# Patient Record
Sex: FEMALE | Race: WHITE | NOT HISPANIC OR LATINO | Employment: FULL TIME | URBAN - METROPOLITAN AREA
[De-identification: names, ages, dates, MRNs, and addresses within clinical notes are randomized per-mention and may not be internally consistent; named-entity substitution may affect disease eponyms.]

---

## 2017-06-19 ENCOUNTER — OFFICE VISIT (OUTPATIENT)
Dept: PSYCHOLOGY | Facility: CLINIC | Age: 57
End: 2017-06-19
Attending: PSYCHOLOGIST
Payer: COMMERCIAL

## 2017-06-19 DIAGNOSIS — F43.20 ADJUSTMENT DISORDER, UNSPECIFIED TYPE: Primary | ICD-10-CM

## 2017-06-19 PROCEDURE — 40000114 ZZH STATISTIC NO CHARGE CLINIC VISIT

## 2017-06-19 PROCEDURE — 90791 PSYCH DIAGNOSTIC EVALUATION: CPT | Mod: ZP | Performed by: PSYCHOLOGIST

## 2017-06-19 NOTE — MR AVS SNAPSHOT
After Visit Summary   6/19/2017    Barb Vazquez    MRN: [id number]           Patient Information     Date Of Birth          1960        Visit Information        Provider Department      6/19/2017 3:00 PM Shahla Stewart PhD LP [facility]         Today's Diagnoses     Adjustment disorder, unspecified type    -  1       Follow-ups after your visit        Your next 10 appointments already scheduled     Jul 10, 2017  8:00 AM CDT   Return Visit with Shahla Stewart PhD LP   [facility]  ([facility])    [facility]  3rd Flr, [address]  [address]  [facility] [address]   [phone]            Jul 17, 2017  8:00 AM CDT   Return Visit with Shahla Stewart PhD LP   [facility]  ([facility])    [facility]  [address], [address]  [address]  [facility] [address]   [phone]            Jul 24, 2017  1:00 PM CDT   Return Visit with Shahla Stewart PhD LP   [facility]  ([facility])    [facility]  3rd Flr, [address]  [address]  [facility] [address]   [phone]            Aug 01, 2017  9:05 AM CDT   (Arrive by 8:50 AM)   PHYSICAL with DARIO Carroll [facility] ([facility])    [address]  4th Floor  [facility] [address] [phone]            Aug 16, 2017 11:15 AM CDT   MA SCREENING DIGITAL BILATERAL with URBCMA1   [facility] ([facility])    [address] [place] [address], [address]  [facility] [address] [phone]           Do not use any powder, lotion or deodorant under your arms or on your breast. If you do, we will ask you to remove it before your exam.  Wear comfortable, two-piece clothing.  If you have any allergies, tell your care team.  Bring any previous mammograms from other facilities or have them mailed to the breast  center.              Who to contact     Please call your clinic at [phone] to:    Ask questions about your health    Make or cancel appointments    Discuss your medicines    Learn about your test results    Speak to your doctor   If you have compliments or concerns about an experience at your clinic, or if you wish to file a complaint, please contact [facility] Patient Relations at [phone] or email us at [email or web address]         Additional Information About Your Visit        MyChart Information     Dejamort gives you secure access to your electronic health record. If you see a primary care provider, you can also send messages to your care team and make appointments. If you have questions, please call your primary care clinic.  If you do not have a primary care provider, please call [phone] and they will assist you.      MedAlliance is an electronic gateway that provides easy, online access to your medical records. With MedAlliance, you can request a clinic appointment, read your test results, renew a prescription or communicate with your care team.     To access your existing account, please contact your [facility] or call [phone] for assistance.        Care EveryWhere ID     This is your Care EveryWhere ID. This could be used by other organizations to access your [place] medical records  [id number]         Blood Pressure from Last 3 Encounters:   08/04/16 110/71   07/28/15 102/65   08/07/14 93/61    Weight from Last 3 Encounters:   08/04/16 68.7 kg (151 lb 6.4 oz)   07/28/15 71.5 kg (157 lb 9.6 oz)   08/07/14 70.6 kg (155 lb 9.6 oz)              Today, you had the following     No orders found for display       Primary Care Provider Office Phone # Fax #    DARIO Carroll [phone]       PRIMARY CARE CENTER [address] [place] [address] [phone]  [facility] [address]        Thank you!     Thank you for choosing  [facility]   for your care. Our goal is always to provide you with excellent care. Hearing back from our patients is one way we can continue to improve our services. Please take a few minutes to complete the written survey that you may receive in the mail after your visit with us. Thank you!             Your Updated Medication List - Protect others around you: Learn how to safely use, store and throw away your medicines at www.disposemymeds.org.          This list is accurate as of: 6/19/17 11:59 PM.  Always use your most recent med list.                   Brand Name Dispense Instructions for use    SUMAtriptan 100 MG tablet    IMITREX    18 tablet    Take  1/2 - 1 tab by mouth at the onset of headache. May repeat in 2 hours if needed.  Max 2/day       vitamin D 87175 UNIT capsule    ERGOCALCIFEROL    16 capsule    Take one tablet twice a week for 8 weeks.  Then have lab re-checked.

## 2017-06-19 NOTE — PROGRESS NOTES
"INITIAL PSYCHOLOGY INTERVIEW AND TREATMENT PLAN (seen for 50 min)  Referred by: Self  Identifying information. Ms Snider is a 57yo woman and mother of three adult children.  She was recently  after 35 years of marriage (March 2017).  She is employed full time as a school nurse (\"love my job\").  Her ex- is employed in IT in a large health organization.  The only prior counseling she has had was part of family counseling at the time her youngest daughter was hospitalized.  Information is estimated to be valid and reliable but incomplete.  Presenting problem.  \"\"Our marriage of 35 yrs. . (divorce) just finalized. . the end of March\"  History of presenting problem.   KS#1.  Major life change/transition.  Ms Vazquez described her  had threatened divorce two times before the third in 2016.  She described he told her the marriage \"couldn't be sustained\" about a year ago.  She describes herself as \"very angry. . never want to see him again. . the 3rd time\".  He evidently had made a similar request about 5 years ago ([id number]).  At that time her response was that their daughter was in [place] . . not a good  time. . and we tried on our own. . And the kids were going off to college\".  Subsequently in 2015, he told her \"this is not working. . let's get . . (then) oh well I think we can work it out\".  She wanted to maintain a family (an intact marriage) for the kids and she \"was afraid financially\".  Finally,  In August 2016, he again brought up divorce and she agreed.  She describes the issues between them as \"he's very self absorbed. . A poor communicator. . He was isolating himself. . (said) my issues are because of you, you don't make me happy, Debby's always bad\".  She describes herself as being \"generally happy\" and that she \"tried to make him happy\".  She thought they both had a satisfactory/satisfying life:  \"good jobs, three healthy kids, enough money, we were healthy\".  The couple " "participated in mediation, eventually hired  (\"he hired a \"), she did not initially tell anyone (\"told the kids\"/Sept 2016, and finally she moved out.  He has remained in their house, which is what she would have preferred for herself.  She considered seeking additional spousal support but signed the divorce papers to avoid another expensive process.  She says her family also is angry at him and her youngest daughter thinks \"eventually you will be better off\".   Evidently initially a reluctant divorce from her perspective and divorce process/results unpleasant.  She reports the couple did not participate in couples' counseling because they had had a prior experience with family counseling in which she felt non-supported by the therapist.  IMPRESSION. Residual anger directed at ex-.   ID#2.  Additional stresses.  Hx incomplete.  Additional family and personal history.  Two of her children live out-of-state.  One daughter lives in [place].  Health.  \"excellent\".  Does not smoke tobacco, minimal alcohol use and exercises \"a lot\".  Mental status.  Ms Vazquez is a neatly groomed pleasant woman who is aware she is very angry at her ex-.  She was occasionally tearful.  Her focus was on her ex-, his behavior, and her residual anger towards him.  Orientation, thought processes, memory, attention, concentration, and eye contact were satisfactory.  The overall impression is of a recently  woman who had been \"generally happy\" while  and continues to be angry at her  for his quietly persistent and eventually successful choice for divorce.  Diagnosis    Axis I.  Adjustment rx  Axis II.  Deferred  Axis III.   Axis IV. Psychosocial stress:  HIGH  Axis V.  GAF past yr:  85   GAF current:  85  PLAN.  RT 2 wks - established 3 initial appts.  "

## 2017-07-10 ENCOUNTER — OFFICE VISIT (OUTPATIENT)
Dept: PSYCHOLOGY | Facility: CLINIC | Age: 57
End: 2017-07-10
Attending: OBSTETRICS & GYNECOLOGY
Payer: COMMERCIAL

## 2017-07-10 DIAGNOSIS — F43.20 ADJUSTMENT DISORDER, UNSPECIFIED TYPE: Primary | ICD-10-CM

## 2017-07-10 PROCEDURE — 90834 PSYTX W PT 45 MINUTES: CPT | Mod: ZP | Performed by: PSYCHOLOGIST

## 2017-07-10 NOTE — MR AVS SNAPSHOT
After Visit Summary   7/10/2017    Barb Vazquez    MRN: [id number]           Patient Information     Date Of Birth          1960        Visit Information        Provider Department      7/10/2017 8:00 AM Shahla Stewart, PhD LOIS [facility]         Today's Diagnoses     Adjustment disorder, unspecified type    -  1       Follow-ups after your visit        Your next 10 appointments already scheduled     Jul 17, 2017  8:00 AM CDT   Return Visit with Shahla Stewart PhD LP   [facility]  ([facility])    [facility]  [address]rd [address], [address]  [address] [facility] [address] [phone]            Jul 24, 2017  1:00 PM CDT   Return Visit with Shahla Stewart PhD LP   [facility]  ([facility])    [facility]  [address]rd [address], [address]  [address] [place] [id number] [phone]            Aug 01, 2017  9:05 AM CDT   (Arrive by 8:50 AM)   PHYSICAL with DARIO Carroll [facility] ([facility])    [address]  4th Floor  [facility] [address] [phone]            Aug 16, 2017 11:15 AM CDT   MA SCREENING DIGITAL BILATERAL with URBCMA1   [facility] ([facility])    [address] [place] [address], [address]  [facility] [address] [phone]           Do not use any powder, lotion or deodorant under your arms or on your breast. If you do, we will ask you to remove it before your exam.  Wear comfortable, two-piece clothing.  If you have any allergies, tell your care team.  Bring any previous mammograms from other facilities or have them mailed to the breast center.              Who to contact     Please call your clinic at [phone] to:    Ask questions about your health    Make or cancel appointments    Discuss your medicines    Learn about your test results    Speak to your doctor   If you have  compliments or concerns about an experience at your clinic, or if you wish to file a complaint, please contact [facility] Patient Relations at [phone] or email us at [email or web address]         Additional Information About Your Visit        MyChart Information     Acrolinxhart gives you secure access to your electronic health record. If you see a primary care provider, you can also send messages to your care team and make appointments. If you have questions, please call your primary care clinic.  If you do not have a primary care provider, please call [phone] and they will assist you.      [email or web address] is an electronic gateway that provides easy, online access to your medical records. With [email or web address], you can request a clinic appointment, read your test results, renew a prescription or communicate with your care team.     To access your existing account, please contact your [facility] or call [phone] for assistance.        Care EveryWhere ID     This is your Care EveryWhere ID. This could be used by other organizations to access your [place] medical records  [id number]         Blood Pressure from Last 3 Encounters:   08/04/16 110/71   07/28/15 102/65   08/07/14 93/61    Weight from Last 3 Encounters:   08/04/16 68.7 kg (151 lb 6.4 oz)   07/28/15 71.5 kg (157 lb 9.6 oz)   08/07/14 70.6 kg (155 lb 9.6 oz)              Today, you had the following     No orders found for display       Primary Care Provider Office Phone # Fax #    Daly JOSE Euceda, DARIO [phone]       PRIMARY CARE CENTER [address] [place] [address] 741  [facility] [address]        Equal Access to Services     FABI SINGLETON : Hadii danelle Chavez, wacaitieda luqadaha, qajannetteta kaalmada adesjyanatali, heidi sadler. So [facility] [phone].    ATENCIÓN: Si habla español, tiene a davis disposición servicios gratuitos de asistencia lingüística. Llame  al [phone].    We comply with applicable federal civil rights laws and [place] laws. We do not discriminate on the basis of race, color, national origin, age, disability sex, sexual orientation or gender identity.            Thank you!     Thank you for choosing [facility]   for your care. Our goal is always to provide you with excellent care. Hearing back from our patients is one way we can continue to improve our services. Please take a few minutes to complete the written survey that you may receive in the mail after your visit with us. Thank you!             Your Updated Medication List - Protect others around you: Learn how to safely use, store and throw away your medicines at www.disposemymeds.org.          This list is accurate as of: 7/10/17 12:51 PM.  Always use your most recent med list.                   Brand Name Dispense Instructions for use Diagnosis    SUMAtriptan 100 MG tablet    IMITREX    18 tablet    Take  1/2 - 1 tab by mouth at the onset of headache. May repeat in 2 hours if needed.  Max 2/day    Frequent headaches       vitamin D 27894 UNIT capsule    ERGOCALCIFEROL    16 capsule    Take one tablet twice a week for 8 weeks.  Then have lab re-checked.    Unspecified vitamin D deficiency
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
## 2017-07-13 ENCOUNTER — TRANSFERRED RECORDS (OUTPATIENT)
Dept: HEALTH INFORMATION MANAGEMENT | Facility: CLINIC | Age: 57
End: 2017-07-13

## 2017-07-17 ENCOUNTER — OFFICE VISIT (OUTPATIENT)
Dept: PSYCHOLOGY | Facility: CLINIC | Age: 57
End: 2017-07-17
Attending: OBSTETRICS & GYNECOLOGY
Payer: COMMERCIAL

## 2017-07-17 DIAGNOSIS — F43.20 ADJUSTMENT DISORDER, UNSPECIFIED TYPE: Primary | ICD-10-CM

## 2017-07-17 PROCEDURE — 90834 PSYTX W PT 45 MINUTES: CPT | Mod: ZP | Performed by: PSYCHOLOGIST

## 2017-07-17 NOTE — PROGRESS NOTES
"Psychology Progress Note  Counseling (#3).  INDIV TH 50 min.  INTERV pr solving, support.  DX: F43.23  UT Mood variability:  anger  S \"became a grandma on Saturday. . (notified) by email. . Wondered it he would be there\" \"going to [place] for Thanksgiving, he never responded to the email\" \"son coming in August with his gf, will stay with is Al (father)\"  \"he gives them things. . A car, condo. . I can't do that\"  (?issues) \"relationships with the children, living situation (he in beautiful house, I'm in 2-bedroom duplex), things at the house I would like, criticism\" (?) \"having kids changed everything- - he changed, complaining, criticism, blaming. . Worse and worse until 12 yrs ago I decided it wasn't going to change, friends told me to lower my expectations. . (I decided) to do what makes me happy. . Didn't know if I'd get (financial) support\" \"walking on egg shells (around children)\" \"He expected things would be like before. . \"  O Perceiving relationships with children are changing.  A Aware divorce has changed family relationships, including between her and her 3 children and probably between the siblings.  Discussed that relationships between she and her adult children might change anyway but add-in the divorce and it is relatively easy to assume it is the 'divorce' that is changing the dynamics.  The children are behaving in ways that she did: ie, not confronting father's behavior, not wanting to rock the boat, accepting his gifts. Post-divorce she is at a relative financial and housing disadvantage, ie has given up specific advantages that ex- continues to enjoy.  She also is aware that while  she 'gave up' qualities of who she was - e.g., cooking.  Speculation:  Ex- was self-absorbed?  Discussed how direct can she be with her children.  GOAL   Possibly design who she wants to be, ie, are there qualities she has given up that she would like to re-develop? Or Develop other " qualities?  Plan.  RT 1-2 wks.  TP  6.19.2017

## 2017-07-17 NOTE — MR AVS SNAPSHOT
After Visit Summary   7/17/2017    Barb Vazquez    MRN: [id number]           Patient Information     Date Of Birth          1960        Visit Information        Provider Department      7/17/2017 8:00 AM Shahla Stewart, PhD LOIS [facility]         Today's Diagnoses     Adjustment disorder, unspecified type    -  1       Follow-ups after your visit        Your next 10 appointments already scheduled     Jul 24, 2017  1:00 PM CDT   Return Visit with Shahla Stewart, PhD LOIS   [facility]  ([facility])    [facility]  3rd Flr, [facility] [address]  [address] [facility] [address] [phone]            Aug 01, 2017  9:05 AM CDT   (Arrive by 8:50 AM)   PHYSICAL with DARIO Carroll [facility] ([facility])    [address]  4th Floor  [facility] [address] [phone]            Aug 16, 2017 11:15 AM CDT   MA SCREENING DIGITAL BILATERAL with URBCMA1   [facility] ([facility])    [address] [place] [address], [address]  [facility] [address] [phone]           Do not use any powder, lotion or deodorant under your arms or on your breast. If you do, we will ask you to remove it before your exam.  Wear comfortable, two-piece clothing.  If you have any allergies, tell your care team.  Bring any previous mammograms from other facilities or have them mailed to the breast center.              Who to contact     Please call your clinic at [phone] to:    Ask questions about your health    Make or cancel appointments    Discuss your medicines    Learn about your test results    Speak to your doctor   If you have compliments or concerns about an experience at your clinic, or if you wish to file a complaint, please contact [facility] Patient Relations at [phone] or email us at [email or web address]          Additional Information About Your Visit        Mycell Technologieshart Information     Aphios gives you secure access to your electronic health record. If you see a primary care provider, you can also send messages to your care team and make appointments. If you have questions, please call your primary care clinic.  If you do not have a primary care provider, please call [phone] and they will assist you.      Aphios is an electronic gateway that provides easy, online access to your medical records. With Aphios, you can request a clinic appointment, read your test results, renew a prescription or communicate with your care team.     To access your existing account, please contact your [facility] or call [phone] for assistance.        Care EveryWhere ID     This is your Care EveryWhere ID. This could be used by other organizations to access your [place] medical records  [id number]         Blood Pressure from Last 3 Encounters:   08/04/16 110/71   07/28/15 102/65   08/07/14 93/61    Weight from Last 3 Encounters:   08/04/16 68.7 kg (151 lb 6.4 oz)   07/28/15 71.5 kg (157 lb 9.6 oz)   08/07/14 70.6 kg (155 lb 9.6 oz)              Today, you had the following     No orders found for display       Primary Care Provider Office Phone # Fax #    Daly GARCIA DARIO Euceda [phone]       PRIMARY CARE CENTER [address] [place] [address] 741  [facility] [address]        Equal Access to Services     FABI SINGLETON : Hadii aad ku hadasho Soomaali, waaxda luqadaha, qaybta kaalmada adeegyada, heidi campoverde . So [facility] [phone].    ATENCIÓN: Si habla español, tiene a davis disposición servicios gratuitos de asistencia lingüística. Llame al [phone].    We comply with applicable federal civil rights laws and [place] laws. We do not discriminate on the basis of race, color, national origin, age, disability sex, sexual orientation or gender identity.            Thank  you!     Thank you for choosing [facility]   for your care. Our goal is always to provide you with excellent care. Hearing back from our patients is one way we can continue to improve our services. Please take a few minutes to complete the written survey that you may receive in the mail after your visit with us. Thank you!             Your Updated Medication List - Protect others around you: Learn how to safely use, store and throw away your medicines at www.disposemymeds.org.          This list is accurate as of: 7/17/17  1:34 PM.  Always use your most recent med list.                   Brand Name Dispense Instructions for use Diagnosis    SUMAtriptan 100 MG tablet    IMITREX    18 tablet    Take  1/2 - 1 tab by mouth at the onset of headache. May repeat in 2 hours if needed.  Max 2/day    Frequent headaches       vitamin D 11642 UNIT capsule    ERGOCALCIFEROL    16 capsule    Take one tablet twice a week for 8 weeks.  Then have lab re-checked.    Unspecified vitamin D deficiency

## 2017-07-18 ASSESSMENT — ACTIVITIES OF DAILY LIVING (ADL)
DO_MEMBERS_OF_YOUR_HOUSEHOLD_USE_SAFETY_HELMETS?: Y
ARE_THERE_CARBON_MONOXIDE_DETECTORS_IN_YOUR_HOME?: Y
ARE_THERE_SMOKE_DETECTORS_IN_YOUR_HOME?: Y
DO_MEMBERS_OF_YOUR_HOUSEHOLD_USE_SUNSCREEN?: Y
DO_MEMBERS_OF_YOUR_HOUSEHOLD_WEAR_SEAT_BELTS?: Y
ARE_THERE_FIREARMS_IN_YOUR_HOME?: N

## 2017-07-24 ENCOUNTER — OFFICE VISIT (OUTPATIENT)
Dept: PSYCHOLOGY | Facility: CLINIC | Age: 57
End: 2017-07-24
Attending: OBSTETRICS & GYNECOLOGY
Payer: COMMERCIAL

## 2017-07-24 DIAGNOSIS — F43.20 ADJUSTMENT DISORDER, UNSPECIFIED TYPE: Primary | ICD-10-CM

## 2017-07-24 PROCEDURE — 90834 PSYTX W PT 45 MINUTES: CPT | Mod: ZP | Performed by: PSYCHOLOGIST

## 2017-07-24 NOTE — PROGRESS NOTES
"Psychology Progress Note  Counseling (#4).  INDIV TH 50 min.  INTERV pr solving, support.  DX: F43.23  MS Stress  S \"I'm a fatalist, then go into a tailspin. . Found out thru e-mail about the baby. . Didn't hear from her (daughter about seeing baby). . Ready to call and ask what I had done (wrong). . Then she called. . Everything was fine\" \"will see parents in [place]\" \"will continue to look (for house). . Paying $1450/mon. . My sister came up from [place] . . Loved where I live\" \"did my will, transferred checking\"  O Identifies making progress on practical issues.  Housing remains uncertain for now.  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  Many uncertainties remain.  Discussed 'awfulizing\" and role in downward spiraling.  GOAL Challenge awfulizing tendency.  Plan.  Encouraged to make additional follow up appts at her chosen level of frequency.  May introduce some CBT concepts to challenge awfulizing.  Schedule 2 appts in August.  TP  6.19.2017    "

## 2017-07-24 NOTE — MR AVS SNAPSHOT
After Visit Summary   7/24/2017    Barb Vazquez    MRN: [id number]           Patient Information     Date Of Birth          1960        Visit Information        Provider Department      7/24/2017 1:00 PM Shahla Stewart, PhD LP [facility]         Today's Diagnoses     Adjustment disorder, unspecified type    -  1       Follow-ups after your visit        Your next 10 appointments already scheduled     Aug 01, 2017  9:05 AM CDT   (Arrive by 8:50 AM)   PHYSICAL with DARIO Carroll [facility] ([facility])    [address]  4th Floor  [facility] [address] [phone]            Aug 16, 2017 11:15 AM CDT   MA SCREENING DIGITAL BILATERAL with URBCMA1   [facility] ([facility])    [address] [place] [address], [address]  [facility] [address] [phone]           Do not use any powder, lotion or deodorant under your arms or on your breast. If you do, we will ask you to remove it before your exam.  Wear comfortable, two-piece clothing.  If you have any allergies, tell your care team.  Bring any previous mammograms from other facilities or have them mailed to the breast center.              Who to contact     Please call your clinic at [phone] to:    Ask questions about your health    Make or cancel appointments    Discuss your medicines    Learn about your test results    Speak to your doctor   If you have compliments or concerns about an experience at your clinic, or if you wish to file a complaint, please contact [facility] Patient Relations at [phone] or email us at [email or web address]         Additional Information About Your Visit        MyChart Information     [employer] gives you secure access to your electronic health record. If you see a primary care provider, you can also send messages to your care team and make appointments. If you  have questions, please call your primary care clinic.  If you do not have a primary care provider, please call [phone] and they will assist you.      [employer] is an electronic gateway that provides easy, online access to your medical records. With [employer], you can request a clinic appointment, read your test results, renew a prescription or communicate with your care team.     To access your existing account, please contact your [facility] or call [phone] for assistance.        Care EveryWhere ID     This is your Care EveryWhere ID. This could be used by other organizations to access your [place] medical records  [id number]         Blood Pressure from Last 3 Encounters:   08/04/16 110/71   07/28/15 102/65   08/07/14 93/61    Weight from Last 3 Encounters:   08/04/16 68.7 kg (151 lb 6.4 oz)   07/28/15 71.5 kg (157 lb 9.6 oz)   08/07/14 70.6 kg (155 lb 9.6 oz)              Today, you had the following     No orders found for display       Primary Care Provider Office Phone # Fax #    DARIO Carroll [phone]       PRIMARY CARE CENTER [address] [facility] [address]  [facility] [address]        Equal Access to Services     FABI SINGLETON : Hadii aad ku hadasho Soomaali, waaxda luqadaha, qaybta kaalmada adeegyada, waxay banin karl sadler. So [facility] [phone].    ATENCIÓN: Si habla español, tiene a davis disposición servicios gratuitos de asistencia lingüística. Llame al [phone].    We comply with applicable federal civil rights laws and [place] laws. We do not discriminate on the basis of race, color, national origin, age, disability sex, sexual orientation or gender identity.            Thank you!     Thank you for choosing [facility]   for your care. Our goal is always to provide you with excellent care. Hearing back from our patients is one way we can continue to improve our services. Please take a few minutes  to complete the written survey that you may receive in the mail after your visit with us. Thank you!             Your Updated Medication List - Protect others around you: Learn how to safely use, store and throw away your medicines at www.disposemymeds.org.          This list is accurate as of: 7/24/17 11:59 PM.  Always use your most recent med list.                   Brand Name Dispense Instructions for use Diagnosis    SUMAtriptan 100 MG tablet    IMITREX    18 tablet    Take  1/2 - 1 tab by mouth at the onset of headache. May repeat in 2 hours if needed.  Max 2/day    Frequent headaches       vitamin D 18694 UNIT capsule    ERGOCALCIFEROL    16 capsule    Take one tablet twice a week for 8 weeks.  Then have lab re-checked.    Unspecified vitamin D deficiency

## 2017-08-01 ENCOUNTER — OFFICE VISIT (OUTPATIENT)
Dept: INTERNAL MEDICINE | Facility: CLINIC | Age: 57
End: 2017-08-01

## 2017-08-01 VITALS
HEIGHT: 64 IN | RESPIRATION RATE: 16 BRPM | DIASTOLIC BLOOD PRESSURE: 63 MMHG | BODY MASS INDEX: 26.46 KG/M2 | WEIGHT: 155 LBS | HEART RATE: 54 BPM | SYSTOLIC BLOOD PRESSURE: 102 MMHG

## 2017-08-01 DIAGNOSIS — Z12.4 SCREENING FOR CERVICAL CANCER: ICD-10-CM

## 2017-08-01 DIAGNOSIS — Z00.00 ROUTINE GENERAL MEDICAL EXAMINATION AT A HEALTH CARE FACILITY: ICD-10-CM

## 2017-08-01 DIAGNOSIS — J98.01 ACUTE BRONCHOSPASM: Primary | ICD-10-CM

## 2017-08-01 DIAGNOSIS — J98.01 ACUTE BRONCHOSPASM: ICD-10-CM

## 2017-08-01 PROBLEM — D12.6 COLON ADENOMA: Status: ACTIVE | Noted: 2017-07-13

## 2017-08-01 LAB
ANION GAP SERPL CALCULATED.3IONS-SCNC: 9 MMOL/L (ref 3–14)
BUN SERPL-MCNC: 18 MG/DL (ref 7–30)
CALCIUM SERPL-MCNC: 8.8 MG/DL (ref 8.5–10.1)
CHLORIDE SERPL-SCNC: 105 MMOL/L (ref 94–109)
CO2 SERPL-SCNC: 26 MMOL/L (ref 20–32)
CREAT SERPL-MCNC: 1.08 MG/DL (ref 0.52–1.04)
GFR SERPL CREATININE-BSD FRML MDRD: 52 ML/MIN/1.7M2
GLUCOSE SERPL-MCNC: 87 MG/DL (ref 70–99)
POTASSIUM SERPL-SCNC: 3.6 MMOL/L (ref 3.4–5.3)
SODIUM SERPL-SCNC: 140 MMOL/L (ref 133–144)
TSH SERPL DL<=0.05 MIU/L-ACNC: 1.43 MU/L (ref 0.4–4)

## 2017-08-01 RX ORDER — ALBUTEROL SULFATE 90 UG/1
2 AEROSOL, METERED RESPIRATORY (INHALATION) EVERY 6 HOURS
Qty: 1 INHALER | Refills: 1 | Status: SHIPPED | OUTPATIENT
Start: 2017-08-01 | End: 2021-04-08

## 2017-08-01 ASSESSMENT — PAIN SCALES - GENERAL: PAINLEVEL: NO PAIN (0)

## 2017-08-01 NOTE — NURSING NOTE
Chief Complaint   Patient presents with     Physical     Pt is here for a physical.      Lizeth Mccann LPN August 1, 2017 8:50 AM

## 2017-08-01 NOTE — PROGRESS NOTES
Barb Vazquez is a 56 year old female who presents to clinic today for her annual physical.  She feels well .  No new problems. She is planning to participate in a tri-athelon in a few weeks.  She does drink milk and eats yogurt daily. She remembers to take vitamin D supplement at least 3 times a week.   She has a mammo scheduled later this August.    She is newly .  Her  has been mentioning he wanted a divorce for a few years so  Last summer she agreed.  She moved out of her house in January and is living in a duplex and her ex- was suppose to sell the house but has stayed in it.        She has a new and first grandchild, a boy born 2 weeks ago. They live in town.   Her oldest daughter finished her first year of medical school at [place].  Her son lives in [place]. Kids are doing well.     She has the following medical issues :   Patient Active Problem List   Diagnosis     CARDIOVASCULAR SCREENING; LDL GOAL LESS THAN 160     Blepharospasm     Keloid of skin     Osteopenia     Vitamin D deficiency       MEDICATIONS:  Current Outpatient Prescriptions   Medication     SUMAtriptan (IMITREX) 100 MG tablet     vitamin D (ERGOCALCIFEROL) 07578 UNIT capsule       ALLERGIES: Asa [aspirin]    PAST MEDICAL HX: No past medical history on file.    PAST SURGICAL HX: No past surgical history on file.    IMMUNIZATION HX:   Immunization History   Administered Date(s) Administered     Hepatitis A Vac Ped/Adol-2 Dose 04/03/2009, 08/16/2011     TD (ADULT, 7+) 08/03/1994     TDAP Vaccine (Boostrix) 08/16/2011     Yellow Fever 04/03/2009     Family History   Problem Relation Age of Onset     Glaucoma Father      Lipids Other      Neurologic Disorder Mother      pituitary tumor     CANCER Mother      SOCIAL HX:   History     Social History     Marital Status:      Spouse Name: N/A     Number of Children: 3     Years of Education: N/A     Occupational History     Rn      Social History Main Topics      "Smoking status: Never Smoker      Smokeless tobacco: Never Used     Alcohol Use: None     Drug Use: None     Sexually Active: yes     Other Topics Concern     None     Social History Narrative     None     ROS:   CONSTITUTIONAL:no unexpected change in weight  SKIN:no new findings     EYES: no acute vision problems or changes.  Wears contact lenses. Exams UTD.    ENT: No mouth problems, no throat problems.  Dental UTD.    RESP: no significant cough, no shortness of breath.  She does have bronchospasms with URIs.  Requesting an RX for Albuterol.  CV: no chest pain, no palpitations, no new or worsening peripheral edema  GI: no nausea, no vomiting, no constipation, no diarrhea. She had a colonoscopy 2017 and had an advanced adenoma 9x11 mm removed. Was advised to repeat colonoscopy in 3 years.   : Menopausal since .  .  Last pap 2013.   BREASTS: last Mammo was 8/15/2016.  END: nl.  She was diagnosed with osteopenia by Dexa in  and was treated for low vitamin D level and continues to take daily vitamin D supplement.Dexa 2016:  The most negative and valid Z-score of -1.1 at the level of the right femoral neck, is WITHIN  expected range for age.  MS: no new complaints.  NEURO:The  Sumatriptin works well for her migraine headaches and she has not had these often.   PSYCH: Nl. She feels she is doing well after the divorce and mostly is angry with her ex- and feels he is totally self-absorbed. She believes she will be fine. She is seeing a counselor.       O:  /63  Pulse 54  Resp 16  Ht 1.632 m (5' 4.25\")  Wt 70.3 kg (155 lb)  BMI 26.4 kg/m2Body mass index is 26.4 kg/(m^2).  GENERAL APPEARANCE: healthy, alert and no distress  EYES: contacts. PERRLA, conjunctiva clear, sclera white.   HENT: ear canals and TM's normal and nose and mouth without ulcers or lesions.  Teeth in good repair  SKIN: She has a keloid R para-sternal  area.   RESP: lungs clear to auscultation - no rales, rhonchi " or wheezes.  Neck: neg for thyroid enlargement.  CV: regular rates and rhythm, normal S1 S2, no S3 or S4 and no murmur, click or rub -  BREASTS: no masses, no dimpling.  ABDOMEN:  soft, non tender, no HSM or masses and bowel sounds normal  Pelvic Exam:  Vulva: No external lesions, normal hair distribution, no adenopathy  Vagina: Moist, pink, no abnormal discharge, well rugated, no lesions  Cervix: parous, mild erosion, pink, no visible lesions  Uterus: Normal size, anteverted, non-tender, mobile  Ovaries: No mass, non-tender, mobile  LYMPH: no neck, supra or infra-clavicular or axillary adenopathy.  Neuro: nl   Ext: warm.   Edema : None  MS: grossly normal      Answers for HPI/ROS submitted by the patient on 7/18/2017   Annual Exam:  General Symptoms: No  Skin Symptoms: No  HENT Symptoms: No  EYE SYMPTOMS: No  HEART SYMPTOMS: No  LUNG SYMPTOMS: No  INTESTINAL SYMPTOMS: No  URINARY SYMPTOMS: No  GYNECOLOGIC SYMPTOMS: No  BREAST SYMPTOMS: No  SKELETAL SYMPTOMS: No  BLOOD SYMPTOMS: No  NERVOUS SYSTEM SYMPTOMS: No  MENTAL HEALTH SYMPTOMS: No  Frequency of exercise:: 4-5 days/week  Duration of exercise:: 45-60 minutes    Barb was seen today for physical.    Diagnoses and all orders for this visit:    Acute bronchospasm  -     albuterol (PROAIR HFA/PROVENTIL HFA/VENTOLIN HFA) 108 (90 BASE) MCG/ACT Inhaler; Inhale 2 puffs into the lungs every 6 hours to use when experiencing bronchospasms.     Routine general medical examination at a health care facility  -     Pelvic and Breast Exam Procedure (exam)        -     TSH        -     Basic metabolic panel    Screening for cervical cancer  -     Pap imaged thin layer screen with HPV - recommended age 30 - 65 years (select HPV order below).    Adjustment Rx          -     She will continue to see Yo Stewart as needed.     Colon Adenoma           -     Colonoscopy 3 years. Will obtain outside records and have her sign CALLY.    RTC 1 year.     Total time spent 40 minutes.  More  than 50% of the time spent with Ms. Vazquez on counseling / coordinating her care

## 2017-08-01 NOTE — MR AVS SNAPSHOT
After Visit Summary   8/1/2017    Barb Vazquez    MRN: [id number]           Patient Information     Date Of Birth          1960        Visit Information        Provider Department      8/1/2017 9:05 AM Daly Euceda, APRN CNP [facility]        Today's Diagnoses     Acute bronchospasm    -  1    Routine general medical examination at a health care facility        Screening for cervical cancer          Care Instructions    Primary Care Center Medication Refill Request Information:  * Please contact your pharmacy regarding ANY request for medication refills.  ** [facility] Prescription Fax = [phone]  * Please allow 3 business days for routine medication refills.  * Please allow 5 business days for controlled substance medication refills.     Primary Care Center Test Result notification information:  *You will be notified within 7-10 days of your appointment day regarding the results of your test.  If you are on MyChart you will be notified as soon as the provider has reviewed the results and signed off on them.            Follow-ups after your visit        Your next 10 appointments already scheduled     Aug 07, 2017  8:00 AM CDT   Return Visit with Shahla Stewart, PhD    [facility]  ([facility])    [address], [facility] [address]  [address] [place] [id number] [phone]            Aug 16, 2017 11:15 AM CDT   MA SCREENING DIGITAL BILATERAL with URBCMA1   [facility] ([facility])    [address] [place] [address], [address]  [facility] [id number] [phone]           Do not use any powder, lotion or deodorant under your arms or on your breast. If you do, we will ask you to remove it before your exam.  Wear comfortable, two-piece clothing.  If you have any allergies, tell your care team.  Bring any previous mammograms from other facilities or have them mailed to the breast center.            Sep  "18, 2017  8:00 AM CDT   Return Visit with Shahla Stewart, PhD LP   [facility]  ([facility])    [facility]  3rd [facility], [facility] [address]  [address] [address] [phone]              Who to contact     Please call your clinic at [phone] to:    Ask questions about your health    Make or cancel appointments    Discuss your medicines    Learn about your test results    Speak to your doctor   If you have compliments or concerns about an experience at your clinic, or if you wish to file a complaint, please contact [facility] Patient Relations at [phone] or email us at [email or web address]         Additional Information About Your Visit        Lockbox[employer]Fantrotter Information     [employer] gives you secure access to your electronic health record. If you see a primary care provider, you can also send messages to your care team and make appointments. If you have questions, please call your primary care clinic.  If you do not have a primary care provider, please call [phone] and they will assist you.      [employer] is an electronic gateway that provides easy, online access to your medical records. With [employer], you can request a clinic appointment, read your test results, renew a prescription or communicate with your care team.     To access your existing account, please contact your [facility] or call [phone] for assistance.        Care EveryWhere ID     This is your Care EveryWhere ID. This could be used by other organizations to access your [place] medical records  [id number]        Your Vitals Were     Pulse Respirations Height BMI (Body Mass Index)          54 16 1.632 m (5' 4.25\") 26.4 kg/m2         Blood Pressure from Last 3 Encounters:   08/01/17 102/63   08/04/16 110/71   07/28/15 102/65    Weight from Last 3 Encounters:   08/01/17 70.3 kg (155 lb)   08/04/16 68.7 kg (151 lb 6.4 oz) "   07/28/15 71.5 kg (157 lb 9.6 oz)              We Performed the Following     Basic metabolic panel     Pap imaged thin layer screen with HPV - recommended age 30 - 65 years (select HPV order below)     Pelvic and Breast Exam Procedure (exam)     TSH          Today's Medication Changes          These changes are accurate as of: 8/1/17 10:11 AM.  If you have any questions, ask your nurse or doctor.               Start taking these medicines.        Dose/Directions    albuterol 108 (90 BASE) MCG/ACT Inhaler   Commonly known as:  PROAIR HFA/PROVENTIL HFA/VENTOLIN HFA   Used for:  Acute bronchospasm   Started by:  Daly Euceda, DARIO CNP        Dose:  2 puff   Inhale 2 puffs into the lungs every 6 hours   Quantity:  1 Inhaler   Refills:  1            Where to get your medicines      These medications were sent to [facility] [address]  [address], [facility] [address]     Phone:  [phone]     albuterol 108 (90 BASE) MCG/ACT Inhaler                Primary Care Provider Office Phone # Fax #    DARIO Carroll [phone]       PRIMARY CARE CENTER [address] [place] [address] [address]  [facility] [address]        Equal Access to Services     FABI SINGLETON AH: Hadii danelle mattson hadasho Soomaali, waaxda luqadaha, qaybta kaalmada adeegyada, heidi sadler. So [facility] [phone].    ATENCIÓN: Si habla español, tiene a davis disposición servicios gratuitos de asistencia lingüística. Llame al [phone].    We comply with applicable federal civil rights laws and [place] laws. We do not discriminate on the basis of race, color, national origin, age, disability sex, sexual orientation or gender identity.            Thank you!     Thank you for choosing [facility]  for your care. Our goal is always to provide you with excellent care. Hearing back from our patients is one way we can continue to improve our services. Please take a  few minutes to complete the written survey that you may receive in the mail after your visit with us. Thank you!             Your Updated Medication List - Protect others around you: Learn how to safely use, store and throw away your medicines at www.disposemymeds.org.          This list is accurate as of: 8/1/17 10:11 AM.  Always use your most recent med list.                   Brand Name Dispense Instructions for use Diagnosis    albuterol 108 (90 BASE) MCG/ACT Inhaler    PROAIR HFA/PROVENTIL HFA/VENTOLIN HFA    1 Inhaler    Inhale 2 puffs into the lungs every 6 hours    Acute bronchospasm       SUMAtriptan 100 MG tablet    IMITREX    18 tablet    Take  1/2 - 1 tab by mouth at the onset of headache. May repeat in 2 hours if needed.  Max 2/day    Frequent headaches       vitamin D 07949 UNIT capsule    ERGOCALCIFEROL    16 capsule    Take one tablet twice a week for 8 weeks.  Then have lab re-checked.    Unspecified vitamin D deficiency

## 2017-08-01 NOTE — PATIENT INSTRUCTIONS
Primary Care Center Medication Refill Request Information:  * Please contact your pharmacy regarding ANY request for medication refills.  ** [facility] Prescription Fax = [phone]  * Please allow 3 business days for routine medication refills.  * Please allow 5 business days for controlled substance medication refills.     Primary Care Center Test Result notification information:  *You will be notified within 7-10 days of your appointment day regarding the results of your test.  If you are on MyChart you will be notified as soon as the provider has reviewed the results and signed off on them.

## 2017-08-02 LAB
COPATH REPORT: NORMAL
PAP: NORMAL

## 2017-08-03 LAB
FINAL DIAGNOSIS: NORMAL
HPV HR 12 DNA CVX QL NAA+PROBE: NEGATIVE
HPV16 DNA SPEC QL NAA+PROBE: NEGATIVE
HPV18 DNA SPEC QL NAA+PROBE: NEGATIVE
SPECIMEN DESCRIPTION: NORMAL

## 2017-08-07 ENCOUNTER — OFFICE VISIT (OUTPATIENT)
Dept: PSYCHOLOGY | Facility: CLINIC | Age: 57
End: 2017-08-07
Attending: PSYCHOLOGIST
Payer: COMMERCIAL

## 2017-08-07 DIAGNOSIS — F43.20 ADJUSTMENT DISORDER, UNSPECIFIED TYPE: Primary | ICD-10-CM

## 2017-08-07 PROCEDURE — 90834 PSYTX W PT 45 MINUTES: CPT | Mod: ZP | Performed by: PSYCHOLOGIST

## 2017-08-07 PROCEDURE — 40000114 ZZH STATISTIC NO CHARGE CLINIC VISIT

## 2017-08-07 NOTE — MR AVS SNAPSHOT
After Visit Summary   8/7/2017    Barb Vazquez    MRN: [id number]           Patient Information     Date Of Birth          1960        Visit Information        Provider Department      8/7/2017 8:00 AM Shahla Stewart, PhD LOIS [facility]         Today's Diagnoses     Adjustment disorder, unspecified type    -  1       Follow-ups after your visit        Your next 10 appointments already scheduled     Aug 14, 2017 12:00 PM CDT   Return Visit with Shahla Stewart, PhD LOIS   [facility]  ([facility])    [facility]  3rd [address], [place] [address]  [address]  [facility] [address] [phone]            Aug 16, 2017 11:15 AM CDT   MA SCREENING DIGITAL BILATERAL with URBCMA1   [facility] ([facility])    [address] [facility], [address]  [facility] [address] [phone]           Do not use any powder, lotion or deodorant under your arms or on your breast. If you do, we will ask you to remove it before your exam.  Wear comfortable, two-piece clothing.  If you have any allergies, tell your care team.  Bring any previous mammograms from other facilities or have them mailed to the breast center.              Who to contact     Please call your clinic at [phone] to:    Ask questions about your health    Make or cancel appointments    Discuss your medicines    Learn about your test results    Speak to your doctor   If you have compliments or concerns about an experience at your clinic, or if you wish to file a complaint, please contact [facility] Patient Relations at [phone] or email us at [email or web address]         Additional Information About Your Visit        MyChart Information     [employer] gives you secure access to your electronic health record. If you see a primary care provider, you can also send messages to your care team and make appointments. If you have  questions, please call your primary care clinic.  If you do not have a primary care provider, please call [phone] and they will assist you.      [employer] is an electronic gateway that provides easy, online access to your medical records. With [employer], you can request a clinic appointment, read your test results, renew a prescription or communicate with your care team.     To access your existing account, please contact your [facility] or call [phone] for assistance.        Care EveryWhere ID     This is your Care EveryWhere ID. This could be used by other organizations to access your [place] medical records  [id number]         Blood Pressure from Last 3 Encounters:   08/01/17 102/63   08/04/16 110/71   07/28/15 102/65    Weight from Last 3 Encounters:   08/01/17 70.3 kg (155 lb)   08/04/16 68.7 kg (151 lb 6.4 oz)   07/28/15 71.5 kg (157 lb 9.6 oz)              Today, you had the following     No orders found for display       Primary Care Provider Office Phone # Fax #    Daly GARCIA Shivmaraliliana, APRN [phone]       PRIMARY CARE CENTER [address] [facility] [address]  [facility] [address]        Equal Access to Services     FABI SINGLETON : Hadii aad ku hadasho Soomaali, waaxda luqadaha, qaybta kaalmada adeegyada, heidi sadler. So [facility] [phone].    ATENCIÓN: Si habla español, tiene a davis disposición servicios gratuitos de asistencia lingüística. Llame al [phone].    We comply with applicable federal civil rights laws and [place] laws. We do not discriminate on the basis of race, color, national origin, age, disability sex, sexual orientation or gender identity.            Thank you!     Thank you for choosing [facility]   for your care. Our goal is always to provide you with excellent care. Hearing back from our patients is one way we can continue to improve our services. Please take a few minutes to complete  the written survey that you may receive in the mail after your visit with us. Thank you!             Your Updated Medication List - Protect others around you: Learn how to safely use, store and throw away your medicines at www.disposemymeds.org.          This list is accurate as of: 8/7/17 11:27 AM.  Always use your most recent med list.                   Brand Name Dispense Instructions for use Diagnosis    albuterol 108 (90 BASE) MCG/ACT Inhaler    PROAIR HFA/PROVENTIL HFA/VENTOLIN HFA    1 Inhaler    Inhale 2 puffs into the lungs every 6 hours    Acute bronchospasm       SUMAtriptan 100 MG tablet    IMITREX    18 tablet    Take  1/2 - 1 tab by mouth at the onset of headache. May repeat in 2 hours if needed.  Max 2/day    Frequent headaches       vitamin D 96688 UNIT capsule    ERGOCALCIFEROL    16 capsule    Take one tablet twice a week for 8 weeks.  Then have lab re-checked.    Unspecified vitamin D deficiency

## 2017-08-07 NOTE — PROGRESS NOTES
"Psychology Progress Note  Counseling (#5).  INDIV TH 50 min.  INTERV pr solving, support.  DX: F43.23  NC STRESS  S \"see once/yr. . 81 and 83 (visited parents in [place]), my dad takes care of my mom\" \"my daughter(Aydee, lives in [place]/MD-PhD program in [place]; 8 yr program)  is coming with her bf. . Told me she would 'split time between the two' (ie, mother, father/now ). . Think she might go on a retreat for [employer], last yr came, my son (works in [place]) didn't\"  \"bf for 2 yrs, lives in [place]\" (+ about divorce?) \"can go out to dinner more, down to two places with him ()\" \"spending time with the baby, she's fine\"  O More relaxed about daughter/new baby.  Taking more time before reacting to family members' remarks.  A Encouraged to think creatively re holidays with family.  Finding that if she slows up her rx (ie does not AWFULIZE), her worst fears do not happen.  GOAL Look ahead to design life in ways that are creative, pleasing.  Take time, be patient, do not awfulize.  Plan.  Rt 1 wk.  Look ahead to appt when working.  TP  6.19.2017    "

## 2017-08-14 ENCOUNTER — OFFICE VISIT (OUTPATIENT)
Dept: PSYCHOLOGY | Facility: CLINIC | Age: 57
End: 2017-08-14
Attending: PSYCHOLOGIST
Payer: COMMERCIAL

## 2017-08-14 DIAGNOSIS — F43.20 ADJUSTMENT DISORDER, UNSPECIFIED TYPE: Primary | ICD-10-CM

## 2017-08-14 PROCEDURE — 90834 PSYTX W PT 45 MINUTES: CPT | Mod: ZP | Performed by: PSYCHOLOGIST

## 2017-08-14 NOTE — PROGRESS NOTES
"Psychology Progress Note  Counseling (# 6 ).  INDIV TH 50 min.  INTERV pr solving, support.  DX: F43.23   OH Mood variability  S \"good week. . 'meet and greet' baby in my old neighborhood. . start school on THUR. . Like my job. . tri [place] on Sun. . Improve my swim time, nutrition. . Knitting (teacher + 4 others). .   O Reflective.  Less angry except when in direct contact with ex-.  Open to new experiences.  Intuititively pro active. Broad-based social network.  A Feeling less angry.  Transitions going relatively well, e.g., new grandson.  Aware to be careful not to over-react and/or assume something that happens is necessarily about her.  Divorce still relatively recent and changes will continue.  GOAL Continue strengthening ways of managing new life style.  Plan.  Last appt at this time.  RT PRN - she is in agreement with this plan.  TP.  6.19.2016    "

## 2017-08-14 NOTE — MR AVS SNAPSHOT
After Visit Summary   8/14/2017    Barb Vazquez    MRN: [id number]           Patient Information     Date Of Birth          1960        Visit Information        Provider Department      8/14/2017 12:00 PM Shahla Stewart, PhD  [facility]         Today's Diagnoses     Adjustment disorder, unspecified type    -  1       Follow-ups after your visit        Your next 10 appointments already scheduled     Aug 16, 2017 11:15 AM CDT   MA SCREENING DIGITAL BILATERAL with [id number]   [facility] ([facility])    [address] [place] [address], [address]  [facility] [address] [phone]           Do not use any powder, lotion or deodorant under your arms or on your breast. If you do, we will ask you to remove it before your exam.  Wear comfortable, two-piece clothing.  If you have any allergies, tell your care team.  Bring any previous mammograms from other facilities or have them mailed to the breast center.              Who to contact     Please call your clinic at [phone] to:    Ask questions about your health    Make or cancel appointments    Discuss your medicines    Learn about your test results    Speak to your doctor   If you have compliments or concerns about an experience at your clinic, or if you wish to file a complaint, please contact [facility] Patient Relations at [phone] or email us at [email or web address]         Additional Information About Your Visit        MyChart Information     Mountvacationhart gives you secure access to your electronic health record. If you see a primary care provider, you can also send messages to your care team and make appointments. If you have questions, please call your primary care clinic.  If you do not have a primary care provider, please call [phone] and they will assist you.      CTQuan is an electronic gateway that provides easy, online access to your medical records.  With Air Buttonolindat, you can request a clinic appointment, read your test results, renew a prescription or communicate with your care team.     To access your existing account, please contact your [facility] or call [phone] for assistance.        Care EveryWhere ID     This is your Care EveryWhere ID. This could be used by other organizations to access your [place] medical records  [id number]         Blood Pressure from Last 3 Encounters:   08/01/17 102/63   08/04/16 110/71   07/28/15 102/65    Weight from Last 3 Encounters:   08/01/17 70.3 kg (155 lb)   08/04/16 68.7 kg (151 lb 6.4 oz)   07/28/15 71.5 kg (157 lb 9.6 oz)              Today, you had the following     No orders found for display       Primary Care Provider Office Phone # Fax #    Daly Euceda, APRN [phone]       [address] [facility] [address]  [facility] [address]        Equal Access to Services     FABI SINGLETON : Hadii aad ku hadasho Soomaali, waaxda luqadaha, qaybta kaalmada adeegyada, waxay idiin hayaan nils campoverde . So [facility] [phone].    ATENCIÓN: Si habla español, tiene a davis disposición servicios gratuitos de asistencia lingüística. [facility] [phone].    We comply with applicable federal civil rights laws and [place] laws. We do not discriminate on the basis of race, color, national origin, age, disability sex, sexual orientation or gender identity.            Thank you!     Thank you for choosing [facility]   for your care. Our goal is always to provide you with excellent care. Hearing back from our patients is one way we can continue to improve our services. Please take a few minutes to complete the written survey that you may receive in the mail after your visit with us. Thank you!             Your Updated Medication List - Protect others around you: Learn how to safely use, store and throw away your medicines at www.disposemymeds.org.          This  list is accurate as of: 8/14/17  5:04 PM.  Always use your most recent med list.                   Brand Name Dispense Instructions for use Diagnosis    albuterol 108 (90 BASE) MCG/ACT Inhaler    PROAIR HFA/PROVENTIL HFA/VENTOLIN HFA    1 Inhaler    Inhale 2 puffs into the lungs every 6 hours    Acute bronchospasm       SUMAtriptan 100 MG tablet    IMITREX    18 tablet    Take  1/2 - 1 tab by mouth at the onset of headache. May repeat in 2 hours if needed.  Max 2/day    Frequent headaches       vitamin D 27138 UNIT capsule    ERGOCALCIFEROL    16 capsule    Take one tablet twice a week for 8 weeks.  Then have lab re-checked.    Unspecified vitamin D deficiency

## 2017-08-16 ENCOUNTER — RADIANT APPOINTMENT (OUTPATIENT)
Dept: MAMMOGRAPHY | Facility: CLINIC | Age: 57
End: 2017-08-16
Attending: FAMILY MEDICINE
Payer: COMMERCIAL

## 2017-08-16 DIAGNOSIS — Z12.31 VISIT FOR SCREENING MAMMOGRAM: ICD-10-CM

## 2017-08-16 PROCEDURE — G0202 SCR MAMMO BI INCL CAD: HCPCS

## 2018-09-04 ENCOUNTER — RADIANT APPOINTMENT (OUTPATIENT)
Dept: MAMMOGRAPHY | Facility: CLINIC | Age: 58
End: 2018-09-04
Payer: COMMERCIAL

## 2018-09-04 DIAGNOSIS — Z12.31 VISIT FOR SCREENING MAMMOGRAM: ICD-10-CM

## 2018-09-04 PROCEDURE — 77067 SCR MAMMO BI INCL CAD: CPT

## 2018-09-10 ASSESSMENT — ENCOUNTER SYMPTOMS
SKIN CHANGES: 1
NAIL CHANGES: 0
POOR WOUND HEALING: 0

## 2018-09-13 ENCOUNTER — OFFICE VISIT (OUTPATIENT)
Dept: INTERNAL MEDICINE | Facility: CLINIC | Age: 58
End: 2018-09-13
Payer: COMMERCIAL

## 2018-09-13 DIAGNOSIS — Z53.9 ERRONEOUS ENCOUNTER--DISREGARD: Primary | ICD-10-CM

## 2018-09-13 NOTE — PROGRESS NOTES
"[facility]   Daly Euceda, APRN CNP  09/13/2018      Chief Complaint:   No chief complaint on file.       History of Present Illness:   Barb Vazquez is a 57 year old female with a history of *** who presents for evaluation of ***.    Other concerns discussed:  ***     Review of Systems:   Pertinent items are noted in HPI, remainder of complete ROS is negative.      Active Medications:     Current Outpatient Prescriptions:      albuterol (PROAIR HFA/PROVENTIL HFA/VENTOLIN HFA) 108 (90 BASE) MCG/ACT Inhaler, Inhale 2 puffs into the lungs every 6 hours, Disp: 1 Inhaler, Rfl: 1     SUMAtriptan (IMITREX) 100 MG tablet, Take  1/2 - 1 tab by mouth at the onset of headache. May repeat in 2 hours if needed.  Max 2/day, Disp: 18 tablet, Rfl: 1     vitamin D (ERGOCALCIFEROL) 16860 UNIT capsule, Take one tablet twice a week for 8 weeks.  Then have lab re-checked., Disp: 16 capsule, Rfl: 0      Allergies:   Asa [aspirin]      Past Medical History:  Colon adenoma, tubular adenoma  Osteopenia  Keloid of skin  Vitamin D deficiency  Blepharoplasm     Past Surgical History:  The patient does not have any past pertinent surgical history.      Family History:   Mother: Pituitary tumor  Father: Glaucoma  Other: Lipids      Social History:   Marital Status:   Presents to the clinic ***  Tobacco Use: Never smoker, never used  Alcohol Use: Yes (\"very occasional\")  PCP: Daly Euceda     Physical Exam:   There were no vitals taken for this visit.   Wt Readings from Last 1 Encounters:   08/01/17 70.3 kg (155 lb)     Constitutional: no distress, comfortable, pleasant   Eyes: anicteric, conjunctiva pink, normal extra-ocular movements   Ears, Nose and Throat: tympanic membranes clear, nose clear and free of lesions, throat clear.   Neck: supple with full range of motion, no thyromegaly.   Cardiovascular: regular rate and rhythm, normal S1 and S2, no murmurs, rubs or gallops, peripheral pulses full and " symmetric   Respiratory: clear to auscultation, no wheezes or crackles, normal breath sounds   Gastrointestinal: positive bowel sounds, nontender, no hepatosplenomegaly, no masses   Musculoskeletal: full range of motion, no edema   Skin: no concerning lesions, no jaundice, temp normal   Neurological: cranial nerves intact, normal strength and sensation, reflexes at patella and biceps normal, normal gait, normal speech, no tremor. A and O x 3, good historian.  Psychological: appropriate mood, good eye contact, normal affect   Lymph: no axillary, cervical,  supraclavicular, infraclavicular or inguinal nodes.  ***      Assessment and Plan:  There are no diagnoses linked to this encounter.     Follow-up: Data Unavailable         Scribe Disclosure:   I, Amara Ramos, am serving as a scribe to document services personally performed by DARIO Godoy CNP at this visit, based upon the provider's statements to me. All documentation has been reviewed by the aforementioned provider prior to being entered into the official medical record.     Portions of this medical record were completed by a scribe. UPON MY REVIEW AND AUTHENTICATION BY ELECTRONIC SIGNATURE, this confirms (a) I performed the applicable clinical services, and (b) the record is accurate.   Answers for HPI/ROS submitted by the patient on 9/10/2018   General Symptoms: No  Skin Symptoms: Yes  HENT Symptoms: No  EYE SYMPTOMS: No  HEART SYMPTOMS: No  LUNG SYMPTOMS: No  INTESTINAL SYMPTOMS: No  URINARY SYMPTOMS: No  GYNECOLOGIC SYMPTOMS: No  BREAST SYMPTOMS: No  SKELETAL SYMPTOMS: No  BLOOD SYMPTOMS: No  NERVOUS SYSTEM SYMPTOMS: No  MENTAL HEALTH SYMPTOMS: No  Changes in hair: No  Changes in moles/birth marks: Yes  Itching: No  Rashes: No  Changes in nails: No  Acne: No  Hair in places you don't want it: No  Change in facial hair: No  Warts: No  Non-healing sores: No  Scarring: No  Flaking of skin: No  Color changes of hands/feet in cold : No  Sun  sensitivity: No  Skin thickening: No

## 2018-09-13 NOTE — MR AVS SNAPSHOT
After Visit Summary   9/13/2018    Barb Vazquez    MRN: [id number]           Patient Information     Date Of Birth          1960        Visit Information        Provider Department      9/13/2018 5:00 PM Daly Euceda APRN CNP [facility]        Today's Diagnoses     ERRONEOUS ENCOUNTER--DISREGARD    -  1       Follow-ups after your visit        Who to contact     Please call your clinic at [phone] to:    Ask questions about your health    Make or cancel appointments    Discuss your medicines    Learn about your test results    Speak to your doctor            Additional Information About Your Visit        MyChart Information     [email or web address] Application gives you secure access to your electronic health record. If you see a primary care provider, you can also send messages to your care team and make appointments. If you have questions, please call your primary care clinic.  If you do not have a primary care provider, please call [phone] and they will assist you.      [email or web address] Application is an electronic gateway that provides easy, online access to your medical records. With [email or web address] Application, you can request a clinic appointment, read your test results, renew a prescription or communicate with your care team.     To access your existing account, please contact your [facility] or call [phone] for assistance.        Care EveryWhere ID     This is your Care EveryWhere ID. This could be used by other organizations to access your [place] medical records  [id number]         Blood Pressure from Last 3 Encounters:   08/01/17 102/63   08/04/16 110/71   07/28/15 102/65    Weight from Last 3 Encounters:   08/01/17 70.3 kg (155 lb)   08/04/16 68.7 kg (151 lb 6.4 oz)   07/28/15 71.5 kg (157 lb 9.6 oz)              Today, you had the following     No orders found for display       Primary Care Provider Office Phone # Fax #    DARIO Carroll [phone]  [phone]       [address] [place] [address] [address]  [facility] [address]        Equal Access to Services     FABI SINGLETON : Hadii aad ku hadalejandroelvira Jayceechai, wacaitieda lupepitoradhaha, qaybta kaadida tusharrobson, heidi corwin dezadelso finleysj frias nirali sadler. So [facility] [phone].    ATENCIÓN: Si habla español, tiene a davis disposición servicios gratuitos de asistencia lingüística. Sanjayame al [phone].    We comply with applicable federal civil rights laws and [place] laws. We do not discriminate on the basis of race, color, national origin, age, disability, sex, sexual orientation, or gender identity.            Thank you!     Thank you for choosing [facility]  for your care. Our goal is always to provide you with excellent care. Hearing back from our patients is one way we can continue to improve our services. Please take a few minutes to complete the written survey that you may receive in the mail after your visit with us. Thank you!             Your Updated Medication List - Protect others around you: Learn how to safely use, store and throw away your medicines at www.disposemymeds.org.          This list is accurate as of 9/13/18 11:59 PM.  Always use your most recent med list.                   Brand Name Dispense Instructions for use Diagnosis    albuterol 108 (90 Base) MCG/ACT inhaler    PROAIR HFA/PROVENTIL HFA/VENTOLIN HFA    1 Inhaler    Inhale 2 puffs into the lungs every 6 hours    Acute bronchospasm       SUMAtriptan 100 MG tablet    IMITREX    18 tablet    Take  1/2 - 1 tab by mouth at the onset of headache. May repeat in 2 hours if needed.  Max 2/day    Frequent headaches       vitamin D 95419 UNIT capsule    ERGOCALCIFEROL    16 capsule    Take one tablet twice a week for 8 weeks.  Then have lab re-checked.    Unspecified vitamin D deficiency

## 2018-09-15 NOTE — PROGRESS NOTES
This encounter was opened in error. Please disregard.  Answers for HPI/ROS submitted by the patient on 9/10/2018   General Symptoms: No  Skin Symptoms: Yes  HENT Symptoms: No  EYE SYMPTOMS: No  HEART SYMPTOMS: No  LUNG SYMPTOMS: No  INTESTINAL SYMPTOMS: No  URINARY SYMPTOMS: No  GYNECOLOGIC SYMPTOMS: No  BREAST SYMPTOMS: No  SKELETAL SYMPTOMS: No  BLOOD SYMPTOMS: No  NERVOUS SYSTEM SYMPTOMS: No  MENTAL HEALTH SYMPTOMS: No  Changes in hair: No  Changes in moles/birth marks: Yes  Itching: No  Rashes: No  Changes in nails: No  Acne: No  Hair in places you don't want it: No  Change in facial hair: No  Warts: No  Non-healing sores: No  Scarring: No  Flaking of skin: No  Color changes of hands/feet in cold : No  Sun sensitivity: No  Skin thickening: No

## 2018-10-11 ENCOUNTER — OFFICE VISIT (OUTPATIENT)
Dept: INTERNAL MEDICINE | Facility: CLINIC | Age: 58
End: 2018-10-11
Payer: COMMERCIAL

## 2018-10-11 VITALS
HEART RATE: 57 BPM | HEIGHT: 64 IN | WEIGHT: 156.1 LBS | DIASTOLIC BLOOD PRESSURE: 70 MMHG | SYSTOLIC BLOOD PRESSURE: 107 MMHG | BODY MASS INDEX: 26.65 KG/M2

## 2018-10-11 DIAGNOSIS — Z13.21 ENCOUNTER FOR VITAMIN DEFICIENCY SCREENING: ICD-10-CM

## 2018-10-11 DIAGNOSIS — Z12.11 SCREEN FOR COLON CANCER: Primary | ICD-10-CM

## 2018-10-11 DIAGNOSIS — R79.89 ELEVATED SERUM CREATININE: ICD-10-CM

## 2018-10-11 DIAGNOSIS — Z11.4 SCREENING FOR HUMAN IMMUNODEFICIENCY VIRUS: ICD-10-CM

## 2018-10-11 ASSESSMENT — PAIN SCALES - GENERAL: PAINLEVEL: NO PAIN (0)

## 2018-10-11 NOTE — MR AVS SNAPSHOT
After Visit Summary   10/11/2018    Barb Vazquez    MRN: [id number]           Patient Information     Date Of Birth          1960        Visit Information        Provider Department      10/11/2018 5:00 PM Daly Euceda, APRN CNP [facility]        Today's Diagnoses     Screen for colon cancer    -  1    Screening for human immunodeficiency virus        Encounter for vitamin deficiency screening        Elevated serum creatinine          Care Instructions    Please call [phone] to schedule lab appointment.     Primary Care Center [phone] ([facility])             Follow-ups after your visit        Future tests that were ordered for you today     Open Future Orders        Priority Expected Expires Ordered    Vitamin D Deficiency Routine 10/11/2018 10/11/2019 10/11/2018    HIV Antigen Antibody Combo Routine 10/11/2018 10/11/2019 10/11/2018    Lipid panel reflex to direct LDL Fasting Routine 10/11/2018 10/11/2019 10/11/2018            Who to contact     Please call your clinic at [phone] to:    Ask questions about your health    Make or cancel appointments    Discuss your medicines    Learn about your test results    Speak to your doctor            Additional Information About Your Visit        MyChart Information     BURLESQUICEOUS gives you secure access to your electronic health record. If you see a primary care provider, you can also send messages to your care team and make appointments. If you have questions, please call your primary care clinic.  If you do not have a primary care provider, please call [phone] and they will assist you.      BURLESQUICEOUS is an electronic gateway that provides easy, online access to your medical records. With BURLESQUICEOUS, you can request a clinic appointment, read your test results, renew a prescription or communicate with your care team.     To access your existing account, please contact your [facility]  "Clinic or call [phone] for assistance.        Care EveryWhere ID     This is your Care EveryWhere ID. This could be used by other organizations to access your [place] medical records  [id number]        Your Vitals Were     Pulse Height BMI (Body Mass Index)             57 1.626 m (5' 4\") 26.79 kg/m2          Blood Pressure from Last 3 Encounters:   10/11/18 107/70   08/01/17 102/63   08/04/16 110/71    Weight from Last 3 Encounters:   10/11/18 70.8 kg (156 lb 1.6 oz)   08/01/17 70.3 kg (155 lb)   08/04/16 68.7 kg (151 lb 6.4 oz)              We Performed the Following     Basic metabolic panel        Primary Care Provider Office Phone # Fax #    DARIO Carroll [phone]       [address] [place] [address] [address]  [facility] [address]        Equal Access to Services     FABI SINGLETON : Hadii aad ku hadasho Soomaali, waaxda luqadaha, qaybta kaalmada adeegyada, waxay banin hayaditin nils campoverde . So [facility] [phone].    ATENCIÓN: Si habla español, tiene a davis disposición servicios gratuitos de asistencia lingüística. Llame al [phone].    We comply with applicable federal civil rights laws and [place] laws. We do not discriminate on the basis of race, color, national origin, age, disability, sex, sexual orientation, or gender identity.            Thank you!     Thank you for choosing [facility]  for your care. Our goal is always to provide you with excellent care. Hearing back from our patients is one way we can continue to improve our services. Please take a few minutes to complete the written survey that you may receive in the mail after your visit with us. Thank you!             Your Updated Medication List - Protect others around you: Learn how to safely use, store and throw away your medicines at www.disposemymeds.org.          This list is accurate as of 10/11/18  5:57 PM.  Always use your most recent med list.                   Brand Name Dispense " Instructions for use Diagnosis    albuterol 108 (90 Base) MCG/ACT inhaler    PROAIR HFA/PROVENTIL HFA/VENTOLIN HFA    1 Inhaler    Inhale 2 puffs into the lungs every 6 hours    Acute bronchospasm       SUMAtriptan 100 MG tablet    IMITREX    18 tablet    Take  1/2 - 1 tab by mouth at the onset of headache. May repeat in 2 hours if needed.  Max 2/day    Frequent headaches       vitamin D 70022 UNIT capsule    ERGOCALCIFEROL    16 capsule    Take one tablet twice a week for 8 weeks.  Then have lab re-checked.    Unspecified vitamin D deficiency

## 2018-10-11 NOTE — PROGRESS NOTES
[facility]  Primary Care Center   Daly Euceda, DARIO CNP  10/11/2018      Chief Complaint:   Preventive Physical       History of Present Illness:   Barb Vazquez is a 57 year old female with a history of vitamin D deficiency who presents for a routine physical exam. Barb would like to re-evaluate her kidney function, as her creatinine was elevated last year (see results below). She would also like to reassess her vitamin D levels given her history of vitamin D deficiency.     Other concerns discussed:  Barb has a dental appointment next week. Otherwise, her health maintenance exams are up to date.   Eye exam UTD.   Barb has a skin abnormality near her left collarbone which she reports briefly resembled a skin tag but has since become flat again which she would like me to examine.     Barb reports that she finds it harder to run on an incline than she did when she was younger. She reports that she currently runs 3 miles twice a week, and does yoga and swimming as well.      Review of Systems:   Pertinent items are noted in HPI, remainder of complete ROS is negative.      Active Medications:   Current Outpatient Prescriptions:      albuterol (PROAIR HFA/PROVENTIL HFA/VENTOLIN HFA) 108 (90 BASE) MCG/ACT Inhaler, Inhale 2 puffs into the lungs every 6 hours, Disp: 1 Inhaler, Rfl: 1     SUMAtriptan (IMITREX) 100 MG tablet, Take  1/2 - 1 tab by mouth at the onset of headache. May repeat in 2 hours if needed.  Max 2/day, Disp: 18 tablet, Rfl: 1     vitamin D (ERGOCALCIFEROL) 58123 UNIT capsule, Take one tablet twice a week for 8 weeks.  Then have lab re-checked., Disp: 16 capsule, Rfl: 0      Allergies:   Asa [aspirin]      Past Medical History:  Colon adenoma, tubular adenoma  Osteopenia  Vitamin D deficiency  Keloid of skin  Blepharospasm     Past Surgical History:  The patient does not have any past pertinent surgical history.    Family History:   Mother: Pituitary tumor, cancer  Father: Glaucoma  Other:  "Lipids      Social History:   .  Works as a school nurse.  Enjoys running.  3 grown children.      Physical Exam:   /70  Pulse 57  Ht 1.626 m (5' 4\")  Wt 70.8 kg (156 lb 1.6 oz)  BMI 26.79 kg/m2   Wt Readings from Last 1 Encounters:   10/11/18 70.8 kg (156 lb 1.6 oz)     Constitutional: no distress, comfortable, pleasant   Eyes: anicteric, conjunctiva pink. She has contact lenses.   Ears, Nose and Throat: tympanic membranes clear,  throat clear.   Neck: supple with full range of motion, no thyromegaly.   Cardiovascular: regular rate and rhythm, normal S1 and S2, no murmurs, rubs or gallops, peripheral pulses full and symmetric   Respiratory: clear to auscultation, no wheezes or crackles, normal breath sounds   Gastrointestinal: positive bowel sounds, nontender, no hepatosplenomegaly, no masses   Musculoskeletal: full range of motion, no edema   Breasts: without masses.   Skin: no jaundice, temp normal.  She has a greyish brown nevi on her left collarbone which appears WNL.    Neurological:  normal speech, no tremor. A and O x 3, good historian.  Psychological: appropriate mood, good eye contact, normal affect   Lymph: no axillary, cervical,  supraclavicular, infraclavicular or inguinal nodes.      Results Discussed:  Basic Metabolic Panel, 8/1/2017:  Component      Latest Ref Rng & Units 8/1/2017   Sodium      133 - 144 mmol/L 140   Potassium      3.4 - 5.3 mmol/L 3.6   Chloride      94 - 109 mmol/L 105   Carbon Dioxide      20 - 32 mmol/L 26   Anion Gap      3 - 14 mmol/L 9   Glucose      70 - 99 mg/dL 87   Urea Nitrogen      7 - 30 mg/dL 18   Creatinine      0.52 - 1.04 mg/dL 1.08 (H)   GFR Estimate      >60 mL/min/1.7m2 52 (L)   GFR Estimate If Black      >60 mL/min/1.7m2 63   Calcium      8.5 - 10.1 mg/dL 8.8        Assessment and Plan:  Screen for colon cancer  - Lipid panel reflex to direct LDL Fasting    Screening for human immunodeficiency virus  Barb has not previously been screened for " HIV.   - HIV Antigen Antibody Combo    Encounter for vitamin deficiency screening  See HPI.  - Vitamin D Deficiency    Elevated serum creatinine  See HPI.   - Basic metabolic panel        Scribe Disclosure:   I, Amara Ramos, am serving as a scribe to document services personally performed by DARIO Godoy CNP at this visit, based upon the provider's statements to me. All documentation has been reviewed by the aforementioned provider prior to being entered into the official medical record.     Portions of this medical record were completed by a scribe. UPON MY REVIEW AND AUTHENTICATION BY ELECTRONIC SIGNATURE, this confirms (a) I performed the applicable clinical services, and (b) the record is accurate.   Total time spent 25 minutes.  More than 50% of the time spent with Ms. Vazquez on counseling / coordinating her care        Daly BISHOP CNP

## 2018-10-11 NOTE — PATIENT INSTRUCTIONS
Please call [phone] to schedule lab appointment.     [facility] [phone] ([facility], 4th Floor N)

## 2018-10-11 NOTE — NURSING NOTE
Chief Complaint   Patient presents with     Physical       Chioma Preciado LPN at 5:04 PM on October 11, 2018

## 2018-10-18 DIAGNOSIS — Z12.11 SCREEN FOR COLON CANCER: ICD-10-CM

## 2018-10-18 DIAGNOSIS — R79.89 ELEVATED SERUM CREATININE: ICD-10-CM

## 2018-10-18 DIAGNOSIS — Z11.4 SCREENING FOR HUMAN IMMUNODEFICIENCY VIRUS: ICD-10-CM

## 2018-10-18 DIAGNOSIS — Z13.21 ENCOUNTER FOR VITAMIN DEFICIENCY SCREENING: ICD-10-CM

## 2018-10-18 LAB
ANION GAP SERPL CALCULATED.3IONS-SCNC: 5 MMOL/L (ref 3–14)
BUN SERPL-MCNC: 14 MG/DL (ref 7–30)
CALCIUM SERPL-MCNC: 8.6 MG/DL (ref 8.5–10.1)
CHLORIDE SERPL-SCNC: 107 MMOL/L (ref 94–109)
CHOLEST SERPL-MCNC: 204 MG/DL
CO2 SERPL-SCNC: 26 MMOL/L (ref 20–32)
CREAT SERPL-MCNC: 0.97 MG/DL (ref 0.52–1.04)
DEPRECATED CALCIDIOL+CALCIFEROL SERPL-MC: 20 UG/L (ref 20–75)
GFR SERPL CREATININE-BSD FRML MDRD: 59 ML/MIN/1.7M2
GLUCOSE SERPL-MCNC: 90 MG/DL (ref 70–99)
HDLC SERPL-MCNC: 69 MG/DL
HIV 1+2 AB+HIV1 P24 AG SERPL QL IA: NONREACTIVE
LDLC SERPL CALC-MCNC: 120 MG/DL
NONHDLC SERPL-MCNC: 134 MG/DL
POTASSIUM SERPL-SCNC: 4 MMOL/L (ref 3.4–5.3)
SODIUM SERPL-SCNC: 138 MMOL/L (ref 133–144)
TRIGL SERPL-MCNC: 72 MG/DL

## 2019-09-09 ENCOUNTER — ANCILLARY PROCEDURE (OUTPATIENT)
Dept: MAMMOGRAPHY | Facility: CLINIC | Age: 59
End: 2019-09-09
Attending: NURSE PRACTITIONER
Payer: COMMERCIAL

## 2019-09-09 DIAGNOSIS — Z12.31 VISIT FOR SCREENING MAMMOGRAM: ICD-10-CM

## 2019-09-09 PROCEDURE — 77067 SCR MAMMO BI INCL CAD: CPT

## 2019-09-28 ENCOUNTER — HEALTH MAINTENANCE LETTER (OUTPATIENT)
Age: 59
End: 2019-09-28

## 2019-10-27 ENCOUNTER — HEALTH MAINTENANCE LETTER (OUTPATIENT)
Age: 59
End: 2019-10-27

## 2019-10-31 ENCOUNTER — PRE VISIT (OUTPATIENT)
Dept: INTERNAL MEDICINE | Facility: CLINIC | Age: 59
End: 2019-10-31

## 2019-10-31 NOTE — TELEPHONE ENCOUNTER
[facility]  Dept: [phone]     Patient: Barb Vazquez   : 1960  MRN: [id number]  Encounter: 10/31/19       Pre Visit Assessment    Health Maintenance Due   Topic Date Due     ADVANCE CARE PLANNING  1960     ZOSTER IMMUNIZATION (1 of 2) 2008     PHQ-2  2019     INFLUENZA VACCINE (1) 2019     FIT  10/11/2019     PREVENTIVE CARE VISIT  10/11/2019     Chart Reviewed for Labs needed/Health Maintenance: Yes   If labs needed, orders placed:  No labs needed ,   Lab appointment scheduled:  N/A    Notes:  Patient confirmed they will attend appointment:  N/A  Appointment rescheduled?  N/A      Awa Murray at 1:53 PM on 10/31/2019

## 2019-11-14 ENCOUNTER — OFFICE VISIT (OUTPATIENT)
Dept: INTERNAL MEDICINE | Facility: CLINIC | Age: 59
End: 2019-11-14
Payer: COMMERCIAL

## 2019-11-14 VITALS
RESPIRATION RATE: 16 BRPM | OXYGEN SATURATION: 100 % | DIASTOLIC BLOOD PRESSURE: 71 MMHG | BODY MASS INDEX: 26.79 KG/M2 | HEIGHT: 64 IN | SYSTOLIC BLOOD PRESSURE: 111 MMHG | WEIGHT: 156.9 LBS | HEART RATE: 56 BPM

## 2019-11-14 DIAGNOSIS — B02.9 HERPES ZOSTER WITHOUT COMPLICATION: Primary | ICD-10-CM

## 2019-11-14 DIAGNOSIS — E78.00 ELEVATED LDL CHOLESTEROL LEVEL: ICD-10-CM

## 2019-11-14 DIAGNOSIS — Z13.1 SCREENING FOR DIABETES MELLITUS: ICD-10-CM

## 2019-11-14 RX ORDER — VALACYCLOVIR HYDROCHLORIDE 1 G/1
1000 TABLET, FILM COATED ORAL 3 TIMES DAILY
Qty: 21 TABLET | Refills: 0 | Status: SHIPPED | OUTPATIENT
Start: 2019-11-14 | End: 2021-04-08

## 2019-11-14 ASSESSMENT — PAIN SCALES - GENERAL: PAINLEVEL: MODERATE PAIN (4)

## 2019-11-14 ASSESSMENT — MIFFLIN-ST. JEOR: SCORE: 1269.44

## 2019-11-14 NOTE — LETTER
Barb Hastingslennox  [address]  [facility] [address]  : 1960  MRN:  [id number]      2019          Ms. Vazquez was in for her annual preventive physical.  She is compliant with all testing.                Daly BISHOP, CNP

## 2019-11-14 NOTE — PROGRESS NOTES
[facility]  Primary Care Center   Daly GARCIASilvana Randyliliana, DARIO CNP  11/14/2019      Chief Complaint:   Preventive Physical    History of Present Illness:   Barb Vazquez is a 58 year old female with a history of vitamin D deficiency and osteopeia who presents for an annual physical exam.     Shingles  A week ago, she developed a stinging, burning rash on her back that she thinks is shingles. She notes it started as itching and burning sensation, then a rash developed on 11/11/19 and began blistering last night. She originally thought it was from a new bra she was wearing. She notes the pain is forgettable during work, but it does keep her up at night. She has managed with ibuprofen.     Other concerns discussed:  1. Flu Shot - Already received.   2. Vitamin D - Borderline low on 10/18/18. She has not been taking it regularly, but plans to.   3. Exercise - She runs two days a week, swims, yoga, and a HIIT class. She continues to do running races.   4. Lipids - LDL and total cholesterol elevated in 10/2018.   5. Eye Exam - UTD.   6. Dental Exam - UTD.     Health Maintenance   Mammogram (females age 40 - 75): yearly or every 2 years - Up to date, done 9/2019  Pap (females age 21 - 65): every 3 years, every 5 years after age 35 if cotesting done - Up to date, done in 2017  Colon Cancer screening (age 50 - 75): yearly FIT or colonoscopy every 5 - 10 years - Up to date, done in 2017, due in 07/2020   Glucose: every 5 years, yearly if risk factors - Up to date  Lipid panel: every 5 years, yearly if risk factors - Up to date  Immunizations (Tetanus every 10 years, Influenza yearly, Pneumonia PPV-23 and PCV-13 age ? 65 or sooner if risk factors) - Up to date      The following health maintenance issues were also reviewed and addressed as needed:  Blood pressure (>18 years annually)  Depression - PHQ-2 Score: 0/6  Lifestyle habits (including exercise, diet, weight)  Routine eye, dental, hearing, and skin exams     Review of  "Systems:   Pertinent items are noted in HPI or as in patient entered ROS below, remainder of complete ROS is negative.     Active Medications:      valACYclovir (VALTREX) 1000 mg tablet, Take 1 tablet (1,000 mg) by mouth 3 times daily for 7 days, Disp: 21 tablet, Rfl: 0     vitamin D (ERGOCALCIFEROL) 37222 UNIT capsule, Take one tablet twice a week for 8 weeks.  Then have lab re-checked.     Allergies:   Asa [aspirin]      Past Medical History:  Colon adenoma, tubular adenoma  Osteopenia  Vitamin D deficiency  Keloid of skin  Blepharospasm     Past Surgical History:  History reviewed. No pertinent past surgical history.     Family History:   Mother: Pituitary tumor, cancer  Father: Glaucoma  Other: Lipids      Immunizations:  Immunization History   Administered Date(s) Administered     HEPA 04/03/2009, 08/16/2011     Influenza Vaccine IM > 6 months Valent IIV4 10/06/2018     TD (ADULT, 7+) 08/03/1994     TDAP Vaccine (Boostrix) 08/16/2011     Yellow Fever 04/03/2009      Social History:   The patient was alone.   Smoking Status: Never smoker    Smokeless Tobacco: Never used   Alcohol Use: Yes   Marital Status: Single  Employment status: School nurse, plans to retire at age 63  Home: Lives in [place], recently bought a house      Physical Exam:   /71   Pulse 56   Resp 16   Ht 1.63 m (5' 4.17\")   Wt 71.2 kg (156 lb 14.4 oz)   SpO2 100%   Breastfeeding No   BMI 26.79 kg/m       Wt Readings from Last 1 Encounters:   11/14/19 71.2 kg (156 lb 14.4 oz)     Constitutional: no distress, comfortable, pleasant   Eyes: anicteric, conjunctiva pink, she has contact lenses.  Ears, Nose and Throat: tympanic membranes clear, , throat clear.   Neck: supple with full range of motion, no thyromegaly.   BREAST: normal without masses, tenderness or nipple discharge and no palpable axillary masses or adenopathy  Cardiovascular: regular rate and rhythm, normal S1 and S2, no murmurs, rubs or gallops, peripheral pulses full " and symmetric   Respiratory: clear to auscultation, no wheezes or crackles, normal breath sounds   Gastrointestinal: positive bowel sounds, nontender, no hepatosplenomegaly, no masses    (female): normal female external genitalia, Skeene and Bartholin's glands normal, vaginal mucosa pink, moist, well rugated and normal cervix, adnexae, and uterus without masses. Normal vaginal discharge  Musculoskeletal: full range of motion, no edema   Skin: left back 3 cm x 1 3/4 cm vesicular lesions on back, with 2 lesions paraspinal and one lesion just lateral of sternum.    Neurological: normal gait, normal speech, no tremor. A and O x 3, good historian.  Psychological: appropriate mood, good eye contact, normal affect   Lymph: no axillary, cervical,  supraclavicular, infraclavicular or inguinal nodes.      Assessment and Plan:  Herpes zoster without complication  Blistering rash consistent with shingles on her back today. Manages pain well with ibuprofen. Provided Valtrex course. Encouraged her to check with her insurance and receive the Shingrix vaccine when the rash resolves.   - valACYclovir (VALTREX) 1000 mg tablet  Dispense: 21 tablet; Refill: 0    Elevated LDL cholesterol level  Will complete future fasting labs at  clinic near her.  - Lipid panel reflex to direct LDL Fasting      Shingrix Vaccine  -   HC ZOSTER VACCINE RECOMBINANT ADJUVANTED IM NJX; Future.    Screening for diabetes mellitus  -   Basic metabolic panel; Future     Encouraged to take vitamin D supplement regularly. Provided with documentation of her visit today for her insurance. She will let me know if she needs an order for her colonoscopy which is due in 2020.     Total time spent 40 minutes.  More than 50% of the time spent with Ms. Vazquez on counseling / coordinating her care.    Daly BISHOP, CNP         Scribe Disclosure:  Kassidy NEIL, am serving as a scribe to document services personally performed by DARIO Godoy  CNP at this visit, based upon the provider's statements to me. All documentation has been reviewed by the aforementioned provider prior to being entered into the official medical record.    Portions of this medical record were completed by a scribe. UPON MY REVIEW AND AUTHENTICATION BY ELECTRONIC SIGNATURE, this confirms (a) I performed the applicable clinical services, and (b) the record is accurate.

## 2019-11-14 NOTE — PATIENT INSTRUCTIONS
Primary Care Center Medication Refill Request Information:  * Please contact your pharmacy regarding ANY request for medication refills.  ** [facility] Prescription Fax = [phone]  * Please allow 3 business days for routine medication refills.  * Please allow 5 business days for controlled substance medication refills.     Primary Care Center Test Result notification information:  *You will be notified with in 7-10 days of your appointment day regarding the results of your test.  If you are on MyChart you will be notified as soon as the provider has reviewed the results and signed off on them.

## 2019-11-15 ENCOUNTER — MYC MEDICAL ADVICE (OUTPATIENT)
Dept: INTERNAL MEDICINE | Facility: CLINIC | Age: 59
End: 2019-11-15

## 2019-11-15 DIAGNOSIS — M79.2 NEUROPATHIC PAIN: Primary | ICD-10-CM

## 2019-11-15 DIAGNOSIS — B02.9 HERPES ZOSTER WITHOUT COMPLICATION: Primary | ICD-10-CM

## 2019-11-15 RX ORDER — OXYCODONE HYDROCHLORIDE 5 MG/1
TABLET ORAL
Qty: 20 TABLET | Refills: 0 | Status: SHIPPED | OUTPATIENT
Start: 2019-11-15 | End: 2021-04-07

## 2019-11-20 RX ORDER — GABAPENTIN 300 MG/1
CAPSULE ORAL
Qty: 60 CAPSULE | Refills: 1 | Status: SHIPPED | OUTPATIENT
Start: 2019-11-20 | End: 2021-04-08

## 2019-12-30 DIAGNOSIS — E78.00 ELEVATED LDL CHOLESTEROL LEVEL: ICD-10-CM

## 2019-12-30 DIAGNOSIS — Z13.1 SCREENING FOR DIABETES MELLITUS: ICD-10-CM

## 2019-12-30 LAB
ANION GAP SERPL CALCULATED.3IONS-SCNC: 5 MMOL/L (ref 3–14)
BUN SERPL-MCNC: 17 MG/DL (ref 7–30)
CALCIUM SERPL-MCNC: 9.3 MG/DL (ref 8.5–10.1)
CHLORIDE SERPL-SCNC: 109 MMOL/L (ref 94–109)
CHOLEST SERPL-MCNC: 239 MG/DL
CO2 SERPL-SCNC: 26 MMOL/L (ref 20–32)
CREAT SERPL-MCNC: 1.02 MG/DL (ref 0.52–1.04)
GFR SERPL CREATININE-BSD FRML MDRD: 59 ML/MIN/{1.73_M2}
GLUCOSE SERPL-MCNC: 91 MG/DL (ref 70–99)
HDLC SERPL-MCNC: 80 MG/DL
LDLC SERPL CALC-MCNC: 139 MG/DL
NONHDLC SERPL-MCNC: 159 MG/DL
POTASSIUM SERPL-SCNC: 4.1 MMOL/L (ref 3.4–5.3)
SODIUM SERPL-SCNC: 140 MMOL/L (ref 133–144)
TRIGL SERPL-MCNC: 99 MG/DL

## 2019-12-30 PROCEDURE — 36415 COLL VENOUS BLD VENIPUNCTURE: CPT | Performed by: NURSE PRACTITIONER

## 2019-12-30 PROCEDURE — 80048 BASIC METABOLIC PNL TOTAL CA: CPT | Performed by: NURSE PRACTITIONER

## 2019-12-30 PROCEDURE — 80061 LIPID PANEL: CPT | Performed by: NURSE PRACTITIONER

## 2020-09-14 ENCOUNTER — ANCILLARY PROCEDURE (OUTPATIENT)
Dept: MAMMOGRAPHY | Facility: CLINIC | Age: 60
End: 2020-09-14
Attending: NURSE PRACTITIONER
Payer: COMMERCIAL

## 2020-09-14 DIAGNOSIS — Z12.31 VISIT FOR SCREENING MAMMOGRAM: ICD-10-CM

## 2020-09-14 PROCEDURE — 77067 SCR MAMMO BI INCL CAD: CPT

## 2020-10-07 ENCOUNTER — DOCUMENTATION ONLY (OUTPATIENT)
Dept: INTERNAL MEDICINE | Facility: CLINIC | Age: 60
End: 2020-10-07

## 2021-01-10 ENCOUNTER — HEALTH MAINTENANCE LETTER (OUTPATIENT)
Age: 61
End: 2021-01-10

## 2021-01-21 ENCOUNTER — IMMUNIZATION (OUTPATIENT)
Dept: NURSING | Facility: CLINIC | Age: 61
End: 2021-01-21
Payer: COMMERCIAL

## 2021-01-21 PROCEDURE — 0001A PR COVID VAC PFIZER DIL RECON 30 MCG/0.3 ML IM: CPT

## 2021-01-21 PROCEDURE — 91300 PR COVID VAC PFIZER DIL RECON 30 MCG/0.3 ML IM: CPT

## 2021-02-11 ENCOUNTER — IMMUNIZATION (OUTPATIENT)
Dept: NURSING | Facility: CLINIC | Age: 61
End: 2021-02-11
Attending: FAMILY MEDICINE
Payer: COMMERCIAL

## 2021-02-11 PROCEDURE — 91300 PR COVID VAC PFIZER DIL RECON 30 MCG/0.3 ML IM: CPT

## 2021-02-11 PROCEDURE — 0002A PR COVID VAC PFIZER DIL RECON 30 MCG/0.3 ML IM: CPT

## 2021-04-07 ASSESSMENT — ENCOUNTER SYMPTOMS
ORTHOPNEA: 0
SLEEP DISTURBANCES DUE TO BREATHING: 0
HYPERTENSION: 0
SKIN CHANGES: 0
LIGHT-HEADEDNESS: 1
LEG PAIN: 0
EXERCISE INTOLERANCE: 0
POOR WOUND HEALING: 0
SYNCOPE: 0
HYPOTENSION: 0
NAIL CHANGES: 0
PALPITATIONS: 0

## 2021-04-08 ENCOUNTER — ANCILLARY PROCEDURE (OUTPATIENT)
Dept: BONE DENSITY | Facility: CLINIC | Age: 61
End: 2021-04-08
Attending: NURSE PRACTITIONER
Payer: COMMERCIAL

## 2021-04-08 ENCOUNTER — OFFICE VISIT (OUTPATIENT)
Dept: INTERNAL MEDICINE | Facility: CLINIC | Age: 61
End: 2021-04-08
Payer: COMMERCIAL

## 2021-04-08 VITALS
HEART RATE: 72 BPM | WEIGHT: 152 LBS | DIASTOLIC BLOOD PRESSURE: 62 MMHG | SYSTOLIC BLOOD PRESSURE: 98 MMHG | OXYGEN SATURATION: 99 % | BODY MASS INDEX: 25.95 KG/M2

## 2021-04-08 DIAGNOSIS — Z13.0 SCREENING FOR DEFICIENCY ANEMIA: ICD-10-CM

## 2021-04-08 DIAGNOSIS — Z13.1 SCREENING FOR DIABETES MELLITUS: ICD-10-CM

## 2021-04-08 DIAGNOSIS — Z23 NEED FOR TDAP VACCINATION: Primary | ICD-10-CM

## 2021-04-08 DIAGNOSIS — Z13.29 SCREENING FOR THYROID DISORDER: ICD-10-CM

## 2021-04-08 DIAGNOSIS — M85.80 OSTEOPENIA, UNSPECIFIED LOCATION: ICD-10-CM

## 2021-04-08 DIAGNOSIS — E78.00 ELEVATED LDL CHOLESTEROL LEVEL: ICD-10-CM

## 2021-04-08 LAB
ANION GAP SERPL CALCULATED.3IONS-SCNC: 5 MMOL/L (ref 3–14)
BUN SERPL-MCNC: 15 MG/DL (ref 7–30)
CALCIUM SERPL-MCNC: 9.3 MG/DL (ref 8.5–10.1)
CHLORIDE SERPL-SCNC: 107 MMOL/L (ref 94–109)
CHOLEST SERPL-MCNC: 224 MG/DL
CO2 SERPL-SCNC: 27 MMOL/L (ref 20–32)
CREAT SERPL-MCNC: 1.02 MG/DL (ref 0.52–1.04)
ERYTHROCYTE [DISTWIDTH] IN BLOOD BY AUTOMATED COUNT: 12.6 % (ref 10–15)
GFR SERPL CREATININE-BSD FRML MDRD: 59 ML/MIN/{1.73_M2}
GLUCOSE SERPL-MCNC: 92 MG/DL (ref 70–99)
HCT VFR BLD AUTO: 44 % (ref 35–47)
HDLC SERPL-MCNC: 75 MG/DL
HGB BLD-MCNC: 14.1 G/DL (ref 11.7–15.7)
LDLC SERPL CALC-MCNC: 137 MG/DL
MCH RBC QN AUTO: 30.9 PG (ref 26.5–33)
MCHC RBC AUTO-ENTMCNC: 32 G/DL (ref 31.5–36.5)
MCV RBC AUTO: 97 FL (ref 78–100)
NONHDLC SERPL-MCNC: 149 MG/DL
PLATELET # BLD AUTO: 250 10E9/L (ref 150–450)
POTASSIUM SERPL-SCNC: 4.1 MMOL/L (ref 3.4–5.3)
RBC # BLD AUTO: 4.56 10E12/L (ref 3.8–5.2)
SODIUM SERPL-SCNC: 140 MMOL/L (ref 133–144)
TRIGL SERPL-MCNC: 62 MG/DL
TSH SERPL DL<=0.005 MIU/L-ACNC: 1.19 MU/L (ref 0.4–4)
WBC # BLD AUTO: 7.2 10E9/L (ref 4–11)

## 2021-04-08 PROCEDURE — 85027 COMPLETE CBC AUTOMATED: CPT | Performed by: PATHOLOGY

## 2021-04-08 PROCEDURE — 77080 DXA BONE DENSITY AXIAL: CPT | Performed by: INTERNAL MEDICINE

## 2021-04-08 PROCEDURE — 84443 ASSAY THYROID STIM HORMONE: CPT | Performed by: PATHOLOGY

## 2021-04-08 PROCEDURE — 90471 IMMUNIZATION ADMIN: CPT | Performed by: NURSE PRACTITIONER

## 2021-04-08 PROCEDURE — 36415 COLL VENOUS BLD VENIPUNCTURE: CPT | Performed by: PATHOLOGY

## 2021-04-08 PROCEDURE — 80048 BASIC METABOLIC PNL TOTAL CA: CPT | Performed by: PATHOLOGY

## 2021-04-08 PROCEDURE — 90715 TDAP VACCINE 7 YRS/> IM: CPT | Performed by: NURSE PRACTITIONER

## 2021-04-08 PROCEDURE — 99396 PREV VISIT EST AGE 40-64: CPT | Mod: 25 | Performed by: NURSE PRACTITIONER

## 2021-04-08 PROCEDURE — 80061 LIPID PANEL: CPT | Performed by: PATHOLOGY

## 2021-04-08 ASSESSMENT — PAIN SCALES - GENERAL: PAINLEVEL: NO PAIN (0)

## 2021-04-08 NOTE — PROGRESS NOTES
Chief Complaint:   Preventive Physical       Patient Active Problem List   Diagnosis     CARDIOVASCULAR SCREENING; LDL GOAL LESS THAN 160     Blepharospasm     Keloid of skin     Osteopenia     Vitamin D deficiency     Colon adenoma, tubular adenoma     No current outpatient medications on file.     No current facility-administered medications for this visit.        History of Present Illness:   Barb Vazquez is a 60 year old female with a history of vitamin D deficiency and osteopeia who presents for an annual physical exam.              Other concerns discussed:  1. Flu Shot - Already received.   2. Exercise - she walks, does intensity work-outs and yoga and weights.  3. Lipids - LDL and total cholesterol elevated in 10/2018. WIll re-check  4. Eye Exam - UTD.   5. Dental Exam - scheduled today.     Health Maintenance   Mammogram (females age 40 - 75): yearly or every 2 years - Up to date, done10/14/21  Pap (females age 21 - 65): every 3 years, every 5 years after age 35 if cotesting done - Up to date, done in 2017  Colon Cancer screening (age 50 - 75): yearly FIT or colonoscopy every 5 - 10 years - Up to date, done in 2017, due in 07/2020   Glucose: every 5 years, yearly if risk factors -  Lipid panel: every 5 years, yearly if risk factors -  Immunizations (Tetanus every 10 years, Influenza yearly, Pneumonia PPV-23 and PCV-13 age ? 65 or sooner if risk factors) - Up to date      The following health maintenance issues were also reviewed and addressed as needed:  Blood pressure (>18 years annually)  Depression - PHQ-2 Score: 0/6  Lifestyle habits (including exercise, diet, weight)  Routine eye, dental, hearing, and skin exams     Review of Systems:   Pertinent items are noted in HPI or as in patient entered ROS below, remainder of complete ROS is negative.    She has had issues with urine incontinence and so has stopped running. Also has hip pain with running.    Active Medications:   none     Allergies:   Asa  [aspirin]      Past Medical History:  Colon adenoma, tubular adenoma  Osteopenia  Vitamin D deficiency  Keloid of skin  Blepharospasm     Past Surgical History:  History reviewed. No pertinent past surgical history.      Family History:   Mother: Pituitary tumor, cancer  Father: Glaucoma  Other: Lipids       Immunization History   Administered Date(s) Administered     COVID-19,PF,Pfizer 01/21/2021, 02/11/2021     HEPA 04/03/2009, 08/16/2011     HepA-Adult 04/03/2009, 08/16/2011     Influenza (IIV3) PF 10/10/2013     Influenza Vaccine IM > 6 months Valent IIV4 10/16/2014, 10/06/2018     TD (ADULT, 7+) 08/03/1994     TDAP Vaccine (Boostrix) 08/16/2011     Td (Adult), Adsorbed 08/03/1994     Yellow Fever 04/03/2009     Zoster vaccine recombinant adjuvanted (SHINGRIX) 08/01/2020, 10/05/2020       Social History:   The patient was alone.   Smoking Status: Never smoker    Smokeless Tobacco: Never used   Alcohol Use: Yes   Marital Status: Single  Employment status: School nurse, plans to retire at age 63  Home: Lives in [place], recently bought a house      Physical Exam:   BP 98/62 (BP Location: Right arm, Patient Position: Sitting, Cuff Size: Adult Regular)   Pulse 72   Wt 68.9 kg (152 lb)   SpO2 99%   BMI 25.95 kg/m      Constitutional: no distress, comfortable, pleasant   Eyes: anicteric, conjunctiva pink, she has contact lenses.  Neck: supple with full range of motion, no thyromegaly.   BREAST: normal without masses, tenderness or nipple discharge and no palpable axillary masses or adenopathy  Cardiovascular: regular rate and rhythm, normal S1 and S2, no murmurs, rubs or gallops, peripheral pulses full and symmetric   Respiratory: clear to auscultation, no wheezes or crackles, normal breath sounds   Gastrointestinal: positive bowel sounds, nontender, no hepatosplenomegaly, no masses   Musculoskeletal: full range of motion, no edema   Skin:No rashes..   Neurological: normal gait, normal speech, no tremor. A and  O x 3, good historian.  Psychological: appropriate mood, good eye contact, normal affect   Lymph: no axillary, cervical,  supraclavicular, infraclavicular or inguinal nodes.      Assessment and Plan:  Barb was seen today for physical.    Diagnoses and all orders for this visit:    Need for Tdap vaccination  -     TDAP (ADACEL,BOOSTRIX)    Osteopenia, unspecified location  -     Dexa hip/pelvis/spine*; Future    Screening for diabetes mellitus  -     Basic metabolic panel; Future    Screening for deficiency anemia  -     CBC with platelets; Future    Screening for thyroid disorder  -     TSH with free T4 reflex; Future       Elevated LDL cholesterol level  - Lipid panel reflex to direct LDL Fasting       Eyelid seborrhea   The management of eyelid dermatitis involves ongoing avoidance of exposure to irritants and allergens for patients with contact dermatitis and the use of topical anti-inflammatory  agents, including topical corticosteroids and topical calcineurin inhibitors. There are no randomized trials assessing the efficacy and safety of these agents for eyelid eczema, and their  use is based on limited evidence from small observational studies, indirect evidence based on their use in other types of eczema, and clinical experience.     Skin care -- Conservative initial management of eyelid dermatitis includes gentle skin care and avoidance of fragrance and other known irritants in personal care, hair, and facial skin  care products. Cascade, fragrance-free emollients, such as petrolatum, may be applied directly to the eyelids.   Avoidance of irritants and allergens -- For patients with a confirmed diagnosis of irritant or allergic contact eyelid dermatitis, ongoing avoidance of irritants and allergens is the mainstay  of treatment. Petrolatum or other ointment-based emollients that are free of fragrance and other common allergens may be used. The use of perfume and aerosol hair sprays should  be avoided.  "     Topical corticosteroids -- We suggest topical corticosteroids rather than topical calcineurin inhibitors as the first-line therapy for eyelid dermatitis. As the eyelids exhibit the highest  percutaneous absorption on the body (and in the setting of active dermatitis, where the skin barrier is broken, the absorption may be even higher), only low-potency topical  corticosteroids (groups 6 and 7 (table 1)) are safe for short-term use on the eyelids. We typically use low- potency topical corticosteroids twice daily for up to two to four weeks. If  needed, treatment with topical corticosteroids can be repeated after a \"steroid holiday\" of one to two weeks before application is resumed.   Low potency  (group 6) Alclometasone dipropionate Cream, ointment Aclovate 0.05    Betamethasone valerate Lotion Beta-Aline , Valisone  0.1    Desonide Cream DesOwen, Tridesilon  0.05     Lotion DesOwen, LoKara 0.05     Foam Verdeso 0.05    Fluocinolone acetonide Cream, solution Synalar  0.01     Shampoo Capex 0.01     Oil? Derma-Smoothe/FS Body, Derma-Smoothe/FS Scalp 0.01    Triamcinolone acetonide Cream, lotion Kenalog , Aristocort  0.025   Least potent  (group 7) Hydrocortisone (base, ?2%) Cream, ointment Hytone, Nutracort  2.5     Lotion Hytone, Ala Scalp, Scalacort 2     Solution Texacort 2.5    Hydrocortisone (base, <2%) Ointment Cortaid, Cortizone 10, Hytone, Nutracort 1     Cream Cortaid , Cortizone 10, Hytone, Synacort 1     Gel Cortizone 10 1     Lotion Aquanil HC, Sarnol-HC, Cortizone 10 1     Spray Cortaid 1     Solution Cortaid, Noble, Scalp Relief 1     Cream, ointment Cortaid 0.5    Hydrocortisone acetate Cream MiCort-HC 2.5     Lotion Nucort 2       Encouraged to take vitamin D supplement regularly. Provided with documentation of her visit today for her insurance.     Total time spent 40 minutes.  More than 50% of the time spent with Ms. Vazquez on counseling / coordinating her care.     Daly BISHOP, " CNP       Answers for HPI/ROS submitted by the patient on 4/7/2021   General Symptoms: No  Skin Symptoms: Yes  HENT Symptoms: No  EYE SYMPTOMS: No  HEART SYMPTOMS: Yes  LUNG SYMPTOMS: No  INTESTINAL SYMPTOMS: No  URINARY SYMPTOMS: No  GYNECOLOGIC SYMPTOMS: No  BREAST SYMPTOMS: No  SKELETAL SYMPTOMS: No  BLOOD SYMPTOMS: No  NERVOUS SYSTEM SYMPTOMS: No  MENTAL HEALTH SYMPTOMS: No  Changes in hair: No  Changes in moles/birth marks: No  Itching: No  Rashes: Yes  Changes in nails: No  Acne: No  Hair in places you don't want it: No  Change in facial hair: No  Warts: No  Non-healing sores: No  Scarring: No  Flaking of skin: Yes  Color changes of hands/feet in cold : No  Sun sensitivity: No  Skin thickening: No  Chest pain or pressure: No  Fast or irregular heartbeat: No  Pain in legs with walking: No  Trouble breathing while lying down: No  Fingers or toes appear blue: No  High blood pressure: No  Low blood pressure: No  Fainting: No  Murmurs: No  Pacemaker: No  Varicose veins: No  Edema or swelling: No  Wake up at night with shortness of breath: No  Light-headedness: Yes  Exercise intolerance: No    Total time spent today with this patient including chart review, exam time with patient and documentation :

## 2021-04-08 NOTE — NURSING NOTE
Chief Complaint   Patient presents with     Physical     pt here for physical       Mahamed Trujillo CMA, EMT at 7:53 AM on 4/8/2021.

## 2021-09-20 ENCOUNTER — ANCILLARY PROCEDURE (OUTPATIENT)
Dept: MAMMOGRAPHY | Facility: CLINIC | Age: 61
End: 2021-09-20
Attending: NURSE PRACTITIONER
Payer: COMMERCIAL

## 2021-09-20 DIAGNOSIS — Z12.31 VISIT FOR SCREENING MAMMOGRAM: ICD-10-CM

## 2021-09-20 PROCEDURE — 77067 SCR MAMMO BI INCL CAD: CPT

## 2021-09-20 PROCEDURE — 77067 SCR MAMMO BI INCL CAD: CPT | Mod: 26 | Performed by: RADIOLOGY

## 2021-10-23 ENCOUNTER — HEALTH MAINTENANCE LETTER (OUTPATIENT)
Age: 61
End: 2021-10-23

## 2022-04-18 ASSESSMENT — ENCOUNTER SYMPTOMS
DOUBLE VISION: 0
EYE WATERING: 0
EYE REDNESS: 1
EYE IRRITATION: 0
EYE PAIN: 0

## 2022-04-21 ENCOUNTER — OFFICE VISIT (OUTPATIENT)
Dept: INTERNAL MEDICINE | Facility: CLINIC | Age: 62
End: 2022-04-21
Payer: COMMERCIAL

## 2022-04-21 VITALS
OXYGEN SATURATION: 98 % | HEIGHT: 65 IN | DIASTOLIC BLOOD PRESSURE: 66 MMHG | RESPIRATION RATE: 16 BRPM | HEART RATE: 81 BPM | BODY MASS INDEX: 25.83 KG/M2 | SYSTOLIC BLOOD PRESSURE: 102 MMHG | WEIGHT: 155 LBS

## 2022-04-21 DIAGNOSIS — Z13.1 SCREENING FOR DIABETES MELLITUS: ICD-10-CM

## 2022-04-21 DIAGNOSIS — Z12.4 SCREENING FOR CERVICAL CANCER: Primary | ICD-10-CM

## 2022-04-21 DIAGNOSIS — E78.00 ELEVATED LDL CHOLESTEROL LEVEL: ICD-10-CM

## 2022-04-21 DIAGNOSIS — Z13.0 SCREENING FOR DEFICIENCY ANEMIA: ICD-10-CM

## 2022-04-21 DIAGNOSIS — E55.9 HYPOVITAMINOSIS D: ICD-10-CM

## 2022-04-21 PROCEDURE — 99396 PREV VISIT EST AGE 40-64: CPT | Performed by: NURSE PRACTITIONER

## 2022-04-21 PROCEDURE — G0145 SCR C/V CYTO,THINLAYER,RESCR: HCPCS | Performed by: NURSE PRACTITIONER

## 2022-04-21 PROCEDURE — 87624 HPV HI-RISK TYP POOLED RSLT: CPT | Performed by: NURSE PRACTITIONER

## 2022-04-21 NOTE — PATIENT INSTRUCTIONS
To schedule an lab appointment at the [facility], please call [phone].

## 2022-04-21 NOTE — PROGRESS NOTES
"S: Barb Vazquez is a 61 year old female           Patient Active Problem List   Diagnosis     CARDIOVASCULAR SCREENING; LDL GOAL LESS THAN 160     Blepharospasm     Keloid of skin     Osteopenia     Vitamin D deficiency     Colon adenoma, tubular adenoma          No past medical history on file.       No past surgical history on file.         Social History     Tobacco Use     Smoking status: Never Smoker     Smokeless tobacco: Never Used   Substance Use Topics     Alcohol use: Yes     Comment: very occassional            Family History   Problem Relation Age of Onset     Glaucoma Father      Lipids Other      Neurologic Disorder Mother         pituitary tumor cancer/seizures     Memory loss Mother                Allergies   Allergen Reactions     Asa [Aspirin] Rash     Sulfa Drugs Rash     Social History:   The patient was alone.   Smoking Status: Never smoker    Smokeless Tobacco: Never used   Alcohol Use: Yes   Marital Status:   Employment status: School nurse, plans to retire at age 63  Home: Lives in her own home in  [place].  Three grown children, one lives in [facility].  Enjoys exercise and occasional running       No current outpatient medications on file.       REVIEW OF SYSTEMS:  See above.    O:   /66 (BP Location: Right arm, Patient Position: Sitting, Cuff Size: Adult Regular)   Pulse 81   Resp 16   Ht 1.638 m (5' 4.5\")   Wt 70.3 kg (155 lb)   SpO2 98%   Breastfeeding No   BMI 26.19 kg/m      Constitutional: no distress, comfortable, pleasant   Eyes: anicteric, conjunctiva pink, she has contact lenses.  Neck: supple with full range of motion, no thyromegaly.   BREAST: normal without masses, tenderness or nipple discharge and no palpable axillary masses or adenopathy  Cardiovascular: regular rate and rhythm, normal S1 and S2, no murmurs, rubs or gallops, peripheral pulses full and symmetric   Respiratory: clear to auscultation, no wheezes or crackles, normal breath sounds "   Gastrointestinal: positive bowel sounds, nontender, no hepatosplenomegaly, no masses   Musculoskeletal: full range of motion, no edema   Skin: No rashes..   Neurological: normal gait, normal speech, no tremor. A and O x 3, good historian.  Psychological: appropriate mood, good eye contact, normal affect   Lymph: no axillary, cervical,  supraclavicular, infraclavicular or inguinal nodes.  Pelvic exam: normal vagina and vulva, normal cervix without lesions or tenderness, uterus normal size anteverted, adenxa normal in size without tenderness, pap smear done.  Rectal she has a large hemorrhoid, non-inflammed.   A/P:  Barb was seen today for physical and annual visit.    Diagnoses and all orders for this visit:    Screening for cervical cancer  -     Pap screen with HPV - recommended age 30 - 65 years    Elevated LDL cholesterol level  -     Lipid panel reflex to direct LDL Fasting; Future    Screening for diabetes mellitus  -     Basic metabolic panel; Future    Screening for deficiency anemia  -     Hemoglobin; Future    Hypovitaminosis D  -     Vitamin D Deficiency; Future    Eczema of right eyelid        -     Adult Dermatology Referral; Future    HCM       -     Due for mammo in 9/2022              Due for colonoscopy in 2025.    The patient voiced understanding of the information discussed and all questions were answered.     Total time spent today with this patient including chart review, exam time with patient and documentation :       Daly BISHOP CNP                  Answers for HPI/ROS submitted by the patient on 4/18/2022  General Symptoms: No  Skin Symptoms: No  HENT Symptoms: No  EYE SYMPTOMS: Yes  HEART SYMPTOMS: No  LUNG SYMPTOMS: No  INTESTINAL SYMPTOMS: No  URINARY SYMPTOMS: No  GYNECOLOGIC SYMPTOMS: No  BREAST SYMPTOMS: No  SKELETAL SYMPTOMS: No  BLOOD SYMPTOMS: No  NERVOUS SYSTEM SYMPTOMS: No  MENTAL HEALTH SYMPTOMS: No  Eye pain: No  Vision loss: No  Dry eyes: No  Watery eyes: No  Eye  bulging: No  Double vision: No  Flashing of lights: No  Spots: No  Floaters: Yes  Redness: Yes  Crossed eyes: No  Tunnel Vision: No  Yellowing of eyes: No  Eye irritation: No

## 2022-04-21 NOTE — NURSING NOTE
"Barb Vazquez is a 61 year old female patient that presents today in clinic for the following:    Chief Complaint   Patient presents with     Physical     Annual Visit     The patient's allergies and medications were reviewed as noted. A set of vitals were recorded as noted without incident: /66 (BP Location: Right arm, Patient Position: Sitting, Cuff Size: Adult Regular)   Pulse 81   Resp 16   Ht 1.638 m (5' 4.5\")   Wt 70.3 kg (155 lb)   SpO2 98%   Breastfeeding No   BMI 26.19 kg/m  . The patient does not have any other questions for the provider.    Ben Gallego, EMT at 4:17 PM on 4/21/2022  "

## 2022-04-23 ENCOUNTER — LAB (OUTPATIENT)
Dept: LAB | Facility: CLINIC | Age: 62
End: 2022-04-23
Payer: COMMERCIAL

## 2022-04-23 DIAGNOSIS — E55.9 HYPOVITAMINOSIS D: ICD-10-CM

## 2022-04-23 DIAGNOSIS — Z13.0 SCREENING FOR DEFICIENCY ANEMIA: ICD-10-CM

## 2022-04-23 DIAGNOSIS — Z13.1 SCREENING FOR DIABETES MELLITUS: ICD-10-CM

## 2022-04-23 DIAGNOSIS — E78.00 ELEVATED LDL CHOLESTEROL LEVEL: ICD-10-CM

## 2022-04-23 LAB
ANION GAP SERPL CALCULATED.3IONS-SCNC: 6 MMOL/L (ref 3–14)
BUN SERPL-MCNC: 23 MG/DL (ref 7–30)
CALCIUM SERPL-MCNC: 9.3 MG/DL (ref 8.5–10.1)
CHLORIDE BLD-SCNC: 107 MMOL/L (ref 94–109)
CHOLEST SERPL-MCNC: 208 MG/DL
CO2 SERPL-SCNC: 28 MMOL/L (ref 20–32)
CREAT SERPL-MCNC: 1 MG/DL (ref 0.52–1.04)
FASTING STATUS PATIENT QL REPORTED: YES
GFR SERPL CREATININE-BSD FRML MDRD: 63 ML/MIN/1.73M2
GLUCOSE BLD-MCNC: 95 MG/DL (ref 70–99)
HDLC SERPL-MCNC: 64 MG/DL
HGB BLD-MCNC: 13.7 G/DL (ref 11.7–15.7)
LDLC SERPL CALC-MCNC: 126 MG/DL
NONHDLC SERPL-MCNC: 144 MG/DL
POTASSIUM BLD-SCNC: 4.1 MMOL/L (ref 3.4–5.3)
SODIUM SERPL-SCNC: 141 MMOL/L (ref 133–144)
TRIGL SERPL-MCNC: 88 MG/DL

## 2022-04-23 PROCEDURE — 80061 LIPID PANEL: CPT | Performed by: PATHOLOGY

## 2022-04-23 PROCEDURE — 85018 HEMOGLOBIN: CPT | Performed by: PATHOLOGY

## 2022-04-23 PROCEDURE — 80048 BASIC METABOLIC PNL TOTAL CA: CPT | Performed by: PATHOLOGY

## 2022-04-23 PROCEDURE — 82306 VITAMIN D 25 HYDROXY: CPT | Performed by: NURSE PRACTITIONER

## 2022-04-23 PROCEDURE — 36415 COLL VENOUS BLD VENIPUNCTURE: CPT | Performed by: PATHOLOGY

## 2022-04-25 DIAGNOSIS — E55.9 VITAMIN D DEFICIENCY: Primary | ICD-10-CM

## 2022-04-25 LAB
BKR LAB AP GYN ADEQUACY: NORMAL
BKR LAB AP GYN INTERPRETATION: NORMAL
BKR LAB AP HPV REFLEX: NORMAL
BKR LAB AP PREVIOUS ABNORMAL: NORMAL
DEPRECATED CALCIDIOL+CALCIFEROL SERPL-MC: 14 UG/L (ref 20–75)
PATH REPORT.COMMENTS IMP SPEC: NORMAL
PATH REPORT.COMMENTS IMP SPEC: NORMAL
PATH REPORT.RELEVANT HX SPEC: NORMAL

## 2022-04-27 LAB
HUMAN PAPILLOMA VIRUS 16 DNA: NEGATIVE
HUMAN PAPILLOMA VIRUS 18 DNA: NEGATIVE
HUMAN PAPILLOMA VIRUS FINAL DIAGNOSIS: NORMAL
HUMAN PAPILLOMA VIRUS OTHER HR: NEGATIVE

## 2022-05-19 ENCOUNTER — MYC MEDICAL ADVICE (OUTPATIENT)
Dept: INTERNAL MEDICINE | Facility: CLINIC | Age: 62
End: 2022-05-19
Payer: COMMERCIAL

## 2022-06-25 ENCOUNTER — LAB (OUTPATIENT)
Dept: LAB | Facility: CLINIC | Age: 62
End: 2022-06-25
Payer: COMMERCIAL

## 2022-06-25 DIAGNOSIS — E55.9 VITAMIN D DEFICIENCY: ICD-10-CM

## 2022-06-25 PROCEDURE — 36415 COLL VENOUS BLD VENIPUNCTURE: CPT | Performed by: PATHOLOGY

## 2022-06-25 PROCEDURE — 82306 VITAMIN D 25 HYDROXY: CPT | Performed by: NURSE PRACTITIONER

## 2022-06-27 LAB — DEPRECATED CALCIDIOL+CALCIFEROL SERPL-MC: 48 UG/L (ref 20–75)

## 2022-08-18 ENCOUNTER — OFFICE VISIT (OUTPATIENT)
Dept: DERMATOLOGY | Facility: CLINIC | Age: 62
End: 2022-08-18
Attending: NURSE PRACTITIONER
Payer: COMMERCIAL

## 2022-08-18 DIAGNOSIS — H01.119 CONTACT DERMATITIS OF EYELID, UNSPECIFIED LATERALITY: Primary | ICD-10-CM

## 2022-08-18 PROCEDURE — 99214 OFFICE O/P EST MOD 30 MIN: CPT | Mod: GC

## 2022-08-18 RX ORDER — BENZOCAINE/MENTHOL 6 MG-10 MG
LOZENGE MUCOUS MEMBRANE 2 TIMES DAILY
COMMUNITY
End: 2023-04-27

## 2022-08-18 RX ORDER — HYDROCORTISONE 25 MG/G
OINTMENT TOPICAL 2 TIMES DAILY
Qty: 30 G | Refills: 0 | Status: SHIPPED | OUTPATIENT
Start: 2022-08-18 | End: 2023-04-27

## 2022-08-18 RX ORDER — TACROLIMUS 1 MG/G
OINTMENT TOPICAL 2 TIMES DAILY
Qty: 30 G | Refills: 11 | Status: SHIPPED | OUTPATIENT
Start: 2022-08-18 | End: 2023-07-07

## 2022-08-18 ASSESSMENT — PAIN SCALES - GENERAL: PAINLEVEL: NO PAIN (0)

## 2022-08-18 NOTE — NURSING NOTE
"Chief Complaint   Patient presents with     Derm Problem     Here regarding \"itching eyelids\", has noticed this for 1.5 years     Gretel MONGE, EMT  Dermatology/Dermatology Surgery  [phone]      "

## 2022-08-18 NOTE — LETTER
"8/18/2022       RE: Barb Vazquez  [address]  [facility] [address]     Dear Colleague,    Thank you for referring your patient, Barb Vazquez, to the [facility] DERMATOLOGY CLINIC [place] at [facility]. Please see a copy of my visit note below.    [facility] Dermatology Note  Encounter Date: Aug 18, 2022  Office Visit     Dermatology Problem List:  # Eyelid dermatitis: ICD > ACD   - protopic, HC 2.5% ointment   - future: patch testing     ____________________________________________    Assessment & Plan:     Contact dermatitis of eyelid, bilateral. 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 Treat as ICD, avoid irritants if not improved consider patch testing referral.   - Hydrocortisone 2.5 % ointment; Apply topically 2 times daily For one week then stop.  Dispense: 30 g; Refill: 0   - Tacrolimus (PROTOPIC) 0.1 % external ointment; Apply topically 2 times daily As needed for rash on the eyelids. Can mix in vaseline if it feels too irritating.  Dispense: 30 g; Refill: 11  - Vaseline for moisturizing of eyelids   - Future: patch testing       Procedures Performed:   None    Follow-up: 2 months    Staff and Resident:     Helen Larson MD     The patient was seen and staffed with Dr. Radhika MD   I have personally examined this patient and agree with the resident doctor's documentation and plan of care. I have reviewed and amended the resident's note above. The documentation accurately reflects my clinical observations, diagnoses, treatment and follow-up plans.     Chioma Garrido MD  Dermatology Staff    ____________________________________________    CC: Derm Problem (Here regarding \"itching eyelids\", has noticed this for 1.5 years)    HPI:  Ms. Barb Vazquez is a(n) 61 year old female who presents today as a new patient for eyelid rash. The " rash x 1.5 years with no specified trigger other than it being very cold outside that day. Waxes and wanes but has never resolved. Symptoms include itchy, stingy, flaky. Initially she stopped lotions, soap, etc. Didn't do much. Then talked to ophthalmologist who had her use 1% HC cream BID x2 weeks, every day x1 week, then just intermittently. It helped somewhat but now using 1-2 times per week and it continues. Today her rash is about a 3/10. Tries not to use soap near it but when she does uses Basis soap for face wash. For the rest of the face she uses Neutrogena moisturizer but avoids her eyelids. On eyelids only using vanicream. 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 She does not often get manicures and has no odd hobbies. For hands uses Lubriderm moisturizer at home.     She does not have history of eczema, had a rash on her abdomen around her wedding time many years ago thought related to stress and resolved without treatment. The rash on her eyelids is not photosensitive and actually improved last week when she was in [place]. Denies history of any other skin related problems.     Denies any other new, changing, bleeding, or nonhealing lesions.      Labs Reviewed:  N/A    Physical Exam:  Vitals: There were no vitals taken for this visit.  SKIN: Focused examination of eyelids was performed.  - slight faint pink patches on the bilateral eyelids without flaking or scale today.   - No other lesions of concern on areas examined.     Medications:  Current Outpatient Medications   Medication     hydrocortisone (CORTAID) 1 % external cream     vitamin D3 (CHOLECALCIFEROL) 1.25 MG (12941 UT) capsule     No current facility-administered medications for this visit.      Past Medical History:   Patient Active Problem List   Diagnosis     CARDIOVASCULAR SCREENING; LDL GOAL LESS THAN 160     Blepharospasm     Keloid of skin     Osteopenia     Vitamin D deficiency      Colon adenoma, tubular adenoma     History reviewed. No pertinent past medical history.    CC Daly Euceda, APRN CNP  [address] [facility]  [place] [address] on close of this encounter.

## 2022-08-18 NOTE — PROGRESS NOTES
"[facility] Dermatology Note  Encounter Date: Aug 18, 2022  Office Visit     Dermatology Problem List:  # Eyelid dermatitis: ICD > ACD   - protopic, HC 2.5% ointment   - future: patch testing     ____________________________________________    Assessment & Plan:     Contact dermatitis of eyelid, bilateral. 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 Treat as ICD, avoid irritants if not improved consider patch testing referral.   - Hydrocortisone 2.5 % ointment; Apply topically 2 times daily For one week then stop.  Dispense: 30 g; Refill: 0   - Tacrolimus (PROTOPIC) 0.1 % external ointment; Apply topically 2 times daily As needed for rash on the eyelids. Can mix in vaseline if it feels too irritating.  Dispense: 30 g; Refill: 11  - Vaseline for moisturizing of eyelids   - Future: patch testing       Procedures Performed:   None    Follow-up: 2 months    Staff and Resident:     Helen Larson MD     The patient was seen and staffed with Dr. Radhika MD   I have personally examined this patient and agree with the resident doctor's documentation and plan of care. I have reviewed and amended the resident's note above. The documentation accurately reflects my clinical observations, diagnoses, treatment and follow-up plans.     Chioma Garrido MD  Dermatology Staff    ____________________________________________    CC: Derm Problem (Here regarding \"itching eyelids\", has noticed this for 1.5 years)    HPI:  Ms. Barb Vazquez is a(n) 61 year old female who presents today as a new patient for eyelid rash. The rash x 1.5 years with no specified trigger other than it being very cold outside that day. Waxes and wanes but has never resolved. Symptoms include itchy, stingy, flaky. Initially she stopped lotions, soap, etc. Didn't do much. Then talked to ophthalmologist who had her use 1% HC cream BID x2 weeks, every day x1 week, then " just intermittently. It helped somewhat but now using 1-2 times per week and it continues. Today her rash is about a 3/10. Tries not to use soap near it but when she does uses Basis soap for face wash. For the rest of the face she uses Neutrogena moisturizer but avoids her eyelids. On eyelids only using vanicream. 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 She does not often get manicures and has no odd hobbies. For hands uses Lubriderm moisturizer at home.     She does not have history of eczema, had a rash on her abdomen around her wedding time many years ago thought related to stress and resolved without treatment. The rash on her eyelids is not photosensitive and actually improved last week when she was in [place]. Denies history of any other skin related problems.     Denies any other new, changing, bleeding, or nonhealing lesions.      Labs Reviewed:  N/A    Physical Exam:  Vitals: There were no vitals taken for this visit.  SKIN: Focused examination of eyelids was performed.  - slight faint pink patches on the bilateral eyelids without flaking or scale today.   - No other lesions of concern on areas examined.     Medications:  Current Outpatient Medications   Medication     hydrocortisone (CORTAID) 1 % external cream     vitamin D3 (CHOLECALCIFEROL) 1.25 MG (07578 UT) capsule     No current facility-administered medications for this visit.      Past Medical History:   Patient Active Problem List   Diagnosis     CARDIOVASCULAR SCREENING; LDL GOAL LESS THAN 160     Blepharospasm     Keloid of skin     Osteopenia     Vitamin D deficiency     Colon adenoma, tubular adenoma     History reviewed. No pertinent past medical history.    CC DARIO Carroll CNP  [address]  [place] [address] on close of this encounter.

## 2022-08-18 NOTE — PATIENT INSTRUCTIONS
Suspect this is irritant contact dermatitis vs allergic contact dermatitis.     Start hydrocortisone 2.5% ointment twice daily for one week.   Then start protopic ointment twice daily as needed (this is steroid sparing and safe to use as often as needed). You can mix protopic with vaseline if needed if it is stinging or irritating.   Use Vaseline as a moisturizer for your eyelids.     Try to avoid wet wipes or soaps at work if possible, use gloves and try to bring your own products.     If we do not see improvement in 2 months we will have you go to Dr. Beebe for patch testing for possible allergies.

## 2022-08-19 ENCOUNTER — TELEPHONE (OUTPATIENT)
Dept: DERMATOLOGY | Facility: CLINIC | Age: 62
End: 2022-08-19

## 2022-08-19 NOTE — TELEPHONE ENCOUNTER
Prior Authorization Retail Medication Request    Medication/Dose: tacrolimus (PROTOPIC) 0.1 % external ointment   ICD code (if different than what is on RX):  Contact dermatitis of eyelid, unspecified laterality [H01.119]  Previously Tried and Failed:    Rationale:      Insurance Name:  [facility]  Insurance ID:  [id number]

## 2022-08-22 NOTE — TELEPHONE ENCOUNTER
Central Prior Authorization Team   Phone: [phone]    PA Initiation    Medication: tacrolimus (PROTOPIC) 0.1 % external ointment   Insurance Company: [employer] ([facility]) - Phone [phone] Fax [phone]  Pharmacy Filling the Rx: [facility] - [place], [place] - [address]  Filling Pharmacy Phone: [phone]  Filling Pharmacy Fax:    Start Date: 8/22/2022

## 2022-08-24 NOTE — TELEPHONE ENCOUNTER
Prior Authorization Approval    Authorization Effective Date:    Authorization Expiration Date:    Medication: tacrolimus (PROTOPIC) 0.1 % external ointment   Approved Dose/Quantity:   Reference #:     Insurance Company: [employer] ([facility]) - Phone [phone] Fax [phone]  Expected CoPay:       CoPay Card Available:      Foundation Assistance Needed:    Which Pharmacy is filling the prescription (Not needed for infusion/clinic administered): [facility] - [place], [place] - [address]  Pharmacy Notified: Yes  Patient Notified: Yes

## 2022-09-26 ENCOUNTER — ANCILLARY PROCEDURE (OUTPATIENT)
Dept: MAMMOGRAPHY | Facility: CLINIC | Age: 62
End: 2022-09-26
Attending: NURSE PRACTITIONER
Payer: COMMERCIAL

## 2022-09-26 DIAGNOSIS — Z12.31 VISIT FOR SCREENING MAMMOGRAM: ICD-10-CM

## 2022-09-26 PROCEDURE — 77067 SCR MAMMO BI INCL CAD: CPT

## 2022-09-26 PROCEDURE — 77067 SCR MAMMO BI INCL CAD: CPT | Mod: 26

## 2022-10-09 ENCOUNTER — HEALTH MAINTENANCE LETTER (OUTPATIENT)
Age: 62
End: 2022-10-09

## 2022-11-03 ENCOUNTER — OFFICE VISIT (OUTPATIENT)
Dept: DERMATOLOGY | Facility: CLINIC | Age: 62
End: 2022-11-03
Payer: COMMERCIAL

## 2022-11-03 DIAGNOSIS — H01.119 CONTACT DERMATITIS OF EYELID, UNSPECIFIED LATERALITY: Primary | ICD-10-CM

## 2022-11-03 PROCEDURE — 99214 OFFICE O/P EST MOD 30 MIN: CPT | Mod: GC | Performed by: STUDENT IN AN ORGANIZED HEALTH CARE EDUCATION/TRAINING PROGRAM

## 2022-11-03 RX ORDER — PIMECROLIMUS 10 MG/G
CREAM TOPICAL 2 TIMES DAILY
Qty: 30 G | Refills: 1 | Status: SHIPPED | OUTPATIENT
Start: 2022-11-03 | End: 2023-04-30

## 2022-11-03 NOTE — LETTER
"11/3/2022       RE: Barb Vazquez  [address]  [facility] [address]     Dear Colleague,    Thank you for referring your patient, Barb Vazquez, to the [facility] DERMATOLOGY CLINIC [place] at [facility]. Please see a copy of my visit note below.    [facility] Dermatology Note  Encounter Date: Nov 3, 2022  Office Visit     Dermatology Problem List:  # Eyelid dermatitis: ICD > ACD   - protopic, elidel, HC 2.5% ointment   - future: patch testing     ____________________________________________    Assessment & Plan:     Contact dermatitis of eyelid, bilateral. 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 Difficulty with compliance with protopic b/c it is too greasy so will switch to elidel. Also instructed that she can use hydrocortisone ointment 2.5% intermittently PRN for up to 5-7 days in a row if she flares b/c she feels this was more helpful. Will send for patch testing as well.   - Hydrocortisone 2.5 % ointment; Apply topically 2 times daily For one week then stop.  Dispense: 30 g; Refill: 0   - pimecrolimus (ELIDEL) 1 % external cream; Apply topically 2 times daily  Dispense: 30 g; Refill: 1   - Vaseline for moisturizing of eyelids   - Referral to Dr. Beebe for patch testing     Procedures Performed:   None    Follow-up: 4 months (after patch testing)     Staff and Resident:     Helen Larson MD     The patient was seen and staffed with Dr. MAY Hamm MD       ____________________________________________    CC: Derm Problem (Barb is here today following up on eczema of upper eyelids. Reports symptoms are \"a little better\")    HPI:  Ms. Barb Vazquez is a(n) 61 year old female who presents today as a return patient for eyelid rash. We saw her 2 months ago at that time she was thought to have ICD vs ACD and we tried HC 2.5% ointment x1 " week and then switched to protopic. Patient states today eyelid rash is mild-moderately improved but still persists. She only uses protopic at night b/c she finds it too greasy. She feels the HC 2.5% ointment was very helpful but only used it during the one week she was told to.     Denies any other new, changing, bleeding, or nonhealing lesions.      Labs Reviewed:  N/A    Physical Exam:  Vitals: There were no vitals taken for this visit.  SKIN: Focused examination of eyelids was performed.  - slight faint pink patches on the bilateral eyelids without flaking or scale  - No other lesions of concern on areas examined.     Medications:  Current Outpatient Medications   Medication     hydrocortisone (CORTAID) 1 % external cream     hydrocortisone 2.5 % ointment     tacrolimus (PROTOPIC) 0.1 % external ointment     vitamin D3 (CHOLECALCIFEROL) 1.25 MG (23697 UT) capsule     No current facility-administered medications for this visit.      Past Medical History:   Patient Active Problem List   Diagnosis     CARDIOVASCULAR SCREENING; LDL GOAL LESS THAN 160     Blepharospasm     Keloid of skin     Osteopenia     Vitamin D deficiency     Colon adenoma, tubular adenoma     No past medical history on file.    CC DARIO Carroll CNP  [address] [facility] [address]  [place] [phone] on close of this encounter.    Attestation signed by Wei Hamm MD at 11/3/2022 11:58 AM:  I have personally examined this patient and agree with the resident doctor's documentation and plan of care. I have reviewed and amended the resident's note above. The documentation accurately reflects my clinical observations, diagnoses, treatment and follow-up plans.     Wei Hamm MD  Dermatology Staff

## 2022-11-03 NOTE — NURSING NOTE
"Dermatology Rooming Note    Barb Vazquez's goals for this visit include:   Chief Complaint   Patient presents with     Derm Problem     Barb is here today following up on eczema of upper eyelids. Reports symptoms are \"a little better\"           "

## 2022-11-03 NOTE — PROGRESS NOTES
"[facility] Dermatology Note  Encounter Date: Nov 3, 2022  Office Visit     Dermatology Problem List:  # Eyelid dermatitis: ICD > ACD   - protopic, elidel, HC 2.5% ointment   - future: patch testing     ____________________________________________    Assessment & Plan:     Contact dermatitis of eyelid, bilateral. 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 Difficulty with compliance with protopic b/c it is too greasy so will switch to elidel. Also instructed that she can use hydrocortisone ointment 2.5% intermittently PRN for up to 5-7 days in a row if she flares b/c she feels this was more helpful. Will send for patch testing as well.   - Hydrocortisone 2.5 % ointment; Apply topically 2 times daily For one week then stop.  Dispense: 30 g; Refill: 0   - pimecrolimus (ELIDEL) 1 % external cream; Apply topically 2 times daily  Dispense: 30 g; Refill: 1   - Vaseline for moisturizing of eyelids   - Referral to Dr. Beebe for patch testing     Procedures Performed:   None    Follow-up: 4 months (after patch testing)     Staff and Resident:     Helen Larson MD     The patient was seen and staffed with Dr. MAY Hamm MD       ____________________________________________    CC: Derm Problem (Barb is here today following up on eczema of upper eyelids. Reports symptoms are \"a little better\")    HPI:  Ms. Barb Vazquez is a(n) 61 year old female who presents today as a return patient for eyelid rash. We saw her 2 months ago at that time she was thought to have ICD vs ACD and we tried HC 2.5% ointment x1 week and then switched to protopic. Patient states today eyelid rash is mild-moderately improved but still persists. She only uses protopic at night b/c she finds it too greasy. She feels the HC 2.5% ointment was very helpful but only used it during the one week she was told to.     Denies any other new, changing, bleeding, " or nonhealing lesions.      Labs Reviewed:  N/A    Physical Exam:  Vitals: There were no vitals taken for this visit.  SKIN: Focused examination of eyelids was performed.  - slight faint pink patches on the bilateral eyelids without flaking or scale  - No other lesions of concern on areas examined.     Medications:  Current Outpatient Medications   Medication     hydrocortisone (CORTAID) 1 % external cream     hydrocortisone 2.5 % ointment     tacrolimus (PROTOPIC) 0.1 % external ointment     vitamin D3 (CHOLECALCIFEROL) 1.25 MG (16029 UT) capsule     No current facility-administered medications for this visit.      Past Medical History:   Patient Active Problem List   Diagnosis     CARDIOVASCULAR SCREENING; LDL GOAL LESS THAN 160     Blepharospasm     Keloid of skin     Osteopenia     Vitamin D deficiency     Colon adenoma, tubular adenoma     No past medical history on file.    CC DARIO Carroll CNP  [address] [facility] [address]  [place] [address] on close of this encounter.

## 2022-11-03 NOTE — PATIENT INSTRUCTIONS
1. Contact dermatitis of eyelid, unspecified laterality  - Adult Dermatology Referral (for allergy patch testing with Dr. Beebe); Future  - pimecrolimus (ELIDEL) 1 % external cream; Apply topically 2 times daily  Dispense: 30 g; Refill: 1     You can use hydrocortisone up to twice daily as needed for several days at a time but make sure that you take a break.

## 2022-11-16 ENCOUNTER — TELEPHONE (OUTPATIENT)
Dept: DERMATOLOGY | Facility: CLINIC | Age: 62
End: 2022-11-16

## 2022-11-16 NOTE — TELEPHONE ENCOUNTER
Prior Authorization Retail Medication Request    Medication/Dose: pimecrolimus (ELIDEL) 1 % external cream  ICD code (if different than what is on RX):  Contact dermatitis of eyelid, unspecified laterality [H01.119]    Previously Tried and Failed:  See Chart  Rationale:      Insurance Name:  [employer]  Insurance ID:  [id number]

## 2022-11-16 NOTE — TELEPHONE ENCOUNTER
PA Initiation    Medication: pimecrolimus (ELIDEL) 1 % external cream   Insurance Company: [employer] ([facility]) - Phone [phone] Fax [phone]  Pharmacy Filling the Rx: [facility] - [place], [place] - [address]  Filling Pharmacy Phone: [phone]  Filling Pharmacy Fax: [phone]  Start Date: 11/16/2022

## 2022-11-18 ENCOUNTER — TELEPHONE (OUTPATIENT)
Dept: DERMATOLOGY | Facility: CLINIC | Age: 62
End: 2022-11-18

## 2022-11-18 NOTE — TELEPHONE ENCOUNTER
[facility] Call Center    Phone Message    May a detailed message be left on voicemail: yes     Reason for Call: Medication Question or concern regarding medication   Prescription Clarification  Name of Medication: pimecrolimus (ELIDEL) 1 % external cream  Prescribing Provider: Dr. Garrido    Pharmacy: [facility] - [place], [place] - [address]   What on the order needs clarification? Pharmacy will need to know where to apply Rx (what part of the body) and day of supply. Please call Pharmacy to discuss. Thanks           Action Taken: Message routed to:  [facility] Derm    Travel Screening: Not Applicable

## 2022-11-21 NOTE — TELEPHONE ENCOUNTER
Prior Authorization Approval    Authorization Effective Date: 11/16/2022  Authorization Expiration Date: 11/16/2023  Medication: pimecrolimus (ELIDEL) 1 % external cream--APPROVED  Approved Dose/Quantity:   Reference #:     Insurance Company: [employer] ([facility]) - Phone [phone] Fax [phone]  Expected CoPay:       CoPay Card Available:      Foundation Assistance Needed:    Which Pharmacy is filling the prescription (Not needed for infusion/clinic administered): [facility] - [place], [place] - [address]  Pharmacy Notified: Yes  Patient Notified: Yes **Instructed pharmacy to notify patient when script is ready to /ship.**

## 2022-11-25 NOTE — TELEPHONE ENCOUNTER
Called pharmacy and informed them medication was to be used on eyelids. Pharmacy acknowledged and states that they also need PA. Informed pharmacy that PA for pimecrolimus cream was approved. Pharmacy acknowledged and had no questions or concerns. States they will reach out to patient to fill medication.    Dyllan Cole, EMT

## 2022-11-25 NOTE — TELEPHONE ENCOUNTER
Helen Larson MD Crawford, Aaliyah, CMA 6 days ago     SB  Eyelids plese       Nichole Mckinney CMA Blumenthal, Shoshana, MD 7 days ago     AC  Is she using this on her hands or eyelids?

## 2023-01-17 NOTE — TELEPHONE ENCOUNTER
FUTURE VISIT INFORMATION      FUTURE VISIT INFORMATION:    Date: 4.14.23    Time: 7:00    Location: [facility]  REFERRAL INFORMATION:    Referring provider:  Radhika    Referring providers clinic:  Derm    Reason for visit/diagnosis  Patch test consult - Contact dermatitis of eyelid, unspecified laterality, Recs in Epic, per pt Barb    RECORDS REQUESTED FROM:       Clinic name Comments Records Status Imaging Status   Derm 11.3.22, 8.18.22  [facility] Epic

## 2023-04-12 NOTE — PROGRESS NOTES
[facility] Dermato-allergology Note  Office visit  Encounter Date: Apr 14, 2023  ____________________________________________    CC: No chief complaint on file.      HPI:  (Apr 14, 2023)  Ms. Barb Vazquez is a(n) 62 year old female who presents today as new patient for allergy consultation  - since about 1.5 years patient had recurrent dermatitis of the eyelids --> Tacrolimus ointment helped, but not totally. As soon as she stops it it comes back  - alcohol seems to induce on these locations a flush like reaction  - dies hair every 5 weeks.  - wears contact lenses and uses solutions for contact lenses  - otherwise feeling well in usual state of health    Physical exam:  General: In no acute distress, well-developed, well-nourished  Eyes: no conjunctivitis  ENT: no signs of rhinitis   Pulmonary: no wheezing or coughing  Skin: Focused examination of the skin on test sites was performed = see test results below  Periorbital hyperpigmentations, now on Tacrolimus no desquamation    Past Medical History:   Patient Active Problem List   Diagnosis     CARDIOVASCULAR SCREENING; LDL GOAL LESS THAN 160     Blepharospasm     Keloid of skin     Osteopenia     Vitamin D deficiency     Colon adenoma, tubular adenoma     No past medical history on file.    Allergies:  Allergies   Allergen Reactions     Asa [Aspirin] Rash     Sulfa Drugs Rash       Medications:  Current Outpatient Medications   Medication     hydrocortisone (CORTAID) 1 % external cream     hydrocortisone 2.5 % ointment     pimecrolimus (ELIDEL) 1 % external cream     tacrolimus (PROTOPIC) 0.1 % external ointment     vitamin D3 (CHOLECALCIFEROL) 1.25 MG (13716 UT) capsule     No current facility-administered medications for this visit.       Social History:  The patient works as a school nurse. Patient has the following hobbies or non-occupational exposure: nitting, biking    Family History:  Family History   Problem Relation Age of Onset      Neurologic Disorder Mother         pituitary tumor cancer/seizures     Memory loss Mother      Skin Cancer Father      Glaucoma Father      Skin Cancer Brother      Lipids Other        Previous Labs, Allergy Tests, Dermatopathology, Imaging:  none    Referred By: Chioma Garrido MD  [id number] [place] [address]     Allergy Tests:    Past Allergy Test    Order for Future Allergy Testing:    [] Outpatient  [] Inpatient: [facility]..../ Bed ....       Skin Atopy (atopic dermatitis) [] Yes   [x] No .........  Contact allergies:   [] Yes   [] No .during marriage time beltline eczema, nothing else. With Eucerin facial dermatitis --> 15-20 years ago  Hand eczema:   [] Yes   [x] No           Leading hand:   [] R   [] L       [] Ambidextrous         Drug allergies:        [x] Yes   [] No  Which?..Aspirin and Bactrim as child --> delayed type rash. Ibuprofen no problems     Urticaria/Angioedema  [x] Yes   [] No .one time as a child on a farm gen hives.  Food Allergy:  [] Yes   [x] No  which?......  Pets :  [] Yes   [x] No  Which?..no reactions to cat or dog         [x]  Rhinitis   [] Conjunctivitis   [x] Sinusitis (2x)   [] Polyposis   [] Otitis   [] Pharyngitis         []  Postnasal drip    []  none  Operations:   [] Tonsils   [] Septum   [] Sinus   [] Polyposis        [] Asthma bronchiale   [] Coughing      [x]  none  Symptoms (mostly Rhinoconjunctivitis and Asthma) aggravated by:  Season   [] I   [] II   [] III   [] IV   []V   []VI   []VII   []VIII   []IX   []X   []XI   []XII     [x] perennial   Day time      [x] morning   [] noon      [] evening        [] night    [] whole day........  []  none  Location/changes    [] inside        [] outside   [] mountains    [] sea     [] others.............   []  none  Triggers, specific     [] animals     [] plants     [] dust              [] others ...........................    []  none  Triggers, others       [] work          [] psyche    [] sport            []  others .............................  []  none  Irritant                [] phys efforts [] smoke    [] heat/cold     [] odors  []others............... []  none    Order for PATCH TESTS  Reason for tests (suspected allergy): recurrent periorbital dermatitis  Known previous allergies: none  Standardized panels  [x] Standard panel (40 tests)  [x] Preservatives & Antimicrobials (31 tests)  [x] Emulsifiers & Additives (25 tests)   [x] Perfumes/Flavours & Plants (25 tests)  [x] Hairdresser panel (12 tests)  [] Rubber Chemicals (22 tests)  [x] Plastics (26 tests)  [] Colorants/Dyes/Food additives (20 tests)  [] Metals (implants/dental) (24 tests)  [] Local anaesthetics/NSAIDs (13 tests)  [] Antibiotics & Antimycotics (14 tests)   [] Corticosteroids (15 tests)   [] Photopatch test (62 tests)   [] others: ...      [x] Patient's own products: .contac lens solutions and old contact lens    DO NOT test if chemical or biological identity is unknown!     always ask from patient the product information and safety sheets (MSDS)       Order for PRICK TESTS    Reason for tests (suspected allergy): atopy?  Known previous allergies: none    Standardized prick panels  [x] Atopic panel (20 tests)  [] Pediatric Panel (12 tests)  [] Milk, Meat, Eggs, Grains (20 tests)   [] Dust, Epithelia, Feathers (10 tests)  [] Fish, Seafood, Shellfish (17 tests)  [] Nuts, Beans (14 tests)  [] Spice, Vegetable, Fruit (17 tests)  [] Pollen Panel = Tree, Grass, Weed (24 tests)  [] Others: ...      [] Patient's own products: ...    DO NOT test if chemical or biological identity is unknown!     always ask from patient the product information and safety sheets (MSDS)     Standardized intradermal tests  [x] Penicillium notatum [x] Aspergillus fumigatus [x] House dust mites D.far & D. pteron  [] Cat    [] dog  [] Others: ...  [] Bee venom   [] Wasp venom  !!Specific protocol with dilutions!!       Order for Drug allergy tests (prick & Intradermal & patch  tests)    [] Penicillin G  [] Ampicillin [] Cefazolin   [] Ceftriaxone   [] Ceftazidime  [x] Bactrim    [] Others: ...  Order for ... as test date    Atopy Screen (Placed Apr 14, 2023)  No Substance Readings (15 min) Evaluation   POS Histamine 1mg/ml ++    NEG NaCl 0.9% -      No Substance Readings (15 min) Evaluation   1 Alternaria alternata (tenuis)  -    2 Cladosporium herbarum -    3 Aspergillus fumigatus -    4 Penicillium notatum -    5 Dermatophagoides pteronyssinus -    6 Dermatophagoides farinae -    7 Dog epithelium (canis spp) -    8 Cat hair (julisa catus) -    9 Cockroach   (Blatella americana & germanica) -    10 Grass mix midwest   (Cecilia, Orchard, Redtop, Dominick) -    11 Josue grass (sorghum halepense) -    12 Weed mix   (common Cocklebur, Lamb s quarters, rough redroot Pigweed, Dock/Sorrel) -    13 Mug wort (artemisia vulgare) -    14 Ragweed giant/short (ambrosia spp) -    15 White birch (Betula papyrifera) -    16 Tree mix 1 (Pecan, Maple BHR, Oak RVW, american [place], black [place]) -    17 Red cedar (juniperus virginia) -    18 Tree mix 2   (white [place], river/red Birch, black [place], common [place], american Elm) -    19 Box elder/Maple mix (acer spp) -    20 Lamont shagbark (carya ovata) -           Conclusion: no signs for immediate type reactions      Intradermal Testing (Placed Apr 14, 2023)  No Substance Conc.  Reading (15min)  immediate Papule [mm] / Erythema [mm] Reading   (... days)  delayed Papule [mm] / Erythema [mm] Remarks   DF Standard Dust Mite - D. Farinae 1:10 -   -    DP Standard Dust Mite - D. Pteronyssinus 1:10 -   -    A Aspergillus fumigatus  1:10 -   -    P Penicillium notatum 1:10 -   -    Conclusions: no immediate type reactions in IDT. Patient will feed back if any delayed type reactions    ________________________________    Assessment & Plan:    ==> Final Diagnosis:     # chronic recurrent periorbital dermatitis   DDx atopic dermatitis and/or allergic contact  dermatitis  * chronic illness with exacerbation, progression, side effects from treatment    # atopic predisposition? with    Sometimes morning rhinitis  --> prick and IDT without immediate type reaction (delayed?)  * stable chronic illness    # delayed type reaction as child to Bactrim and Aspirin  * acute uncomplicated illness    These conclusions are made at the best of one's knowledge and belief based on the provided evidence such as patient's history and allergy test results and they can change over time or can be incomplete because of missing information's.    ==> Treatment Plan:  See above    Procedures Performed: Allergy tests, including prick, intradermal tests    Staff:  Provider    Follow-up in Derm-Allergy clinic for allergy tests as planned    I spent a total of 32 minutes with Barb Vazquez. This time was spent counseling the patient and/or coordinating care, explaining the allergy tests, performing allergy tests and assessing the clinical relevance.

## 2023-04-14 ENCOUNTER — PRE VISIT (OUTPATIENT)
Dept: ALLERGY | Facility: CLINIC | Age: 63
End: 2023-04-14

## 2023-04-14 ENCOUNTER — OFFICE VISIT (OUTPATIENT)
Dept: ALLERGY | Facility: CLINIC | Age: 63
End: 2023-04-14
Attending: DERMATOLOGY
Payer: COMMERCIAL

## 2023-04-14 DIAGNOSIS — Z88.9 DRUG ALLERGY: ICD-10-CM

## 2023-04-14 DIAGNOSIS — H01.119 CONTACT DERMATITIS OF EYELID, UNSPECIFIED LATERALITY: ICD-10-CM

## 2023-04-14 DIAGNOSIS — H10.10 SEASONAL AND PERENNIAL ALLERGIC RHINOCONJUNCTIVITIS: ICD-10-CM

## 2023-04-14 DIAGNOSIS — J30.89 SEASONAL AND PERENNIAL ALLERGIC RHINOCONJUNCTIVITIS: ICD-10-CM

## 2023-04-14 DIAGNOSIS — J30.2 SEASONAL AND PERENNIAL ALLERGIC RHINOCONJUNCTIVITIS: ICD-10-CM

## 2023-04-14 DIAGNOSIS — Z88.9 ATOPY: Primary | ICD-10-CM

## 2023-04-14 PROCEDURE — 99214 OFFICE O/P EST MOD 30 MIN: CPT | Mod: 25 | Performed by: DERMATOLOGY

## 2023-04-14 PROCEDURE — 95024 IQ TESTS W/ALLERGENIC XTRCS: CPT | Performed by: DERMATOLOGY

## 2023-04-14 PROCEDURE — 95004 PERQ TESTS W/ALRGNC XTRCS: CPT | Performed by: DERMATOLOGY

## 2023-04-14 NOTE — NURSING NOTE
Chief Complaint   Patient presents with     Allergy Consult     Pt is here for an allergy consult. Referred by Dr. Garrido for patch testing. Pt has been experiencing eyelid dermatitis for ~1 year. Treating with tacrolimus.      Emy LUKE RN

## 2023-04-14 NOTE — PATIENT INSTRUCTIONS
_____________________________    PATCH TESTING    WHAT IS A PATCH TEST ?    A patch test is a way of identifying whether a substance has caused a delayed reaction with skin inflammation, such as contact eczema or delayed (after days) reactions to drugs. We will use various different types of test compounds, which may include common allergens in occupation and daily life such as  preservatives, fragrances or even drugs. Most of the time we will use standardized, prefabricated test solutions and the choice of the substances and series tested will depend on the history of the allergic reactions. Sometimes we will test your own products you are exposed at home or at work. In order to avoid severe toxic reactions we need detailed information s or safety sheets about each of these test compounds.    HOW IS A PATCH TEST PERFORMED ?    You will be given three appointments to complete this test. On the first appointment the nurse will apply 100-180 small test chambers each one of them containing a different allergen on your back and/or on your arms. We will use hypoallergenic and somehow waterproof adhesive tape. Afterwards the different sites will be marked with a waterproof marker. These patches must stay in place for 2 days. On the second appointment the patches will be removed and the allergy doctor/nurse will evaluate the skin reactions and maybe re-apply the marks. On the third appointment the allergy doctor will re-evaluate possible allergic reactions and discuss with you the nature of the allergens you react to and how to avoid them.    AVOID THE FOLLOWING:    DO NOT wash your back and other test areas during the entire test period (3-5 days), NO bathing or swimming  AVOID strenuous exercise with sweating  DO NOT scratch the test area  AVOID exposure to UV irradiation (sun, therapy)    Patch tests should be performed when the allergy is resolved  Remove patch tests only if severe reaction (itch, pain, blistering)  and report to your doctor immediately. Ara then the locations of each test field  If patch tests peel off, then try to fix them and record time and ara test field  No oral steroids (e.g. Prednisone) 1 month prior to tests    WHAT ARE THE POSSIBLE RISKS OF PATCH TESTS ?    If you are allergic to a compound tested you will develop after a few days localized skin reactions similar to your previous allergic reaction. This includes formation of red, itchy skin lesions of few mm to cm with small vesicles and even blisters. The lesions will fade off over days and weeks and might sometimes leave for a few months some skin pigmentations. In rare cases a localized reaction to patch tests can become generalized. The tests with your own products might have some risks because they are not standardized and unanticipated reactions can occur. We need as much information as possible to evaluate if your own products are safe to test and under what conditions. This has to be evaluated for each individual product.        ? WHAT ARE THE PREPARATIONS NEEDED FOR THESE VARIOUS ALLERGY TESTS ?    Some medications can affect the reactions to allergens during the tests. Therefore reveal all the medications you take to the allergy team and the doctor will discuss with you the medications before/during the tests. Normally, you have to respect following rules (unless otherwise instructed by doctor):  For prick, intradermal and provocation tests stop antihistamines (e.g. loratadine (Claritin), cetirizine (Zyrtec), fexofenadine (Allegra) etc 1 week prior to the appointment   For patch tests and provocation tests, stop systemic corticosteroids 1 month and topical steroids 1 week prior to tests  Eat normally and take a shower before tests    _____________________________    SKIN PRICK TESTING    WHAT IS A SKIN PRICK TEST (SPT) ?    A skin prick test (SPT) is a simple and reliable diagnostic test used to identify substances ( allergens )  responsible for triggering the symptoms of allergy. The basic SPT panel includes common allergens, such as house dust mites, molds, dog and cat allergens and grass/tree pollen allergens. We have other more specialized series for various food allergens and sometimes we test your own food directly on your skin. Tests will be usually performed on the skin of the forearm (rarely on back). The skin may develop a red and itchy reaction which can be an indication of an allergy to to tested substance, but will be confirmed by discussion with the allergy specialist    HOW IS A SPT PERFORMED ?    The skin will be disinfected with 70% Isopropanol alcohol, then marked and numbered with a pen to identify the areas where the specific allergens will be tested. Afterwards a drop of the allergen solution will be placed on the skin and then the skin gently pricked using the tip of a specially designed prick needle. With this procedure the most superficial part of the skin will be pierced to allow the test solution to diffuse into the skin and make contact with the reactive immune cells. After 15-30min the area will be examined and evaluated for possible allergic reactions.        WHAT ARE THE POSSIBLE RISKS OF SPT ?    If the skin reacts it will develop red, itchy wheals of 5-30mm diameter. The wheal and itch will usually disappear spontaneously after 1-2 hours. Sometimes there might be a delayed reactions after days and this has to be reported to the treating allergy specialist (could be another kind of reaction pattern and important piece of information). Rarely there are more serious generalized allergic reactions observed and therefore you will be under observation of the allergy team during the entire procedure. There is a small risk for some bleeding and skin infection by the SPT.    _____________________________    INTRADERMAL TESTS      WHAT IS AN INTRADERMAL TEST (IDT) ?    An intradermal test (IDT) is basically a  continuation of the SPT and is sometimes proposed if the skin prick test is negative and the person is strongly suspected to have an allergy to a particular substance. IDT is particularly used for diagnosis of drug allergies and only sterile solutions will be tested by IDT. Because more allergen is delivered to the skin than in SPT the IDT can add more sensitivity to the allergy tests, but with a higher potential risk.     HOW IS AN INTRADERMAL TEST (IDT) PERFORMED ?    A small amount (~ 0.05ml) of the suspected allergen is injected with a very fine insulin needle just beneath the skin. The injections are made at spaced intervals after disinfection and marking of the skin areas. The tests are usually performed on the forearm (rarely back). The initial test will be started with a very diluted solution and if the results are negatives the procedure might be repeated with serial dilutions of higher concentrations. Therefore, the estimated duration of this test is about 45-60 min. Sometimes we observe delayed type reactions to the intradermal tests after 1-4 days and if this is the case take a photo and inform our staff and load the photo into HERM[facility] DELOR. Best would be to take the photos with a ruler that we know at which position the positive test was.          WHAT ARE THE POSSIBLE RISKS OF IDT ?    If the skin reacts it will develop red, itchy wheals of 5-30mm diameter. The wheal and itch will usually disappear spontaneously after 1-2 hours. Sometimes there might be a delayed reactions after days and this has to be reported to the treating allergy specialist (could be another kind of reaction pattern and important piece of information). Rarely there are more serious generalized allergic reactions observed and therefore you will be under observation of the allergy team during the entire procedure. Only sterile solutions will be used for injections, but there is still a small risk for infections or unpredictable local  toxic reactions by the allergens. Some transient bleeding might occur.     _____________________________      ORAL PROVOCATION TEST      WHAT IS AN  ORAL PROVOCATION TEST (OPT) ?    An oral provocation test (OPT) is a procedure primarily used to exclude a specific allergy to a particular drug or food. These substances are then administered orally, rarely in case of drugs by subcutaneous or intravenous injections. This test is only conducted if the skin prick and intradermal and/or patch tests were negatives. A formal written consent will be taken prior to the provocation test.         HOW IS AN ORAL PROVOCATION TEST PERFORMED?    For oral (food/drugs) provocation tests you will have to ingest the food or drug hidden in a capsule or in its natural form. The test will usually be placebo-controlled and will include a capsule or other application form not containing the suspected allergen, but non-reactive Mannitol sugar. The various drugs/food will be given at specific time intervals under strict medical supervision.     Two to six serial doses containing the test food or drug will given at normally 30min time intervals, which might vary for each individual. You will be required to stay at the clinic at all times so that the allergy doctors and nurses can early recognize and treat immediately possible allergic reactions. After the last dose you have to stay during at least 1h for observation. The entire procedure will take about 2-6hours, depending on the number of incremental doses and the observation time.     WHAT ARE THE POSSIBLE RISKS OF ORAL PROVOCATION TEST ?    The provocation tests have the highest risk potential of all the tests and therefore close observation is mandatory. The entire procedure will be discussed prior to the test (except when the placebo will be given). The reactions can go from local allergic reactions to more severe generalized reactions. Therefore, we will not perform provocation tests  without prior evaluation by prick or intradermal tests and we plan to exclude an allergy by this test. If there is a higher risk, the test will be performed in a bed and with a secure intravenous access with infusion. The medication of a severe allergic reactions include high dose corticosteroids, antihistamines and if necessary epinephrine (Epi-Pen).    Useful Contact Information    Change of appointments or allergy-related [phone]  Email: [email or web address]  Location/address: [address] [place] [id number]    If you develop any serious or adverse allergic reaction after office hours please seek immediate medical assistance at the nearest clinic or emergency room.

## 2023-04-14 NOTE — Clinical Note
4/14/2023         RE: Barb Vazquez  [address]  [facility] [address]        Dear Colleague,    Thank you for referring your patient, Barb Vazquez, to the [facility] [place]. Please see a copy of my visit note below.    [facility] Dermato-allergology Note  Office visit  Encounter Date: Apr 14, 2023  ____________________________________________    CC: No chief complaint on file.      HPI:  (Apr 14, 2023)  Ms. Barb Vazquez is a(n) 62 year old female who presents today as new patient for allergy consultation  - since about 1.5 years patient had recurrent dermatitis of the eyelids --> Tacrolimus ointment helped, but not totally. As soon as she stops it it comes back  - alcohol seems to induce on these locations a flush like reaction  - dies hair every 5 weeks.  - wears contact lenses and uses solutions for contact lenses  - otherwise feeling well in usual state of health    Physical exam:  General: In no acute distress, well-developed, well-nourished  Eyes: no conjunctivitis  ENT: no signs of rhinitis   Pulmonary: no wheezing or coughing  Skin: Focused examination of the skin on test sites was performed = see test results below  Periorbital hyperpigmentations, now on Tacrolimus no desquamation    Past Medical History:   Patient Active Problem List   Diagnosis     CARDIOVASCULAR SCREENING; LDL GOAL LESS THAN 160     Blepharospasm     Keloid of skin     Osteopenia     Vitamin D deficiency     Colon adenoma, tubular adenoma     No past medical history on file.    Allergies:  Allergies   Allergen Reactions     Asa [Aspirin] Rash     Sulfa Drugs Rash       Medications:  Current Outpatient Medications   Medication     hydrocortisone (CORTAID) 1 % external cream     hydrocortisone 2.5 % ointment     pimecrolimus (ELIDEL) 1 % external cream     tacrolimus (PROTOPIC) 0.1 % external ointment     vitamin D3 (CHOLECALCIFEROL) 1.25 MG (51836 UT) capsule     No  current facility-administered medications for this visit.       Social History:  The patient works as a school nurse. Patient has the following hobbies or non-occupational exposure: nitting, biking    Family History:  Family History   Problem Relation Age of Onset     Neurologic Disorder Mother         pituitary tumor cancer/seizures     Memory loss Mother      Skin Cancer Father      Glaucoma Father      Skin Cancer Brother      Lipids Other        Previous Labs, Allergy Tests, Dermatopathology, Imaging:  none    Referred By: Chioma Garrido MD  [address] [place] [address]     Allergy Tests:    Past Allergy Test    Order for Future Allergy Testing:    [] Outpatient  [] Inpatient: [facility]..../ Bed ....       Skin Atopy (atopic dermatitis) [] Yes   [x] No .........  Contact allergies:   [] Yes   [] No .during marriage time beltline eczema, nothing else. With Eucerin facial dermatitis --> 15-20 years ago  Hand eczema:   [] Yes   [x] No           Leading hand:   [] R   [] L       [] Ambidextrous         Drug allergies:        [x] Yes   [] No  Which?..Aspirin and Bactrim as child --> delayed type rash. Ibuprofen no problems     Urticaria/Angioedema  [x] Yes   [] No .one time [place] a child [place] a farm gen hives.  Food Allergy:  [] Yes   [x] No  which?......  Pets :  [] Yes   [x] No  Which?..no reactions to cat or dog         [x]  Rhinitis   [] Conjunctivitis   [x] Sinusitis (2x)   [] Polyposis   [] Otitis   [] Pharyngitis         []  Postnasal drip    []  none  Operations:   [] Tonsils   [] Septum   [] Sinus   [] Polyposis        [] Asthma bronchiale   [] Coughing      [x]  none  Symptoms (mostly Rhinoconjunctivitis and Asthma) aggravated by:  Season   [] I   [] II   [] III   [] IV   []V   []VI   []VII   []VIII   []IX   []X   []XI   []XII     [x] perennial   Day time      [x] morning   [] noon      [] evening        [] night    [] whole day........  []  none  Location/changes    [] inside        []  outside   [] mountains    [] sea     [] others.............   []  none  Triggers, specific     [] animals     [] plants     [] dust              [] others ...........................    []  none  Triggers, others       [] work          [] psyche    [] sport            [] others .............................  []  none  Irritant                [] phys efforts [] smoke    [] heat/cold     [] odors  []others............... []  none    Order for PATCH TESTS  Reason for tests (suspected allergy): recurrent periorbital dermatitis  Known previous allergies: none  Standardized panels  [x] Standard panel (40 tests)  [x] Preservatives & Antimicrobials (31 tests)  [x] Emulsifiers & Additives (25 tests)   [x] Perfumes/Flavours & Plants (25 tests)  [x] Hairdresser panel (12 tests)  [] Rubber Chemicals (22 tests)  [x] Plastics (26 tests)  [] Colorants/Dyes/Food additives (20 tests)  [] Metals (implants/dental) (24 tests)  [] Local anaesthetics/NSAIDs (13 tests)  [] Antibiotics & Antimycotics (14 tests)   [] Corticosteroids (15 tests)   [] Photopatch test (62 tests)   [] others: ...      [x] Patient's own products: .contac lens solutions and old contact lens    DO NOT test if chemical or biological identity is unknown!     always ask from patient the product information and safety sheets (MSDS)       Order for PRICK TESTS    Reason for tests (suspected allergy): atopy?  Known previous allergies: none    Standardized prick panels  [x] Atopic panel (20 tests)  [] Pediatric Panel (12 tests)  [] Milk, Meat, Eggs, Grains (20 tests)   [] Dust, Epithelia, Feathers (10 tests)  [] Fish, Seafood, Shellfish (17 tests)  [] Nuts, Beans (14 tests)  [] Spice, Vegetable, Fruit (17 tests)  [] Pollen Panel = Tree, Grass, Weed (24 tests)  [] Others: ...      [] Patient's own products: ...    DO NOT test if chemical or biological identity is unknown!     always ask from patient the product information and safety sheets (MSDS)     Standardized  intradermal tests  [x] Penicillium notatum [x] Aspergillus fumigatus [x] House dust mites D.far & D. pteron  [] Cat    [] dog  [] Others: ...  [] Bee venom   [] Wasp venom  !!Specific protocol with dilutions!!       Order for Drug allergy tests (prick & Intradermal & patch tests)    [] Penicillin G  [] Ampicillin [] Cefazolin   [] Ceftriaxone   [] Ceftazidime  [x] Bactrim    [] Others: ...  Order for ... as test date    Atopy Screen (Placed Apr 14, 2023)  No Substance Readings (15 min) Evaluation   POS Histamine 1mg/ml ++    NEG NaCl 0.9% -      No Substance Readings (15 min) Evaluation   1 Alternaria alternata (tenuis)  -    2 Cladosporium herbarum -    3 Aspergillus fumigatus -    4 Penicillium notatum -    5 Dermatophagoides pteronyssinus -    6 Dermatophagoides farinae -    7 Dog epithelium (canis spp) -    8 Cat hair (julisa catus) -    9 Cockroach   (Blatella americana & germanica) -    10 Grass mix midwest   (Cecilia, Orchard, Redtop, Dominick) -    11 Josue grass (sorghum halepense) -    12 Weed mix   (common Cocklebur, Lamb s quarters, rough redroot Pigweed, Dock/Sorrel) -    13 Mug wort (artemisia vulgare) -    14 Ragweed giant/short (ambrosia spp) -    15 White birch (Betula papyrifera) -    16 Tree mix 1 (Pecan, Maple BHR, Oak RVW, american [place], black [place]) -    17 Red cedar (juniperus virginia) -    18 Tree mix 2   (white Conr[place], river/red Birch, black [place], common [place], american Elm) -    19 Box elder/Maple mix (acer spp) -    20 [place] shagbark (carya ovata) -           Conclusion: no signs for immediate type reactions      Intradermal Testing (Placed Apr 14, 2023)  No Substance Conc.  Reading (15min)  immediate Papule [mm] / Erythema [mm] Reading   (... days)  delayed Papule [mm] / Erythema [mm] Remarks   DF Standard Dust Mite - D. Farinae 1:10 -   -    DP Standard Dust Mite - D. Pteronyssinus 1:10 -   -    A Aspergillus fumigatus  1:10 -   -    P Penicillium notatum 1:10 -   -     Conclusions: no immediate type reactions in IDT. Patient will feed back if any delayed type reactions    ________________________________    Assessment & Plan:    ==> Final Diagnosis:     # chronic recurrent periorbital dermatitis   DDx atopic dermatitis and/or allergic contact dermatitis  {jgstatus:229345}    # atopic predisposition? with    Sometimes morning rhinitis  --> prick and IDT without immediate type reaction (delayed?)  {jgstatus:276942}    # delayed type reaction as child to Bactrim and Aspirin  {jgstatus:373114}  {jgallergytestfinal:777796}  - ***    These conclusions are made at the best of one's knowledge and belief based on the provided evidence such as patient's history and allergy test results and they can change over time or can be incomplete because of missing information's.    ==> Treatment Plan:  ***    Procedures Performed: Allergy tests, including prick, intradermal and patch tests, drug allergy or provocation tests***    {kkstaffinvolved:741962} Provider    Follow-up in Derm-Allergy clinic ***    I spent a total of 32 minutes with Barb Vazquez. This time was spent counseling the patient and/or coordinating care, explaining the allergy tests, performing allergy tests and assessing the clinical relevance.        Again, thank you for allowing me to participate in the care of your patient.        Sincerely,        Dannie Beebe MD

## 2023-04-16 ENCOUNTER — MYC MEDICAL ADVICE (OUTPATIENT)
Dept: DERMATOLOGY | Facility: CLINIC | Age: 63
End: 2023-04-16
Payer: COMMERCIAL

## 2023-04-27 ENCOUNTER — OFFICE VISIT (OUTPATIENT)
Dept: INTERNAL MEDICINE | Facility: CLINIC | Age: 63
End: 2023-04-27
Payer: COMMERCIAL

## 2023-04-27 VITALS
WEIGHT: 158.1 LBS | BODY MASS INDEX: 26.99 KG/M2 | HEIGHT: 64 IN | SYSTOLIC BLOOD PRESSURE: 104 MMHG | OXYGEN SATURATION: 96 % | DIASTOLIC BLOOD PRESSURE: 65 MMHG | HEART RATE: 68 BPM

## 2023-04-27 DIAGNOSIS — Z13.220 SCREENING FOR HYPERLIPIDEMIA: Primary | ICD-10-CM

## 2023-04-27 DIAGNOSIS — Z13.1 SCREENING FOR DIABETES MELLITUS: ICD-10-CM

## 2023-04-27 PROCEDURE — 99396 PREV VISIT EST AGE 40-64: CPT | Performed by: NURSE PRACTITIONER

## 2023-04-27 NOTE — PROGRESS NOTES
LC in SCN to observe feeding.Baby latched well to right breast in a cradle hold. Education given to parents.   Encouraged breast compression during feedings.     Lactation warmline given for f/u as needed.   S: Barb Vazquez is a 62 year old female here for annual preventive physical.    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    She exercises to on-line yoga and will be biking again soon  but no longer runs.    She has been healthy and has no prescription drugs or specific concerns today.   Mammos are UTD. Last pap 4/22.  She is still taking daily vitamin D.   She owns her own home and lives alone.  Smoking Status: Never smoker  Smokeless Tobacco: Never used   Alcohol Use: Yes   Marital Status:   Employment status: School nurse, plans to retire at age 63  Three grown children, one lives in [place].         Patient Active Problem List   Diagnosis     CARDIOVASCULAR SCREENING; LDL GOAL LESS THAN 160     Blepharospasm     Keloid of skin     Osteopenia     Vitamin D deficiency     Colon adenoma, tubular adenoma          No past medical history on file.       No past surgical history on file.         Social History     Tobacco Use     Smoking status: Never     Smokeless tobacco: Never   Vaping Use     Vaping status: Not on file   Substance Use Topics     Alcohol use: Yes     Comment: very occassional            Family History   Problem Relation Age of Onset     Neurologic Disorder Mother         pituitary tumor cancer/seizures     Memory loss Mother      Skin Cancer Father      Glaucoma Father      Skin Cancer Brother      Lipids Other                Allergies   Allergen Reactions     Asa [Aspirin] Rash     Sulfa Antibiotics Rash            Current Outpatient Medications   Medication Sig Dispense Refill     tacrolimus (PROTOPIC) 0.1 % external ointment Apply topically 2 times daily As needed for rash on the eyelids. Can mix in vaseline if it feels too irritating. 30 g 11     vitamin D3 (CHOLECALCIFEROL) 1.25 MG (70820 UT) capsule Take 1 capsule (50,000 Units) by mouth every 7 days 8 capsule 1       REVIEW OF SYSTEMS:  See above.    O:   /65 (BP Location: Right arm, Patient Position: Sitting, Cuff  "Size: Adult Regular)   Pulse 68   Ht 1.638 m (5' 4.49\")   Wt 71.7 kg (158 lb 1.6 oz)   SpO2 96%   BMI 26.73 kg/m    .Constitutional: no distress, comfortable, pleasant   Eyes: anicteric, conjunctiva pink, she has contact lenses.  Neck: supple with full range of motion, no thyromegaly.   BREAST: normal without masses, tenderness or nipple discharge and no palpable axillary masses or adenopathy  Cardiovascular: regular rate and rhythm, normal S1 and S2, no murmurs, rubs or gallops, peripheral pulses full and symmetric   Respiratory: clear to auscultation, no wheezes or crackles, normal breath sounds   Gastrointestinal: positive bowel sounds, nontender, no hepatosplenomegaly, no masses   Musculoskeletal: full range of motion, no edema   Skin: No rashes..   Neurological: normal gait, normal speech, no tremor. A and O x 3, good historian.  Psychological: appropriate mood, good eye contact, normal affect   Lymph: no axillary, cervical,  supraclavicular, infraclavicular or inguinal nodes.      The 10-year ASCVD risk score (Janie GANN, et al., 2019) is: 3.2%    Values used to calculate the score:      Age: 62 years      Sex: Female      Is Non- : No      Diabetic: No      Tobacco smoker: No      Systolic Blood Pressure: 104 mmHg      Is BP treated: No      HDL Cholesterol: 64 mg/dL      Total Cholesterol: 208 mg/dL    A/P:  Barb was seen today for physical.    Diagnoses and all orders for this visit:    Screening for hyperlipidemia  -     Lipid panel reflex to direct LDL Fasting; Future    Screening for diabetes mellitus  -     Basic metabolic panel  (Ca, Cl, CO2, Creat, Gluc, K, Na, BUN); Future  -     CBC with platelets; Future      The patient voiced understanding of the information discussed and all questions were answered.     I spent a total of  35  minutes in the care of this pt during today's office visit. This time includes reviewing the patient's chart and prior history, obtaining a " history, performing an examination and evaluation and counseling the patient. This time also includes ordering medications or tests necessary in addition to communication to other member's of the patient's health care team. Time spent in documentation and care coordination is included.     Daly BISHOP CNP                  Answers for HPI/ROS submitted by the patient on 4/23/2023  General Symptoms: No  Skin Symptoms: No  HENT Symptoms: No  EYE SYMPTOMS: No  HEART SYMPTOMS: No  LUNG SYMPTOMS: No  INTESTINAL SYMPTOMS: No  URINARY SYMPTOMS: No  GYNECOLOGIC SYMPTOMS: No  BREAST SYMPTOMS: No  SKELETAL SYMPTOMS: No  BLOOD SYMPTOMS: No  NERVOUS SYSTEM SYMPTOMS: No  MENTAL HEALTH SYMPTOMS: No

## 2023-04-27 NOTE — NURSING NOTE
"Barb Vazquez is a 62 year old female patient that presents today in clinic for the following:    Chief Complaint   Patient presents with     Physical     The patient's allergies and medications were reviewed as noted. A set of vitals were recorded as noted without incident: /65 (BP Location: Right arm, Patient Position: Sitting, Cuff Size: Adult Regular)   Pulse 68   Ht 1.638 m (5' 4.49\")   Wt 71.7 kg (158 lb 1.6 oz)   SpO2 96%   BMI 26.73 kg/m  . The patient does not have any other questions for the provider.    Devendra Still, EMT 5:55 PM on 4/27/2023   "

## 2023-05-17 ENCOUNTER — LAB (OUTPATIENT)
Dept: LAB | Facility: CLINIC | Age: 63
End: 2023-05-17
Payer: COMMERCIAL

## 2023-05-17 DIAGNOSIS — Z13.1 SCREENING FOR DIABETES MELLITUS: ICD-10-CM

## 2023-05-17 DIAGNOSIS — E78.00 ELEVATED LDL CHOLESTEROL LEVEL: Primary | ICD-10-CM

## 2023-05-17 DIAGNOSIS — Z13.220 SCREENING FOR HYPERLIPIDEMIA: ICD-10-CM

## 2023-05-17 LAB
ANION GAP SERPL CALCULATED.3IONS-SCNC: 9 MMOL/L (ref 7–15)
BUN SERPL-MCNC: 17 MG/DL (ref 8–23)
CALCIUM SERPL-MCNC: 9.2 MG/DL (ref 8.8–10.2)
CHLORIDE SERPL-SCNC: 105 MMOL/L (ref 98–107)
CHOLEST SERPL-MCNC: 217 MG/DL
CREAT SERPL-MCNC: 0.96 MG/DL (ref 0.51–0.95)
DEPRECATED HCO3 PLAS-SCNC: 27 MMOL/L (ref 22–29)
ERYTHROCYTE [DISTWIDTH] IN BLOOD BY AUTOMATED COUNT: 13.1 % (ref 10–15)
GFR SERPL CREATININE-BSD FRML MDRD: 66 ML/MIN/1.73M2
GLUCOSE SERPL-MCNC: 91 MG/DL (ref 70–99)
HCT VFR BLD AUTO: 42.3 % (ref 35–47)
HDLC SERPL-MCNC: 68 MG/DL
HGB BLD-MCNC: 13.7 G/DL (ref 11.7–15.7)
LDLC SERPL CALC-MCNC: 139 MG/DL
MCH RBC QN AUTO: 30.4 PG (ref 26.5–33)
MCHC RBC AUTO-ENTMCNC: 32.4 G/DL (ref 31.5–36.5)
MCV RBC AUTO: 94 FL (ref 78–100)
NONHDLC SERPL-MCNC: 149 MG/DL
PLATELET # BLD AUTO: 251 10E3/UL (ref 150–450)
POTASSIUM SERPL-SCNC: 4.3 MMOL/L (ref 3.4–5.3)
RBC # BLD AUTO: 4.51 10E6/UL (ref 3.8–5.2)
SODIUM SERPL-SCNC: 141 MMOL/L (ref 136–145)
TRIGL SERPL-MCNC: 48 MG/DL
WBC # BLD AUTO: 5.5 10E3/UL (ref 4–11)

## 2023-05-17 PROCEDURE — 80061 LIPID PANEL: CPT | Performed by: PATHOLOGY

## 2023-05-17 PROCEDURE — 80048 BASIC METABOLIC PNL TOTAL CA: CPT | Performed by: PATHOLOGY

## 2023-05-17 PROCEDURE — 85027 COMPLETE CBC AUTOMATED: CPT | Performed by: PATHOLOGY

## 2023-05-17 PROCEDURE — 36415 COLL VENOUS BLD VENIPUNCTURE: CPT | Performed by: PATHOLOGY

## 2023-05-17 RX ORDER — ATORVASTATIN CALCIUM 10 MG/1
10 TABLET, FILM COATED ORAL DAILY
Qty: 90 TABLET | Refills: 3 | Status: SHIPPED | OUTPATIENT
Start: 2023-05-17 | End: 2024-03-01

## 2023-06-29 ENCOUNTER — TELEPHONE (OUTPATIENT)
Dept: ALLERGY | Facility: CLINIC | Age: 63
End: 2023-06-29
Payer: COMMERCIAL

## 2023-06-29 NOTE — TELEPHONE ENCOUNTER
Follow-Up:4-8 weeks for foot care   Standardized panels  [x]? Standard panel (40 tests)  [x]? Preservatives & Antimicrobials (31 tests)  [x]? Emulsifiers & Additives (25 tests)   [x]? Perfumes/Flavours & Plants (25 tests)  [x]? Hairdresser panel (12 tests)  []? Rubber Chemicals (22 tests)  [x]? Plastics (26 tests)  []? Colorants/Dyes/Food additives (20 tests)  []? Metals (implants/dental) (24 tests)  []? Local anaesthetics/NSAIDs (13 tests)  []? Antibiotics & Antimycotics (14 tests)   []? Corticosteroids (15 tests)   []? Photopatch test (62 tests)   []? others: ...                         [x]? Patient's own products: .contac lens solutions and old contact lens  - DO NOT test if chemical or biological identity is unknown!   - always ask from patient the product information and safety sheets (MSDS)   STANDARD Series                                          # Substance 2 days 4 days remarks     1 Romie Mix [C] - -       2 Colophony - -       3  2-Mercaptobenzothiazole  - -       4 Methylisothiazolinone - -       5 Carba Mix - -       6 Thiuram Mix [A] - -       7 Bisphenol A Epoxy Resin - -       8 R-Tlde-Ljjahnryacx-Formaldehyde Resin - -       9 Mercapto Mix [A] - -       10 Black Rubber Mix- PPD [B] - -       11 Potassium Dichromate  -  -       12 Balsam of [place] (Myroxylon Pereirae Resin) - -       13 Nickel Sulphate Hexahydrate - -       14 Mixed Dialkyl Thiourea - -       15 Paraben Mix [B] - -       16 Methyldibromo Glutaronitrile - -       17 Fragrance Mix 8% - -       18 2-Bromo-2-Nitropropane-1,3-Diol (Bronopol) CT - -       19 Lyral - -       20 Tixocortol-21- Pivalate CT - -       21 Diazolidinyl urea (Germall II) - -        22 Methyl Methacrylate - -       23 Cobalt (II) Chloride Hexahydrate - -       24 Fragrance Mix II  - -       25 Compositae Mix - -       26 Benzoyl Peroxide - -       27 Bacitracin - -       28 Formaldehyde - -       29 Methylchloroisothiazolinone / Methylisothiazolinone - -       30 Corticosteroid Mix CT - -       31  Sodium Lauryl Sulfate - -       32 Lanolin Alcohol - -       33 Turpentine - -       34 Cetylstearylalcohol - -       35 Chlorhexidine Dicluconate - -       36 Budesonide - -       37 Imidazolidinyl Urea  - -       38 Ethyl-2 Cyanoacrylate - -       39 Quaternium 15 (Dowicil 200) - -       40 Decyl Glucoside - -     PRESERVATIVES & ANTIMICROBIALS        # Substance 2 days 4 days remarks   41 1  1,2-Benzisothiazoline-3-One, Sodium Salt - -     2  1,3,5-Dennis (2-Hydroxyethyl) - Hexahydrotriazine (Grotan BK) - -     3 5-Zaegsvszhcosb-5-Nitro-1, 3-Propanediol - -     4  3, 4, 4' - Triclocarban - -    45 5 4 - Chloro - 3 - Cresol - -     6 4 - Chloro - 4 - Xylenol (PCMX) - -     7 7-Ethylbicyclooxazolidine (Bioban PO4204) - -     8 Benzalkonium Chloride CT - -     9 Benzyl Alcohol - -    50 10 Cetalkonium Chloride - -     11 Cetylpyrimidine Chloride  - -     12 Chloroacetamide - -     13 DMDM Hydantoin - -     14 Glutaraldehyde - -    55 15 Triclosan - -     16 Glyoxal Trimeric Dihydrate - -     17 Iodopropynyl Butylcarbamate - -     18 Octylisothiazoline - -     19 Bithionol CT - -    60 20 Bioban P 1487 (Nitrobutyl) Morpholine/(Ethylnitro-Trimethylene) Dimorpholine - -     21 Phenoxyethanol - -     22 Phenyl Salicylate - -     23 Povidone Iodine - -     24 Sodium Benzoate - -    65 25 Sodium Disulfite - -     26 Sorbic Acid - -     27 Thimerosal - -     28 Melamine Formaldehyde Resin - -     29 Ethylenediamine Dihydrochloride - -      Parabens      70 30 Butyl-P-Hydroxybenzoate - -     31 Ethyl-P-Hydroxybenzoate - -     32 Methyl-P-Hydroxybenzoate - -    73 33 Propyl-P-Hydroxybenzoate - -    EMULSIFIERS & ADDITIVES       # Substance 2 days 4 days remarks   74 1 Polyethylene Glycol-400 - -    75 2 Cocamidopropyl Betaine - -     3 Amerchol L101 - -     4 Propylene Glycol - -     5 Triethanolamine - -     6 Sorbitane Sesquiolate CT - -    80 7 Isopropylmyristate - -     8 Polysorbate 80 CT - -     9 Amidoamine    (Stearamidopropyl Dimethylamine) - -     10 Oleamidopropyl Dimethylamine - -     11 Lauryl Glucoside - -    85 12 Coconut Diethanolamide  - -     13 2-Hydroxy-4-Methoxy Benzophenone (Oxybenzone) - -     14 Benzophenone-4 (Sulisobenzon) - -     15 Propolis - -     16 Dexpanthenol - -    90 17 Abitol - -     18 Tert-Butylhydroquinone - -     19 Benzyl Salicylate - -     20 Dimethylaminopropylamin (DMPA) - -     21 Zinc Pyrithione (Zinc Omadine)  - -    95 22 Dennis(Hydroxymethyl) Nitromethane  - -      Antioxidant       23 Dodecyl Gallate - -     24 Butylhydroxyanisole (BHA) - -     25 Butylhydroxytoluene (BHT) - -     26 Di-Alpha-Tocopherol (Vit E) - -    100 27 Propyl Gallate - -    PERFUMES, FLAVORS & PLANTS        # Substance 2 days 4 days remarks   101 1 Benzyl Cinnamate - -     2 Di-Limonene (Dipentene) - -     3 Cananga Odorata (Elyssa Bergeron) (I) - -     4 Lichen Acid Mix - -    105 5 Mentha Piperita Oil (Peppermint Oil) - -     6 Sesquiterpenelactone mix - -     7 Tea Tree Oil, Oxidized - -     8 Wood Tar Mix - -     9 Abietic Acid - -    110 10 Lavendula Angustifolia Oil (Lavender Oil) - -     11 Fragrance mix II CT * 14% - -      Fragrance Mix I       12 [place]moss Absolute - -     13 Eugenol - -     14 Geraniol - -    115 15 Hydroxycitronellal - -     16 Isoeugenol - -     17 Cinnamic Aldehyde - -     18 Cinnamic Alcohol  - -      Fragrance mix II       19 Citronellol - -    120 20 Alpha-Hexylcinnamic Aldehyde    - -     21 Citral - -     22 Farnesol - -    123 23 Coumarin - -      Hexylcinnamic aldehyde, Coumarin, Farnesol, Hydroxyisohexy3-cyclohexene carboxaldehyde, citral, citrolellol  HAIRDRESSER        # Substance 2 days 4 days remarks   124 1 P-Phenylenediamine -  -    125 2 P-Toluenediamine Sulphate  -  -     3 Ammonium Thioglycolate - -     4 Ammonium Persulfate - -     5 Resorcinol - -     6 3-Aminophenol - -    130 7 P-Aminophenol - -     8 Glyceryl Monothioglycolate - -    132 9 Pyrogallol - -   "  PLASTICS (Acrylates/Epoxy Systems)       # Substance 2 days 4 days remarks     Acrylates - -    133 1 2-Hydroxyethyl Methacrylate (HEMA) - -     2 1,4-Butandioldimethacrylate (BUDMA) - -    135 3  2-Ethylhexyl Acrylate - -     4 Bisphenol-A-Dimethacrylate  - -     5 Diurethane-Dimethacrylate - -     6 Ethyleneglycoldimethacrylate (EGDMA) - -     7 Pentaerythritoltriacrylate (JACINDA) - -    140 8 Triethylene Glycol Dimethacrylate (TEGDMA) - -      Synthetic material/additives        9 L-Xula-Owkscduhdxc - -     10 Tricresyl Phosphate - -     11 1-Llcd-Vplibwqlhcmqq - -     12 Dioctyl phtalate(DEHP,DOP) / (Dimethylhexylphthalate)  - -    145 13 Dibutylphthalate - -     14 Dimethylphthalate - -     15 Toluene-2,4-Diisocyanate - -     16 Diphenylmethane-4,4''-Diisocyanate - -      EPOXY RESIN SYSTEMS        Reactive Solvents - -     17 Cresyl Glycidyl Ether - -    150 18 Butyl Glycidyl Ether - -     19 Phenyl Glycidyl Ether - -     20 1,4-Butanediol Diglycidyl Ether - -     21 1,6-Hexanediole Diglycidyl Ether - -      Hardener / Accelerator - -     22 Triethylenetetramine - -    155 23 Diethylenetriamine - -     24 Isophorone Diamine (IPD) - -     25 N,N-Dimethyl-P-Toluidine - -    158 26 Isobornyl Acrylate - -    OTHER PRODUCTS        # Substance Conc  % solv 2 days 4 days remarks    1          2          3          4          5          6          7          8          9          10           Patients own products:  è DO NOT test if chemical or biological identity is unknown!   - always ask from patient the product information and safety sheets and consult with the physician   - check neutral pH with pH indicator paper (do not test pH below 5 or over 8 or consult with physician)  - leave-on cosmetics can be tested \"as is\"  - rinse-off products have to be diluted with water, buffer, olive oil or paraffin (discuss with physician)  à remember:   - non-specific toxic/corrosive or biological reactions can occur  - tests " with patients own products are not standardized and test conditions are not optimized for patch tests. Whenever possible test with standardized test series and correlate use of product with the result of the patch tests  - if not sure if compounds can be tested under occlusion in patch tests consider open application tests    Results of patch tests:                         Interpretation:  - Negative                    A    = Allergic      (+) Erythema    TI   = Toxic/irritant   + E + Infiltration    RaP = Relevance at Present     ++ E/I + Papulovesicle   Rpr  = Relevance Previously     +++ E/I/P + Blister     nR   = No Relevance

## 2023-06-30 NOTE — PROGRESS NOTES
[facility] Dermato-allergology Note  Office visit  Encounter Date: Jul 3, 2023  ____________________________________________    CC: No chief complaint on file.      HPI:  (Jul 3, 2023)  Ms. Barb Vazquez is a(n) 62 year old female who presents today as a return patient for allergy tests as planned  - Follow-up in Derm-Allergy clinic for allergy tests as planned  - otherwise feeling well in usual state of health    Physical exam:  General: In no acute distress, well-developed, well-nourished  Eyes: no conjunctivitis  ENT: no signs of rhinitis   Pulmonary: no wheezing or coughing  Skin: Focused examination of the skin on test sites was performed = see test results below  No active eczematous skin lesions on tests sites, particularly back    Earlier History and Allergy exams:  (Apr 14, 2023)  - since about 1.5 years patient had recurrent dermatitis of the eyelids --> Tacrolimus ointment helped, but not totally. As soon as she stops it it comes back  - alcohol seems to induce on these locations a flush like reaction  - dies hair every 5 weeks.  - wears contact lenses and uses solutions for contact lenses  - Periorbital hyperpigmentations, now on Tacrolimus no desquamation    Past Medical History:   Patient Active Problem List   Diagnosis     CARDIOVASCULAR SCREENING; LDL GOAL LESS THAN 160     Blepharospasm     Keloid of skin     Osteopenia     Vitamin D deficiency     Colon adenoma, tubular adenoma     No past medical history on file.    Allergies:  Allergies   Allergen Reactions     Asa [Aspirin] Rash     Sulfa Antibiotics Rash       Medications:  Current Outpatient Medications   Medication     atorvastatin (LIPITOR) 10 MG tablet     tacrolimus (PROTOPIC) 0.1 % external ointment     vitamin D3 (CHOLECALCIFEROL) 1.25 MG (98655 UT) capsule     Current Facility-Administered Medications   Medication     [START ON 7/3/2023] sulfamethoxazole-trimethoprim (BACTRIM) 80 mg for allergy testing        Social History:  The patient works as a school nurse. Patient has the following hobbies or non-occupational exposure: nitting, biking    Family History:  Family History   Problem Relation Age of Onset     Neurologic Disorder Mother         pituitary tumor cancer/seizures     Memory loss Mother      Skin Cancer Father      Glaucoma Father      Skin Cancer Brother      Lipids Other        Previous Labs, Allergy Tests, Dermatopathology, Imaging:  none    Referred By: Chioma Garrido MD  [address] [place] [address]     Allergy Tests:    Past Allergy Test    Order for Future Allergy Testing:    [] Outpatient  [] Inpatient: [facility]..../ Bed ....       Skin Atopy (atopic dermatitis) [] Yes   [x] No .........  Contact allergies:   [] Yes   [] No .during marriage time beltline eczema, nothing else. With Eucerin facial dermatitis --> 15-20 years ago  Hand eczema:   [] Yes   [x] No           Leading hand:   [] R   [] L       [] Ambidextrous         Drug allergies:        [x] Yes   [] No  Which?..Aspirin and Bactrim as child --> delayed type rash. Ibuprofen no problems     Urticaria/Angioedema  [x] Yes   [] No .one time as a child [place] a farm gen hives.  Food Allergy:  [] Yes   [x] No  which?......  Pets :  [] Yes   [x] No  Which?..no reactions to cat or dog         [x]  Rhinitis   [] Conjunctivitis   [x] Sinusitis (2x)   [] Polyposis   [] Otitis   [] Pharyngitis         []  Postnasal drip    []  none  Operations:   [] Tonsils   [] Septum   [] Sinus   [] Polyposis        [] Asthma bronchiale   [] Coughing      [x]  none  Symptoms (mostly Rhinoconjunctivitis and Asthma) aggravated by:  Season   [] I   [] II   [] III   [] IV   []V   []VI   []VII   []VIII   []IX   []X   []XI   []XII     [x] perennial   Day time      [x] morning   [] noon      [] evening        [] night    [] whole day........  []  none  Location/changes    [] inside        [] outside   [] mountains    [] sea     [] others.............   []   none  Triggers, specific     [] animals     [] plants     [] dust              [] others ...........................    []  none  Triggers, others       [] work          [] psyche    [] sport            [] others .............................  []  none  Irritant                [] phys efforts [] smoke    [] heat/cold     [] odors  []others............... []  none    Order for PATCH TESTS  Reason for tests (suspected allergy): recurrent periorbital dermatitis  Known previous allergies: none  Standardized panels  [x] Standard panel (40 tests)  [x] Preservatives & Antimicrobials (31 tests)  [x] Emulsifiers & Additives (25 tests)   [x] Perfumes/Flavours & Plants (25 tests)  [x] Hairdresser panel (12 tests)  [] Rubber Chemicals (22 tests)  [x] Plastics (26 tests)  [] Colorants/Dyes/Food additives (20 tests)  [] Metals (implants/dental) (24 tests)  [] Local anaesthetics/NSAIDs (13 tests)  [] Antibiotics & Antimycotics (14 tests)   [] Corticosteroids (15 tests)   [] Photopatch test (62 tests)   [] others: ...      [x] Patient's own products: .contact lens solutions and old contact lens    DO NOT test if chemical or biological identity is unknown!     always ask from patient the product information and safety sheets (MSDS)       Order for PRICK TESTS    Reason for tests (suspected allergy): atopy?  Known previous allergies: none    Standardized prick panels  [x] Atopic panel (20 tests)  [] Pediatric Panel (12 tests)  [] Milk, Meat, Eggs, Grains (20 tests)   [] Dust, Epithelia, Feathers (10 tests)  [] Fish, Seafood, Shellfish (17 tests)  [] Nuts, Beans (14 tests)  [] Spice, Vegetable, Fruit (17 tests)  [] Pollen Panel = Tree, Grass, Weed (24 tests)  [] Others: ...      [] Patient's own products: ...    DO NOT test if chemical or biological identity is unknown!     always ask from patient the product information and safety sheets (MSDS)     Standardized intradermal tests  [x] Penicillium notatum [x] Aspergillus fumigatus [x]  House dust mites D.far & D. pteron  [] Cat    [] dog  [] Others: ...  [] Bee venom   [] Wasp venom  !!Specific protocol with dilutions!!       Order for Drug allergy tests (prick & Intradermal & patch tests)    [] Penicillin G  [] Ampicillin [] Cefazolin   [] Ceftriaxone   [] Ceftazidime  [x] Bactrim    [] Others: ...  Order for ... as test date    Atopy Screen (Placed Apr 14, 2023)  No Substance Readings (15 min) Evaluation   POS Histamine 1mg/ml ++    NEG NaCl 0.9% -      No Substance Readings (15 min) Evaluation   1 Alternaria alternata (tenuis)  -    2 Cladosporium herbarum -    3 Aspergillus fumigatus -    4 Penicillium notatum -    5 Dermatophagoides pteronyssinus -    6 Dermatophagoides farinae -    7 Dog epithelium (canis spp) -    8 Cat hair (julisa catus) -    9 Cockroach   (Blatella americana & germanica) -    10 Grass mix midwest   (Cecilia, Orchard, Redtop, Dominick) -    11 Josue grass (sorghum halepense) -    12 Weed mix   (common Cocklebur, Lamb s quarters, rough redroot Pigweed, Dock/Sorrel) -    13 Mug wort (artemisia vulgare) -    14 Ragweed giant/short (ambrosia spp) -    15 White birch (Betula papyrifera) -    16 Tree mix 1 (Pecan, Maple BHR, Oak RVW, american [place], black [place]) -    17 Red cedar (juniperus virginia) -    18 Tree mix 2   (white [place], river/red Birch, black [place], common [place], american Elm) -    19 Box elder/Maple mix (acer spp) -    20 [place] shagbark (carya ovata) -           Conclusion: no signs for immediate type reactions      Intradermal Testing (Placed Apr 14, 2023)  No Substance Conc.  Reading (15min)  immediate Papule [mm] / Erythema [mm] Reading   (4 days)  delayed Papule [mm] / Erythema [mm] Remarks   DF Standard Dust Mite - D. Farinae 1:10 -  + 6/6    DP Standard Dust Mite - D. Pteronyssinus 1:10 -  ++ 10/10    A Aspergillus fumigatus  1:10 -   -    P Penicillium notatum 1:10 -   -    Conclusions: no immediate type reactions in IDT. Delayed type reaction to  the house dust mite allergen for about 1 week  ________________________________    RESULTS & EVALUATION of PATCH TESTS    Jul 3, 2023 application of patch tests:    Patch test readings after     [x] 2 days, [] 3 days [x] 4 days, [] 5 days,  Other duration: ...    STANDARD Series                                          # Substance 2 days 4 days remarks     1 Romie Mix [C] - -       2 Colophony - -       3  2-Mercaptobenzothiazole  - -       4 Methylisothiazolinone - -       5 Carba Mix - -       6 Thiuram Mix [A] - -       7 Bisphenol A Epoxy Resin - -       8 V-Uunv-Ezwcvqctfqj-Formaldehyde Resin - -       9 Mercapto Mix [A] - -       10 Black Rubber Mix- PPD [B] - -       11 Potassium Dichromate  -  -       12 Balsam of [place] (Myroxylon Pereirae Resin) - -       13 Nickel Sulphate Hexahydrate - -       14 Mixed Dialkyl Thiourea - -       15 Paraben Mix [B] - -       16 Methyldibromo Glutaronitrile - -       17 Fragrance Mix 8% - -       18 2-Bromo-2-Nitropropane-1,3-Diol (Bronopol) CT - -       19 Lyral - -       20 Tixocortol-21- Pivalate CT - -       21 Diazolidinyl urea (Germall II) - -        22 Methyl Methacrylate - -       23 Cobalt (II) Chloride Hexahydrate - -       24 Fragrance Mix II  - -       25 Compositae Mix - -       26 Benzoyl Peroxide - -       27 Bacitracin - -       28 Formaldehyde - -       29 Methylchloroisothiazolinone / Methylisothiazolinone - -       30 Corticosteroid Mix CT - -       31 Sodium Lauryl Sulfate - -       32 Lanolin Alcohol - -       33 Turpentine - -       34 Cetylstearylalcohol - -       35 Chlorhexidine Dicluconate - -       36 Budesonide - -       37 Imidazolidinyl Urea  - -       38 Ethyl-2 Cyanoacrylate - -       39 Quaternium 15 (Dowicil 200) - -       40 Decyl Glucoside - -     PRESERVATIVES & ANTIMICROBIALS        # Substance 2 days 4 days remarks   41 1  1,2-Benzisothiazoline-3-One, Sodium Salt - -     2  1,3,5-Dennis (2-Hydroxyethyl) - Hexahydrotriazine (Frank CRUZ) - -      3 9-Fuctmxvunhbco-9-Nitro-1, 3-Propanediol - -     4  3, 4, 4' - Triclocarban - -    45 5 4 - Chloro - 3 - Cresol - -     6 4 - Chloro - 4 - Xylenol (PCMX) - -     7 7-Ethylbicyclooxazolidine (Bioban [id number]) - -     8 Benzalkonium Chloride CT - -     9 Benzyl Alcohol - -    50 10 Cetalkonium Chloride - -     11 Cetylpyrimidine Chloride  - -     12 Chloroacetamide - -     13 DMDM Hydantoin - -     14 Glutaraldehyde - -    55 15 Triclosan - -     16 Glyoxal Trimeric Dihydrate - -     17 Iodopropynyl Butylcarbamate - -     18 Octylisothiazoline - -     19 Bithionol CT - -    60 20 Bioban P 1487 (Nitrobutyl) Morpholine/(Ethylnitro-Trimethylene) Dimorpholine - -     21 Phenoxyethanol - -     22 Phenyl Salicylate - -     23 Povidone Iodine - -     24 Sodium Benzoate - -    65 25 Sodium Disulfite - -     26 Sorbic Acid - -     27 Thimerosal - -     28 Melamine Formaldehyde Resin - -     29 Ethylenediamine Dihydrochloride - -      Parabens      70 30 Butyl-P-Hydroxybenzoate - -     31 Ethyl-P-Hydroxybenzoate - -     32 Methyl-P-Hydroxybenzoate - -    73 33 Propyl-P-Hydroxybenzoate - -    EMULSIFIERS & ADDITIVES       # Substance 2 days 4 days remarks   74 1 Polyethylene Glycol-400 - -    75 2 Cocamidopropyl Betaine - -     3 Amerchol L101 - -     4 Propylene Glycol - -     5 Triethanolamine - -     6 Sorbitane Sesquiolate CT - -    80 7 Isopropylmyristate - -     8 Polysorbate 80 CT - -     9 Amidoamine   (Stearamidopropyl Dimethylamine) - -     10 Oleamidopropyl Dimethylamine - -     11 Lauryl Glucoside - -    85 12 Coconut Diethanolamide  - -     13 2-Hydroxy-4-Methoxy Benzophenone (Oxybenzone) - -     14 Benzophenone-4 (Sulisobenzon) - -     15 Propolis - -     16 Dexpanthenol - -    90 17 Abitol - -     18 Tert-Butylhydroquinone - -     19 Benzyl Salicylate - -     20 Dimethylaminopropylamin (DMPA) - -     21 Zinc Pyrithione (Zinc Omadine)  - -    95 22 Dennis(Hydroxymethyl) Nitromethane  - -      Antioxidant       23  Dodecyl Gallate - -     24 Butylhydroxyanisole (BHA) - -     25 Butylhydroxytoluene (BHT) - -     26 Di-Alpha-Tocopherol (Vit E) - -    100 27 Propyl Gallate - -    PERFUMES, FLAVORS & PLANTS        # Substance 2 days 4 days remarks   101 1 Benzyl Cinnamate - -     2 Di-Limonene (Dipentene) - -     3 Cananga Odorata (Elyssa Bergeron) (I) - -     4 Lichen Acid Mix - -    105 5 Mentha Piperita Oil (Peppermint Oil) - -     6 Sesquiterpenelactone mix - -     7 Tea Tree Oil, Oxidized - -     8 Wood Tar Mix - -     9 Abietic Acid - -    110 10 Lavendula Angustifolia Oil (Lavender Oil) - -     11 Fragrance mix II CT * 14% - -      Fragrance Mix I       12 [place] Absolute - -     13 Eugenol - -     14 Geraniol - -    115 15 Hydroxycitronellal - -     16 Isoeugenol - -     17 Cinnamic Aldehyde - -     18 Cinnamic Alcohol  - -      Fragrance mix II       19 Citronellol - -    120 20 Alpha-Hexylcinnamic Aldehyde    - -     21 Citral - -     22 Farnesol - -    123 23 Coumarin - -      Hexylcinnamic aldehyde, Coumarin, Farnesol, Hydroxyisohexy3-cyclohexene carboxaldehyde, citral, citrolellol  HAIRDRESSER        # Substance 2 days 4 days remarks   124 1 P-Phenylenediamine -  -    125 2 P-Toluenediamine Sulphate  -  -     3 Ammonium Thioglycolate - -     4 Ammonium Persulfate - -     5 Resorcinol - -     6 3-Aminophenol - -    130 7 P-Aminophenol - -     8 Glyceryl Monothioglycolate - -    132 9 Pyrogallol - -    PLASTICS (Acrylates/Epoxy Systems)       # Substance 2 days 4 days remarks     Acrylates - -    133 1 2-Hydroxyethyl Methacrylate (HEMA) - -     2 1,4-Butandioldimethacrylate (BUDMA) - -    135 3  2-Ethylhexyl Acrylate - -     4 Bisphenol-A-Dimethacrylate  - -     5 Diurethane-Dimethacrylate - -     6 Ethyleneglycoldimethacrylate (EGDMA) - -     7 Pentaerythritoltriacrylate (JACINDA) - -    140 8 Triethylene Glycol Dimethacrylate (TEGDMA) - -      Synthetic material/additives        9 C-Hudh-Shnjojohaxv - -     10 Tricresyl  "Phosphate - -     11 1-Spzv-Deorgnwaadkqw - -     12 Dioctyl phtalate(DEHP,DOP) / (Dimethylhexylphthalate)  - -    145 13 Dibutylphthalate - -     14 Dimethylphthalate - -     15 Toluene-2,4-Diisocyanate - -     16 Diphenylmethane-4,4''-Diisocyanate NA NA      EPOXY RESIN SYSTEMS        Reactive Solvents - -     17 Cresyl Glycidyl Ether NA NA    150 18 Butyl Glycidyl Ether - -     19 Phenyl Glycidyl Ether - -     20 1,4-Butanediol Diglycidyl Ether - -     21 1,6-Hexanediole Diglycidyl Ether - -      Hardener / Accelerator - -     22 Triethylenetetramine - -    155 23 Diethylenetriamine - -     24 Isophorone Diamine (IPD) - -     25 N,N-Dimethyl-P-Toluidine - -    158 26 Isobornyl Acrylate - -    OTHER PRODUCTS  contact lens solutions and old contact lens        # Substance Conc  % solv 2 days 4 days remarks   159 1 Contact lens solution As is       160 2 Contact lens  As is         Patients own products:  è DO NOT test if chemical or biological identity is unknown!   - always ask from patient the product information and safety sheets and consult with the physician   - check neutral pH with pH indicator paper (do not test pH below 5 or over 8 or consult with physician)  - leave-on cosmetics can be tested \"as is\"  - rinse-off products have to be diluted with water, buffer, olive oil or paraffin (discuss with physician)  à remember:   - non-specific toxic/corrosive or biological reactions can occur  - tests with patients own products are not standardized and test conditions are not optimized for patch tests. Whenever possible test with standardized test series and correlate use of product with the result of the patch tests  - if not sure if compounds can be tested under occlusion in patch tests consider open application tests    Results of patch tests:                         Interpretation:  - Negative                    A    = Allergic      (+) Erythema    TI   = Toxic/irritant   + E + Infiltration    RaP = Relevance " at Present     ++ E/I + Papulovesicle   Rpr  = Relevance Previously     +++ E/I/P + Blister     nR   = No Relevance    DRUG ALLERGY TEST SERIES Jul 3, 2023    No Substance Readings (15min) Evaluation   POS Histamine 1mg/ml ++    NEG NaCl 0.9% -      ANTIBIOTICS    Prick Tests         Substance/ Allergen Conc Result (20 min) Remarks    Bactrim 80/400 mg/ml -       Intradermal Tests   immed immed delay delay      Substance Conc 1st dil  2nd dil  2 days  4 days remarks    Bactrim 1:100  -          Patch Tests  as is as is 1:2 1:2     As Is  Vas Substance Conc 2 days 4 days 2 days 4 days remarks   161 162 Bactrim 80/400 mg/ml          [] No relevant allergic reaction observed    [] Allergic reaction diagnosed against following allergens:      Interpretation/ remarks:   See later    [] Patient information given   [] ACDS CAMP information's (# ....) to following compounds: .....   [] General information's to following compounds: ......      Assessment & Plan:    ==> Final Diagnosis:     # chronic recurrent periorbital dermatitis   DDx atopic dermatitis and/or allergic contact dermatitis  * chronic illness with exacerbation, progression, side effects from treatment    # atopic predisposition with    Sometimes morning rhinitis with delayed type reaction to house dust mites  * stable chronic illness    # delayed type reaction as child to Bactrim and Aspirin  * acute uncomplicated illness    These conclusions are made at the best of one's knowledge and belief based on the provided evidence such as patient's history and allergy test results and they can change over time or can be incomplete because of missing information's.    ==> Treatment Plan:  See above       Procedures Performed: Allergy tests, including patch tests and drug allergy tests    Staff: : provider    Follow-up in Derm-Allergy clinic for 1st readings of patch tests after 2 days and 2nd readings and final conclusions after 4 days    ___________________________    I spent a total of 32 minutes with Barb Vazquez during today s  visit. This time was spent discussing all the individual test results, correlating them to the clinical relevance, counseling the patient and/or coordinating care and performing allergy tests

## 2023-07-03 ENCOUNTER — OFFICE VISIT (OUTPATIENT)
Dept: ALLERGY | Facility: CLINIC | Age: 63
End: 2023-07-03
Payer: COMMERCIAL

## 2023-07-03 DIAGNOSIS — Z91.09 HOUSE DUST MITE ALLERGY: ICD-10-CM

## 2023-07-03 DIAGNOSIS — H01.119 CONTACT DERMATITIS OF EYELID, UNSPECIFIED LATERALITY: Primary | ICD-10-CM

## 2023-07-03 DIAGNOSIS — Z88.9 ATOPY: ICD-10-CM

## 2023-07-03 DIAGNOSIS — Z88.9 DRUG ALLERGY: ICD-10-CM

## 2023-07-03 PROCEDURE — 95018 ALL TSTG PERQ&IQ DRUGS/BIOL: CPT | Performed by: DERMATOLOGY

## 2023-07-03 PROCEDURE — 95044 PATCH/APPLICATION TESTS: CPT | Performed by: DERMATOLOGY

## 2023-07-03 NOTE — LETTER
7/3/2023         RE: Barb Vazquez  [address]  [facility] [address]        Dear Colleague,    Thank you for referring your patient, Barb Vazquez, to the [facility] [place]. Please see a copy of my visit note below.    [facility] Dermato[facility] Note  Office visit  Encounter Date: Jul 3, 2023  ____________________________________________    CC: No chief complaint on file.      HPI:  (Jul 3, 2023)  Ms. Barb Vazquez is a(n) 62 year old female who presents today as a return patient for allergy tests as planned  - Follow-up in Derm-Allergy clinic for allergy tests as planned  - otherwise feeling well in usual state of health    Physical exam:  General: In no acute distress, well-developed, well-nourished  Eyes: no conjunctivitis  ENT: no signs of rhinitis   Pulmonary: no wheezing or coughing  Skin: Focused examination of the skin on test sites was performed = see test results below  No active eczematous skin lesions on tests sites, particularly back    Earlier History and Allergy exams:  (Apr 14, 2023)  - since about 1.5 years patient had recurrent dermatitis of the eyelids --> Tacrolimus ointment helped, but not totally. As soon as she stops it it comes back  - alcohol seems to induce on these locations a flush like reaction  - dies hair every 5 weeks.  - wears contact lenses and uses solutions for contact lenses  - Periorbital hyperpigmentations, now on Tacrolimus no desquamation    Past Medical History:   Patient Active Problem List   Diagnosis     CARDIOVASCULAR SCREENING; LDL GOAL LESS THAN 160     Blepharospasm     Keloid of skin     Osteopenia     Vitamin D deficiency     Colon adenoma, tubular adenoma     No past medical history on file.    Allergies:  Allergies   Allergen Reactions     Asa [Aspirin] Rash     Sulfa Antibiotics Rash       Medications:  Current Outpatient Medications   Medication     atorvastatin (LIPITOR) 10 MG tablet      tacrolimus (PROTOPIC) 0.1 % external ointment     vitamin D3 (CHOLECALCIFEROL) 1.25 MG (07711 UT) capsule     Current Facility-Administered Medications   Medication     [START ON 7/3/2023] sulfamethoxazole-trimethoprim (BACTRIM) 80 mg for allergy testing       Social History:  The patient works as a school nurse. Patient has the following hobbies or non-occupational exposure: nitting, biking    Family History:  Family History   Problem Relation Age of Onset     Neurologic Disorder Mother         pituitary tumor cancer/seizures     Memory loss Mother      Skin Cancer Father      Glaucoma Father      Skin Cancer Brother      Lipids Other        Previous Labs, Allergy Tests, Dermatopathology, Imaging:  none    Referred By: Chioma Garrido MD  [address] [place] [address]     Allergy Tests:    Past Allergy Test    Order for Future Allergy Testing:    [] Outpatient  [] Inpatient: [facility]..../ Bed ....       Skin Atopy (atopic dermatitis) [] Yes   [x] No .........  Contact allergies:   [] Yes   [] No .during marriage time beltline eczema, nothing else. With Eucerin facial dermatitis --> 15-20 years ago  Hand eczema:   [] Yes   [x] No           Leading hand:   [] R   [] L       [] Ambidextrous         Drug allergies:        [x] Yes   [] No  Which?..Aspirin and Bactrim as child --> delayed type rash. Ibuprofen no problems     Urticaria/Angioedema  [x] Yes   [] No .one time [place] a child [place] a farm gen hives.  Food Allergy:  [] Yes   [x] No  which?......  Pets :  [] Yes   [x] No  Which?..no reactions to cat or dog         [x]  Rhinitis   [] Conjunctivitis   [x] Sinusitis (2x)   [] Polyposis   [] Otitis   [] Pharyngitis         []  Postnasal drip    []  none  Operations:   [] Tonsils   [] Septum   [] Sinus   [] Polyposis        [] Asthma bronchiale   [] Coughing      [x]  none  Symptoms (mostly Rhinoconjunctivitis and Asthma) aggravated by:  Season   [] I   [] II   [] III   [] IV   []V   []VI   []VII    []VIII   []IX   []X   []XI   []XII     [x] perennial   Day time      [x] morning   [] noon      [] evening        [] night    [] whole day........  []  none  Location/changes    [] inside        [] outside   [] mountains    [] sea     [] others.............   []  none  Triggers, specific     [] animals     [] plants     [] dust              [] others ...........................    []  none  Triggers, others       [] work          [] psyche    [] sport            [] others .............................  []  none  Irritant                [] phys efforts [] smoke    [] heat/cold     [] odors  []others............... []  none    Order for PATCH TESTS  Reason for tests (suspected allergy): recurrent periorbital dermatitis  Known previous allergies: none  Standardized panels  [x] Standard panel (40 tests)  [x] Preservatives & Antimicrobials (31 tests)  [x] Emulsifiers & Additives (25 tests)   [x] Perfumes/Flavours & Plants (25 tests)  [x] Hairdresser panel (12 tests)  [] Rubber Chemicals (22 tests)  [x] Plastics (26 tests)  [] Colorants/Dyes/Food additives (20 tests)  [] Metals (implants/dental) (24 tests)  [] Local anaesthetics/NSAIDs (13 tests)  [] Antibiotics & Antimycotics (14 tests)   [] Corticosteroids (15 tests)   [] Photopatch test (62 tests)   [] others: ...      [x] Patient's own products: .contact lens solutions and old contact lens    DO NOT test if chemical or biological identity is unknown!     always ask from patient the product information and safety sheets (MSDS)       Order for PRICK TESTS    Reason for tests (suspected allergy): atopy?  Known previous allergies: none    Standardized prick panels  [x] Atopic panel (20 tests)  [] Pediatric Panel (12 tests)  [] Milk, Meat, Eggs, Grains (20 tests)   [] Dust, Epithelia, Feathers (10 tests)  [] Fish, Seafood, Shellfish (17 tests)  [] Nuts, Beans (14 tests)  [] Spice, Vegetable, Fruit (17 tests)  [] Pollen Panel = Tree, Grass, Weed (24 tests)  [] Others:  ...      [] Patient's own products: ...    DO NOT test if chemical or biological identity is unknown!     always ask from patient the product information and safety sheets (MSDS)     Standardized intradermal tests  [x] Penicillium notatum [x] Aspergillus fumigatus [x] House dust mites D.far & D. pteron  [] Cat    [] dog  [] Others: ...  [] Bee venom   [] Wasp venom  !!Specific protocol with dilutions!!       Order for Drug allergy tests (prick & Intradermal & patch tests)    [] Penicillin G  [] Ampicillin [] Cefazolin   [] Ceftriaxone   [] Ceftazidime  [x] Bactrim    [] Others: ...  Order for ... as test date    Atopy Screen (Placed Apr 14, 2023)  No Substance Readings (15 min) Evaluation   POS Histamine 1mg/ml ++    NEG NaCl 0.9% -      No Substance Readings (15 min) Evaluation   1 Alternaria alternata (tenuis)  -    2 Cladosporium herbarum -    3 Aspergillus fumigatus -    4 Penicillium notatum -    5 Dermatophagoides pteronyssinus -    6 Dermatophagoides farinae -    7 Dog epithelium (canis spp) -    8 Cat hair (julisa catus) -    9 Cockroach   (Blatella americana & germanica) -    10 Grass mix midwest   (Cecilia, Orchard, Redtop, Dominick) -    11 Josue grass (sorghum halepense) -    12 Weed mix   (common Cocklebur, Lamb s quarters, rough redroot Pigweed, Dock/Sorrel) -    13 Mug wort (artemisia vulgare) -    14 Ragweed giant/short (ambrosia spp) -    15 White birch (Betula papyrifera) -    16 Tree mix 1 (Pecan, Maple BHR, Oak RVW, american [place], black Irvine) -    17 Red cedar (juniperus virginia) -    18 Tree mix 2   (white Conrado, river/red Birch, black [place], common [place], american Elm) -    19 Box elder/Maple mix (acer spp) -    20 [place] shagbark (carya ovata) -           Conclusion: no signs for immediate type reactions      Intradermal Testing (Placed Apr 14, 2023)  No Substance Conc.  Reading (15min)  immediate Papule [mm] / Erythema [mm] Reading   (4 days)  delayed Papule [mm] / Erythema [mm]  Remarks   DF Standard Dust Mite - D. Farinae 1:10 -  + 6/6    DP Standard Dust Mite - D. Pteronyssinus 1:10 -  ++ 10/10    A Aspergillus fumigatus  1:10 -   -    P Penicillium notatum 1:10 -   -    Conclusions: no immediate type reactions in IDT. Delayed type reaction to the house dust mite allergen for about 1 week  ________________________________    RESULTS & EVALUATION of PATCH TESTS    Jul 3, 2023 application of patch tests:    Patch test readings after     [x] 2 days, [] 3 days [x] 4 days, [] 5 days,  Other duration: ...    STANDARD Series                                          # Substance 2 days 4 days remarks     1 Romie Mix [C] - -       2 Colophony - -       3  2-Mercaptobenzothiazole  - -       4 Methylisothiazolinone - -       5 Carba Mix - -       6 Thiuram Mix [A] - -       7 Bisphenol A Epoxy Resin - -       8 C-Ebvv-Wkbzuuoecfh-Formaldehyde Resin - -       9 Mercapto Mix [A] - -       10 Black Rubber Mix- PPD [B] - -       11 Potassium Dichromate  -  -       12 Balsam of [place] (Myroxylon Pereirae Resin) - -       13 Nickel Sulphate Hexahydrate - -       14 Mixed Dialkyl Thiourea - -       15 Paraben Mix [B] - -       16 Methyldibromo Glutaronitrile - -       17 Fragrance Mix 8% - -       18 2-Bromo-2-Nitropropane-1,3-Diol (Bronopol) CT - -       19 Lyral - -       20 Tixocortol-21- Pivalate CT - -       21 Diazolidinyl urea (Germall II) - -        22 Methyl Methacrylate - -       23 Cobalt (II) Chloride Hexahydrate - -       24 Fragrance Mix II  - -       25 Compositae Mix - -       26 Benzoyl Peroxide - -       27 Bacitracin - -       28 Formaldehyde - -       29 Methylchloroisothiazolinone / Methylisothiazolinone - -       30 Corticosteroid Mix CT - -       31 Sodium Lauryl Sulfate - -       32 Lanolin Alcohol - -       33 Turpentine - -       34 Cetylstearylalcohol - -       35 Chlorhexidine Dicluconate - -       36 Budesonide - -       37 Imidazolidinyl Urea  - -       38 Ethyl-2 Cyanoacrylate -  -       39 Quaternium 15 (Dowicil 200) - -       40 Decyl Glucoside - -     PRESERVATIVES & ANTIMICROBIALS        # Substance 2 days 4 days remarks   41 1  1,2-Benzisothiazoline-3-One, Sodium Salt - -     2  1,3,5-Dennis (2-Hydroxyethyl) - Hexahydrotriazine (Grotan BK) - -     3 9-Rjpreiqdnymzw-0-Nitro-1, 3-Propanediol - -     4  3, 4, 4' - Triclocarban - -    45 5 4 - Chloro - 3 - Cresol - -     6 4 - Chloro - 4 - Xylenol (PCMX) - -     7 7-Ethylbicyclooxazolidine (Bioban PF8349) - -     8 Benzalkonium Chloride CT - -     9 Benzyl Alcohol - -    50 10 Cetalkonium Chloride - -     11 Cetylpyrimidine Chloride  - -     12 Chloroacetamide - -     13 DMDM Hydantoin - -     14 Glutaraldehyde - -    55 15 Triclosan - -     16 Glyoxal Trimeric Dihydrate - -     17 Iodopropynyl Butylcarbamate - -     18 Octylisothiazoline - -     19 Bithionol CT - -    60 20 Bioban P 1487 (Nitrobutyl) Morpholine/(Ethylnitro-Trimethylene) Dimorpholine - -     21 Phenoxyethanol - -     22 Phenyl Salicylate - -     23 Povidone Iodine - -     24 Sodium Benzoate - -    65 25 Sodium Disulfite - -     26 Sorbic Acid - -     27 Thimerosal - -     28 Melamine Formaldehyde Resin - -     29 Ethylenediamine Dihydrochloride - -      Parabens      70 30 Butyl-P-Hydroxybenzoate - -     31 Ethyl-P-Hydroxybenzoate - -     32 Methyl-P-Hydroxybenzoate - -    73 33 Propyl-P-Hydroxybenzoate - -    EMULSIFIERS & ADDITIVES       # Substance 2 days 4 days remarks   74 1 Polyethylene Glycol-400 - -    75 2 Cocamidopropyl Betaine - -     3 Amerchol L101 - -     4 Propylene Glycol - -     5 Triethanolamine - -     6 Sorbitane Sesquiolate CT - -    80 7 Isopropylmyristate - -     8 Polysorbate 80 CT - -     9 Amidoamine   (Stearamidopropyl Dimethylamine) - -     10 Oleamidopropyl Dimethylamine - -     11 Lauryl Glucoside - -    85 12 Coconut Diethanolamide  - -     13 2-Hydroxy-4-Methoxy Benzophenone (Oxybenzone) - -     14 Benzophenone-4 (Sulisobenzon) - -     15  Propolis - -     16 Dexpanthenol - -    90 17 Abitol - -     18 Tert-Butylhydroquinone - -     19 Benzyl Salicylate - -     20 Dimethylaminopropylamin (DMPA) - -     21 Zinc Pyrithione (Zinc Omadine)  - -    95 22 Dennis(Hydroxymethyl) Nitromethane  - -      Antioxidant       23 Dodecyl Gallate - -     24 Butylhydroxyanisole (BHA) - -     25 Butylhydroxytoluene (BHT) - -     26 Di-Alpha-Tocopherol (Vit E) - -    100 27 Propyl Gallate - -    PERFUMES, FLAVORS & PLANTS        # Substance 2 days 4 days remarks   101 1 Benzyl Cinnamate - -     2 Di-Limonene (Dipentene) - -     3 Cananga Odorata (Elyssa Bergeron) (I) - -     4 Lichen Acid Mix - -    105 5 Mentha Piperita Oil (Peppermint Oil) - -     6 Sesquiterpenelactone mix - -     7 Tea Tree Oil, Oxidized - -     8 Wood Tar Mix - -     9 Abietic Acid - -    110 10 Lavendula Angustifolia Oil (Lavender Oil) - -     11 Fragrance mix II CT * 14% - -      Fragrance Mix I       12 Oakmoss Absolute - -     13 Eugenol - -     14 Geraniol - -    115 15 Hydroxycitronellal - -     16 Isoeugenol - -     17 Cinnamic Aldehyde - -     18 Cinnamic Alcohol  - -      Fragrance mix II       19 Citronellol - -    120 20 Alpha-Hexylcinnamic Aldehyde    - -     21 Citral - -     22 Farnesol - -    123 23 Coumarin - -      Hexylcinnamic aldehyde, Coumarin, Farnesol, Hydroxyisohexy3-cyclohexene carboxaldehyde, citral, citrolellol  HAIRDRESSER        # Substance 2 days 4 days remarks   124 1 P-Phenylenediamine -  -    125 2 P-Toluenediamine Sulphate  -  -     3 Ammonium Thioglycolate - -     4 Ammonium Persulfate - -     5 Resorcinol - -     6 3-Aminophenol - -    130 7 P-Aminophenol - -     8 Glyceryl Monothioglycolate - -    132 9 Pyrogallol - -    PLASTICS (Acrylates/Epoxy Systems)       # Substance 2 days 4 days remarks     Acrylates - -    133 1 2-Hydroxyethyl Methacrylate (HEMA) - -     2 1,4-Butandioldimethacrylate (BUDMA) - -    135 3  2-Ethylhexyl Acrylate - -     4  "Bisphenol-A-Dimethacrylate  - -     5 Diurethane-Dimethacrylate - -     6 Ethyleneglycoldimethacrylate (EGDMA) - -     7 Pentaerythritoltriacrylate (JACINDA) - -    140 8 Triethylene Glycol Dimethacrylate (TEGDMA) - -      Synthetic material/additives        9 I-Rzgu-Sugvvstpkul - -     10 Tricresyl Phosphate - -     11 5-Nesa-Kqpsexedjexco - -     12 Dioctyl phtalate(DEHP,DOP) / (Dimethylhexylphthalate)  - -    145 13 Dibutylphthalate - -     14 Dimethylphthalate - -     15 Toluene-2,4-Diisocyanate - -     16 Diphenylmethane-4,4''-Diisocyanate NA NA      EPOXY RESIN SYSTEMS        Reactive Solvents - -     17 Cresyl Glycidyl Ether NA NA    150 18 Butyl Glycidyl Ether - -     19 Phenyl Glycidyl Ether - -     20 1,4-Butanediol Diglycidyl Ether - -     21 1,6-Hexanediole Diglycidyl Ether - -      Hardener / Accelerator - -     22 Triethylenetetramine - -    155 23 Diethylenetriamine - -     24 Isophorone Diamine (IPD) - -     25 N,N-Dimethyl-P-Toluidine - -    158 26 Isobornyl Acrylate - -    OTHER PRODUCTS  contact lens solutions and old contact lens        # Substance Conc  % solv 2 days 4 days remarks   159 1 Contact lens solution As is       160 2 Contact lens  As is         Patients own products:  è DO NOT test if chemical or biological identity is unknown!   - always ask from patient the product information and safety sheets and consult with the physician   - check neutral pH with pH indicator paper (do not test pH below 5 or over 8 or consult with physician)  - leave-on cosmetics can be tested \"as is\"  - rinse-off products have to be diluted with water, buffer, olive oil or paraffin (discuss with physician)  à remember:   - non-specific toxic/corrosive or biological reactions can occur  - tests with patients own products are not standardized and test conditions are not optimized for patch tests. Whenever possible test with standardized test series and correlate use of product with the result of the patch tests  - " if not sure if compounds can be tested under occlusion in patch tests consider open application tests    Results of patch tests:                         Interpretation:  - Negative                    A    = Allergic      (+) Erythema    TI   = Toxic/irritant   + E + Infiltration    RaP = Relevance at Present     ++ E/I + Papulovesicle   Rpr  = Relevance Previously     +++ E/I/P + Blister     nR   = No Relevance    DRUG ALLERGY TEST SERIES Jul 3, 2023    No Substance Readings (15min) Evaluation   POS Histamine 1mg/ml ++    NEG NaCl 0.9% -      ANTIBIOTICS    Prick Tests         Substance/ Allergen Conc Result (20 min) Remarks    Bactrim 80/400 mg/ml -       Intradermal Tests   immed immed delay delay      Substance Conc 1st dil  2nd dil  2 days  4 days remarks    Bactrim 1:100  -          Patch Tests  as is as is 1:2 1:2     As Is  Vas Substance Conc 2 days 4 days 2 days 4 days remarks   161 162 Bactrim 80/400 mg/ml          [] No relevant allergic reaction observed    [] Allergic reaction diagnosed against following allergens:      Interpretation/ remarks:   See later    [] Patient information given   [] ACDS CAMP information's (# ....) to following compounds: .....   [] General information's to following compounds: ......      Assessment & Plan:    ==> Final Diagnosis:     # chronic recurrent periorbital dermatitis   DDx atopic dermatitis and/or allergic contact dermatitis  * chronic illness with exacerbation, progression, side effects from treatment    # atopic predisposition with    Sometimes morning rhinitis with delayed type reaction to house dust mites  * stable chronic illness    # delayed type reaction as child to Bactrim and Aspirin  * acute uncomplicated illness    These conclusions are made at the best of one's knowledge and belief based on the provided evidence such as patient's history and allergy test results and they can change over time or can be incomplete because of missing information's.    ==>  Treatment Plan:  See above       Procedures Performed: Allergy tests, including patch tests and drug allergy tests    Staff: : provider    Follow-up in Derm-Allergy clinic for 1st readings of patch tests after 2 days and 2nd readings and final conclusions after 4 days   ___________________________    I spent a total of 32 minutes with Barb Vazquez during today s  visit. This time was spent discussing all the individual test results, correlating them to the clinical relevance, counseling the patient and/or coordinating care and performing allergy tests             Again, thank you for allowing me to participate in the care of your patient.        Sincerely,        Dannie Beebe MD

## 2023-07-03 NOTE — PATIENT INSTRUCTIONS
House Dust Mite Allergy        The house dust mite is an arachnid about 0.3 mm in size and not visible to the naked eye. There are around 150 species of house dust mites in the world. One mite produces up to 40 fecal droppings a day. One teaspoonful of bedroom dust contains an average of nearly 1000 mites and 250,000 minute droppings.    Causes and triggers of house dust mite allergy  The house dust mite requires a warm, moist environment without light in order to live and reproduce. Our beds are ideal. The mite feeds on human and animal skin scales. The allergen is mainly contained in the mite's feces. The feces contain allergy-triggering constituents which are spread in fine dust, are breathed in and can cause an allergic reaction.    Symptoms  When the allergens come into contact with the mucous membranes in the eyes, nose, mouth and throat, sufferers develop symptoms typical of an allergic cold (allergic rhinitis) or an allergic inflammation of the conjunctiva (allergic conjunctivitis): blocked or runny nose, sneezing, red, itchy eyes. If all of these symptoms are present, then the condition is also known as rhinoconjunctivitis. Often, the upper respiratory tract becomes chronically inflamed, primarily because house dust mites are present all year round.  The symptoms of house dust mite allergy typically occur in the morning and are more frequent in the cold months of the year.    Therapy and treatment  As a first step, mattress, pillows and duvet/comforter should be placed in mite-proof or anti-mite covers, sometimes known as encasings. Alternatively you can use pillows or comforter that can be washed at over 130 F monthly. At the same time, house dust should be minimized. If necessary, the symptoms can be treated with medication, for example antihistamines in the form of nasal sprays, eye drops and tablets. Desensitization/specific immunotherapy (SIT) is recommended for house dust allergy if all the measures  "above are not sufficient.    Tips and tricks:  Keep room temperature at 66-70 F and relative air humidity at a maximum of 50%.  Ideally, thoroughly air your home two to three times a day for 5 to 10 minutes each time.  Wash bed linens in at least 130 F every week.  Remove stuffed animals or freeze them every other week.  Keep ceiling fans off in the bedroom as they can stir up dust mite allergens.  Remove dust from furniture with a damp cloth and regularly wet mop floors.  Do not put pot and hydroponic plants in the bedroom and also avoid putting too many in living areas, as they increase room humidity.  When staying overnight in other accommodations, we recommend taking your own bed linen and the above anti-mite mattress covers with you.  Remove upholstered furniture from the bedroom and consider removing the carpets. Ideally, use sealed parquet or laminate raquel, cork tiles or raquel made of wood, novilon or PVC.  Maybe additionally reduce dust mites in mattress, upholstery, or raquel using hot steam .      Modified from \"House Dust Mite Allergy\" by [employer] [facility].   "

## 2023-07-03 NOTE — NURSING NOTE
Chief Complaint   Patient presents with     Allergy Recheck     Patch day 1     Gretel MONGE RN-BSN  Dermatology Surgery  [phone]

## 2023-07-03 NOTE — PROGRESS NOTES
[facility] Dermato-allergology Note  Office visit  Encounter Date: Jul 5, 2023  ____________________________________________    CC: No chief complaint on file.      HPI:  (Jul 5, 2023)  Ms. Barb Vazquez is a(n) 62 year old female who presents today as a return patient for allergy consultation  - Follow-up in Derm-Allergy clinic for 1st readings of patch tests after 2 days    - otherwise feeling well in usual state of health    Physical exam:  General: In no acute distress, well-developed, well-nourished  Eyes: no conjunctivitis  ENT: no signs of rhinitis   Pulmonary: no wheezing or coughing  Skin:Focused examination of the skin on test sites was performed = see test results below    Earlier History and Allergy exams:  (Jul 3, 2023)  - Follow-up in Derm-Allergy clinic for allergy tests as planned    Earlier History and Allergy exams:  (Apr 14, 2023)  - since about 1.5 years patient had recurrent dermatitis of the eyelids --> Tacrolimus ointment helped, but not totally. As soon as she stops it it comes back  - alcohol seems to induce on these locations a flush like reaction  - dies hair every 5 weeks.  - wears contact lenses and uses solutions for contact lenses  - Periorbital hyperpigmentations, now on Tacrolimus no desquamation    Past Medical History:   Patient Active Problem List   Diagnosis     CARDIOVASCULAR SCREENING; LDL GOAL LESS THAN 160     Blepharospasm     Keloid of skin     Osteopenia     Vitamin D deficiency     Colon adenoma, tubular adenoma     No past medical history on file.    Allergies:  Allergies   Allergen Reactions     Asa [Aspirin] Rash     Sulfa Antibiotics Rash       Medications:  Current Outpatient Medications   Medication     atorvastatin (LIPITOR) 10 MG tablet     tacrolimus (PROTOPIC) 0.1 % external ointment     vitamin D3 (CHOLECALCIFEROL) 1.25 MG (27528 UT) capsule     No current facility-administered medications for this visit.       Social History:  The  patient works as a school nurse. Patient has the following hobbies or non-occupational exposure: nitting, biking    Family History:  Family History   Problem Relation Age of Onset     Neurologic Disorder Mother         pituitary tumor cancer/seizures     Memory loss Mother      Skin Cancer Father      Glaucoma Father      Skin Cancer Brother      Lipids Other        Previous Labs, Allergy Tests, Dermatopathology, Imaging:  none    Referred By: Chioma Garrido MD  [address] [place] [address]     Allergy Tests:    Past Allergy Test    Order for Future Allergy Testing:    [] Outpatient  [] Inpatient: [facility]..../ Bed ....       Skin Atopy (atopic dermatitis) [] Yes   [x] No .........  Contact allergies:   [] Yes   [] No .during marriage time beltline eczema, nothing else. With Eucerin facial dermatitis --> 15-20 years ago  Hand eczema:   [] Yes   [x] No           Leading hand:   [] R   [] L       [] Ambidextrous         Drug allergies:        [x] Yes   [] No  Which?..Aspirin and Bactrim as child --> delayed type rash. Ibuprofen no problems     Urticaria/Angioedema  [x] Yes   [] No .one time [place] a child [place] a farm gen hives.  Food Allergy:  [] Yes   [x] No  which?......  Pets :  [] Yes   [x] No  Which?..no reactions to cat or dog         [x]  Rhinitis   [] Conjunctivitis   [x] Sinusitis (2x)   [] Polyposis   [] Otitis   [] Pharyngitis         []  Postnasal drip    []  none  Operations:   [] Tonsils   [] Septum   [] Sinus   [] Polyposis        [] Asthma bronchiale   [] Coughing      [x]  none  Symptoms (mostly Rhinoconjunctivitis and Asthma) aggravated by:  Season   [] I   [] II   [] III   [] IV   []V   []VI   []VII   []VIII   []IX   []X   []XI   []XII     [x] perennial   Day time      [x] morning   [] noon      [] evening        [] night    [] whole day........  []  none  Location/changes    [] inside        [] outside   [] mountains    [] sea     [] others.............   []  none  Triggers, specific      [] animals     [] plants     [] dust              [] others ...........................    []  none  Triggers, others       [] work          [] psyche    [] sport            [] others .............................  []  none  Irritant                [] phys efforts [] smoke    [] heat/cold     [] odors  []others............... []  none    Order for PATCH TESTS  Reason for tests (suspected allergy): recurrent periorbital dermatitis  Known previous allergies: none  Standardized panels  [x] Standard panel (40 tests)  [x] Preservatives & Antimicrobials (31 tests)  [x] Emulsifiers & Additives (25 tests)   [x] Perfumes/Flavours & Plants (25 tests)  [x] Hairdresser panel (12 tests)  [] Rubber Chemicals (22 tests)  [x] Plastics (26 tests)  [] Colorants/Dyes/Food additives (20 tests)  [] Metals (implants/dental) (24 tests)  [] Local anaesthetics/NSAIDs (13 tests)  [] Antibiotics & Antimycotics (14 tests)   [] Corticosteroids (15 tests)   [] Photopatch test (62 tests)   [] others: ...      [x] Patient's own products: .contact lens solutions and old contact lens    DO NOT test if chemical or biological identity is unknown!     always ask from patient the product information and safety sheets (MSDS)       Order for PRICK TESTS    Reason for tests (suspected allergy): atopy?  Known previous allergies: none    Standardized prick panels  [x] Atopic panel (20 tests)  [] Pediatric Panel (12 tests)  [] Milk, Meat, Eggs, Grains (20 tests)   [] Dust, Epithelia, Feathers (10 tests)  [] Fish, Seafood, Shellfish (17 tests)  [] Nuts, Beans (14 tests)  [] Spice, Vegetable, Fruit (17 tests)  [] Pollen Panel = Tree, Grass, Weed (24 tests)  [] Others: ...      [] Patient's own products: ...    DO NOT test if chemical or biological identity is unknown!     always ask from patient the product information and safety sheets (MSDS)     Standardized intradermal tests  [x] Penicillium notatum [x] Aspergillus fumigatus [x] House dust mites D.far &  D. pteron  [] Cat    [] dog  [] Others: ...  [] Bee venom   [] Wasp venom  !!Specific protocol with dilutions!!       Order for Drug allergy tests (prick & Intradermal & patch tests)    [] Penicillin G  [] Ampicillin [] Cefazolin   [] Ceftriaxone   [] Ceftazidime  [x] Bactrim    [] Others: ...  Order for ... as test date    Atopy Screen (Placed Apr 14, 2023)  No Substance Readings (15 min) Evaluation   POS Histamine 1mg/ml ++    NEG NaCl 0.9% -      No Substance Readings (15 min) Evaluation   1 Alternaria alternata (tenuis)  -    2 Cladosporium herbarum -    3 Aspergillus fumigatus -    4 Penicillium notatum -    5 Dermatophagoides pteronyssinus -    6 Dermatophagoides farinae -    7 Dog epithelium (canis spp) -    8 Cat hair (ujlisa catus) -    9 Cockroach   (Blatella americana & germanica) -    10 Grass mix midwest   (Cecilia, Orchard, Redtop, Dominick) -    11 Josue grass (sorghum halepense) -    12 Weed mix   (common Cocklebur, Lamb s quarters, rough redroot Pigweed, Dock/Sorrel) -    13 Mug wort (artemisia vulgare) -    14 Ragweed giant/short (ambrosia spp) -    15 White birch (Betula papyrifera) -    16 Tree mix 1 (Pecan, Maple BHR, Oak RVW, american [place], black [place]) -    17 Red cedar (juniperus virginia) -    18 Tree mix 2   (white Conrado, river/red Birch, black [place], common Nelson, american Elm) -    19 Box elder/Maple mix (acer spp) -    20 Cortez shagbark (carya ovata) -           Conclusion: no signs for immediate type reactions      Intradermal Testing (Placed Apr 14, 2023)  No Substance Conc.  Reading (15min)  immediate Papule [mm] / Erythema [mm] Reading   (4 days)  delayed Papule [mm] / Erythema [mm] Remarks   DF Standard Dust Mite - D. Farinae 1:10 -  + 6/6    DP Standard Dust Mite - D. Pteronyssinus 1:10 -  ++ 10/10    A Aspergillus fumigatus  1:10 -   -    P Penicillium notatum 1:10 -   -    Conclusions: no immediate type reactions in IDT. Delayed type reaction to the house dust mite  allergen for about 1 week  ________________________________    RESULTS & EVALUATION of PATCH TESTS    Jul 3, 2023 application of patch tests:    Patch test readings after     [x] 2 days, [] 3 days [x] 4 days, [] 5 days,  Other duration: ...    STANDARD Series                                          # Substance 2 days 4 days remarks     1 Romie Mix [C] - -       2 Colophony - -       3  2-Mercaptobenzothiazole  - -       4 Methylisothiazolinone - -       5 Carba Mix - -       6 Thiuram Mix [A] - -       7 Bisphenol A Epoxy Resin - -       8 I-Znce-Lxhwpqlvdot-Formaldehyde Resin - -       9 Mercapto Mix [A] - -       10 Black Rubber Mix- PPD [B] - -       11 Potassium Dichromate  -  -       12 Balsam of [place] (Myroxylon Pereirae Resin) - -       13 Nickel Sulphate Hexahydrate - -       14 Mixed Dialkyl Thiourea - -       15 Paraben Mix [B] - -       16 Methyldibromo Glutaronitrile - -       17 Fragrance Mix 8% - -       18 2-Bromo-2-Nitropropane-1,3-Diol (Bronopol) CT - -       19 Lyral - -       20 Tixocortol-21- Pivalate CT - -       21 Diazolidinyl urea (Germall II) - -        22 Methyl Methacrylate - -       23 Cobalt (II) Chloride Hexahydrate - -       24 Fragrance Mix II  - -       25 Compositae Mix - -       26 Benzoyl Peroxide - -       27 Bacitracin - -       28 Formaldehyde - -       29 Methylchloroisothiazolinone / Methylisothiazolinone - -       30 Corticosteroid Mix CT - -       31 Sodium Lauryl Sulfate - -       32 Lanolin Alcohol - -       33 Turpentine - -       34 Cetylstearylalcohol - -       35 Chlorhexidine Dicluconate - -       36 Budesonide - -       37 Imidazolidinyl Urea  - -       38 Ethyl-2 Cyanoacrylate - -       39 Quaternium 15 (Dowicil 200) - -       40 Decyl Glucoside - -     PRESERVATIVES & ANTIMICROBIALS        # Substance 2 days 4 days remarks   41 1  1,2-Benzisothiazoline-3-One, Sodium Salt - -     2  1,3,5-Dennis (2-Hydroxyethyl) - Hexahydrotriazine (Grotan BK) - -     3  1-Kxkwbcmynculz-9-Nitro-1, 3-Propanediol - -     4  3, 4, 4' - Triclocarban - -    45 5 4 - Chloro - 3 - Cresol - -     6 4 - Chloro - 4 - Xylenol (PCMX) - -     7 7-Ethylbicyclooxazolidine (Bioban [id number]) - -     8 Benzalkonium Chloride CT - -     9 Benzyl Alcohol - -    50 10 Cetalkonium Chloride - -     11 Cetylpyrimidine Chloride  - -     12 Chloroacetamide - -     13 DMDM Hydantoin - -     14 Glutaraldehyde - -    55 15 Triclosan - -     16 Glyoxal Trimeric Dihydrate - -     17 Iodopropynyl Butylcarbamate - -     18 Octylisothiazoline - -     19 Bithionol CT - -    60 20 Bioban P 1487 (Nitrobutyl) Morpholine/(Ethylnitro-Trimethylene) Dimorpholine - -     21 Phenoxyethanol - -     22 Phenyl Salicylate - -     23 Povidone Iodine - -     24 Sodium Benzoate - -    65 25 Sodium Disulfite - -     26 Sorbic Acid - -     27 Thimerosal - -     28 Melamine Formaldehyde Resin - -     29 Ethylenediamine Dihydrochloride - -      Parabens      70 30 Butyl-P-Hydroxybenzoate - -     31 Ethyl-P-Hydroxybenzoate - -     32 Methyl-P-Hydroxybenzoate - -    73 33 Propyl-P-Hydroxybenzoate - -    EMULSIFIERS & ADDITIVES       # Substance 2 days 4 days remarks   74 1 Polyethylene Glycol-400 - -    75 2 Cocamidopropyl Betaine - -     3 Amerchol L101 - -     4 Propylene Glycol - -     5 Triethanolamine - -     6 Sorbitane Sesquiolate CT - -    80 7 Isopropylmyristate - -     8 Polysorbate 80 CT - -     9 Amidoamine   (Stearamidopropyl Dimethylamine) - -     10 Oleamidopropyl Dimethylamine - -     11 Lauryl Glucoside - -    85 12 Coconut Diethanolamide  - -     13 2-Hydroxy-4-Methoxy Benzophenone (Oxybenzone) - -     14 Benzophenone-4 (Sulisobenzon) - -     15 Propolis - -     16 Dexpanthenol - -    90 17 Abitol - -     18 Tert-Butylhydroquinone - -     19 Benzyl Salicylate - -     20 Dimethylaminopropylamin (DMPA) - -     21 Zinc Pyrithione (Zinc Omadine)  - -    95 22 Denins(Hydroxymethyl) Nitromethane  - -      Antioxidant       23  Dodecyl Gallate - -     24 Butylhydroxyanisole (BHA) - -     25 Butylhydroxytoluene (BHT) - -     26 Di-Alpha-Tocopherol (Vit E) - -    100 27 Propyl Gallate - -    PERFUMES, FLAVORS & PLANTS        # Substance 2 days 4 days remarks   101 1 Benzyl Cinnamate - -     2 Di-Limonene (Dipentene) - -     3 Cananga Odorata (Elyssa Bergreon) (I) - -     4 Lichen Acid Mix - -    105 5 Mentha Piperita Oil (Peppermint Oil) - -     6 Sesquiterpenelactone mix - -     7 Tea Tree Oil, Oxidized - -     8 Wood Tar Mix - -     9 Abietic Acid - -    110 10 Lavendula Angustifolia Oil (Lavender Oil) - -     11 Fragrance mix II CT * 14% - -      Fragrance Mix I       12 Oakmoss Absolute - -     13 Eugenol - -     14 Geraniol - -    115 15 Hydroxycitronellal - -     16 Isoeugenol - -     17 Cinnamic Aldehyde - -     18 Cinnamic Alcohol  - -      Fragrance mix II       19 Citronellol - -    120 20 Alpha-Hexylcinnamic Aldehyde    - -     21 Citral - -     22 Farnesol - -    123 23 Coumarin - -      Hexylcinnamic aldehyde, Coumarin, Farnesol, Hydroxyisohexy3-cyclohexene carboxaldehyde, citral, citrolellol  HAIRDRESSER        # Substance 2 days 4 days remarks   124 1 P-Phenylenediamine -  -    125 2 P-Toluenediamine Sulphate  -  -     3 Ammonium Thioglycolate - -     4 Ammonium Persulfate - -     5 Resorcinol - -     6 3-Aminophenol - -    130 7 P-Aminophenol - -     8 Glyceryl Monothioglycolate - -    132 9 Pyrogallol - -    PLASTICS (Acrylates/Epoxy Systems)       # Substance 2 days 4 days remarks     Acrylates - -    133 1 2-Hydroxyethyl Methacrylate (HEMA) - -     2 1,4-Butandioldimethacrylate (BUDMA) - -    135 3  2-Ethylhexyl Acrylate - -     4 Bisphenol-A-Dimethacrylate  - -     5 Diurethane-Dimethacrylate - -     6 Ethyleneglycoldimethacrylate (EGDMA) - -     7 Pentaerythritoltriacrylate (JACINDA) - -    140 8 Triethylene Glycol Dimethacrylate (TEGDMA) - -      Synthetic material/additives        9 T-Vnrx-Pgpjtfrveux - -     10 Tricresyl  Phosphate - -     11 0-Hwkl-Qeccnvmnyzurw - -     12 Dioctyl phtalate(DEHP,DOP) / (Dimethylhexylphthalate)  - -    145 13 Dibutylphthalate - -     14 Dimethylphthalate - -     15 Toluene-2,4-Diisocyanate - -     16 Diphenylmethane-4,4''-Diisocyanate NA NA      EPOXY RESIN SYSTEMS        Reactive Solvents - -     17 Cresyl Glycidyl Ether NA NA    150 18 Butyl Glycidyl Ether - -     19 Phenyl Glycidyl Ether - -     20 1,4-Butanediol Diglycidyl Ether - -     21 1,6-Hexanediole Diglycidyl Ether - -      Hardener / Accelerator - -     22 Triethylenetetramine - -    155 23 Diethylenetriamine - -     24 Isophorone Diamine (IPD) - -     25 N,N-Dimethyl-P-Toluidine - -    158 26 Isobornyl Acrylate - -    OTHER PRODUCTS  contact lens solutions and old contact lens        # Substance Conc  % solv 2 days 4 days remarks   159 1 Contact lens solution As is       160 2 Contact lens  As is           Results of patch tests:                         Interpretation:  - Negative                    A    = Allergic      (+) Erythema    TI   = Toxic/irritant   + E + Infiltration    RaP = Relevance at Present     ++ E/I + Papulovesicle   Rpr  = Relevance Previously     +++ E/I/P + Blister     nR   = No Relevance      DRUG ALLERGY TEST SERIES Jul 3, 2023    No Substance Readings (15min) Evaluation   POS Histamine 1mg/ml ++    NEG NaCl 0.9% -      ANTIBIOTICS    Prick Tests         Substance/ Allergen Conc Result (20 min) Remarks    Bactrim 80/400 mg/ml -       Intradermal Tests   immed immed delay delay      Substance Conc 1st dil  2nd dil  2 days  4 days remarks    Bactrim 1:100  - -         Patch Tests  as is as is 1:2 1:2     As Is  Vas Substance Conc 2 days 4 days 2 days 4 days remarks   161 162 Bactrim 80/400 mg/ml -  -       [x] No relevant allergic reaction observed    [] Allergic reaction diagnosed against following allergens:      Interpretation/ remarks:   See later    [] Patient information given   [] ACDS CAMP information's (#  ....) to following compounds: .....   [] General information's to following compounds: ......      Assessment & Plan:    ==> Final Diagnosis:     # chronic recurrent periorbital dermatitis   DDx atopic dermatitis and/or allergic contact dermatitis  * chronic illness with exacerbation, progression, side effects from treatment    # atopic predisposition with    Sometimes morning rhinitis with delayed type reaction to house dust mites  * stable chronic illness    # delayed type reaction as child to Bactrim and Aspirin  * acute uncomplicated illness    These conclusions are made at the best of one's knowledge and belief based on the provided evidence such as patient's history and allergy test results and they can change over time or can be incomplete because of missing information's.    ==> Treatment Plan:  See above  ___________________________    Staff: : provider    Follow-up in Derm-Allergy clinic for 2nd readings and final conclusions after 4 days   ___________________________    I spent a total of 17 minutes with Barb Vazquez during today s  visit. This time was spent discussing all the individual test results, correlating them to the clinical relevance, counseling the patient and/or coordinating care

## 2023-07-05 ENCOUNTER — OFFICE VISIT (OUTPATIENT)
Dept: ALLERGY | Facility: CLINIC | Age: 63
End: 2023-07-05
Payer: COMMERCIAL

## 2023-07-05 DIAGNOSIS — Z88.9 DRUG ALLERGY: ICD-10-CM

## 2023-07-05 DIAGNOSIS — J30.89 SEASONAL AND PERENNIAL ALLERGIC RHINOCONJUNCTIVITIS: ICD-10-CM

## 2023-07-05 DIAGNOSIS — J30.2 SEASONAL AND PERENNIAL ALLERGIC RHINOCONJUNCTIVITIS: ICD-10-CM

## 2023-07-05 DIAGNOSIS — H10.10 SEASONAL AND PERENNIAL ALLERGIC RHINOCONJUNCTIVITIS: ICD-10-CM

## 2023-07-05 DIAGNOSIS — Z88.9 ATOPY: ICD-10-CM

## 2023-07-05 DIAGNOSIS — H01.119 CONTACT DERMATITIS OF EYELID, UNSPECIFIED LATERALITY: Primary | ICD-10-CM

## 2023-07-05 DIAGNOSIS — Z91.09 HOUSE DUST MITE ALLERGY: ICD-10-CM

## 2023-07-05 PROCEDURE — 99207 PR NO CHARGE LOS: CPT | Performed by: DERMATOLOGY

## 2023-07-05 ASSESSMENT — PAIN SCALES - GENERAL: PAINLEVEL: NO PAIN (0)

## 2023-07-05 NOTE — LETTER
7/5/2023         RE: Barb Vazquez  [address]  [facility] [address]        Dear Colleague,    Thank you for referring your patient, Barb Vazquez, to the [facility] [place]. Please see a copy of my visit note below.    [facility] Dermato-allergology Note  Office visit  Encounter Date: Jul 5, 2023  ____________________________________________    CC: No chief complaint on file.      HPI:  (Jul 5, 2023)  Ms. Barb Vazquez is a(n) 62 year old female who presents today as a return patient for allergy consultation  - Follow-up in Derm-Allergy clinic for 1st readings of patch tests after 2 days    - otherwise feeling well in usual state of health    Physical exam:  General: In no acute distress, well-developed, well-nourished  Eyes: no conjunctivitis  ENT: no signs of rhinitis   Pulmonary: no wheezing or coughing  Skin:Focused examination of the skin on test sites was performed = see test results below    Earlier History and Allergy exams:  (Jul 3, 2023)  - Follow-up in Derm-Allergy clinic for allergy tests as planned    Earlier History and Allergy exams:  (Apr 14, 2023)  - since about 1.5 years patient had recurrent dermatitis of the eyelids --> Tacrolimus ointment helped, but not totally. As soon as she stops it it comes back  - alcohol seems to induce on these locations a flush like reaction  - dies hair every 5 weeks.  - wears contact lenses and uses solutions for contact lenses  - Periorbital hyperpigmentations, now on Tacrolimus no desquamation    Past Medical History:   Patient Active Problem List   Diagnosis     CARDIOVASCULAR SCREENING; LDL GOAL LESS THAN 160     Blepharospasm     Keloid of skin     Osteopenia     Vitamin D deficiency     Colon adenoma, tubular adenoma     No past medical history on file.    Allergies:  Allergies   Allergen Reactions     Asa [Aspirin] Rash     Sulfa Antibiotics Rash       Medications:  Current Outpatient  Medications   Medication     atorvastatin (LIPITOR) 10 MG tablet     tacrolimus (PROTOPIC) 0.1 % external ointment     vitamin D3 (CHOLECALCIFEROL) 1.25 MG (61496 UT) capsule     No current facility-administered medications for this visit.       Social History:  The patient works as a school nurse. Patient has the following hobbies or non-occupational exposure: nitting, biking    Family History:  Family History   Problem Relation Age of Onset     Neurologic Disorder Mother         pituitary tumor cancer/seizures     Memory loss Mother      Skin Cancer Father      Glaucoma Father      Skin Cancer Brother      Lipids Other        Previous Labs, Allergy Tests, Dermatopathology, Imaging:  none    Referred By: Chioma Garrido MD  [address] [place] [address]     Allergy Tests:    Past Allergy Test    Order for Future Allergy Testing:    [] Outpatient  [] Inpatient: [facility]..../ Bed ....       Skin Atopy (atopic dermatitis) [] Yes   [x] No .........  Contact allergies:   [] Yes   [] No .during marriage time beltline eczema, nothing else. With Eucerin facial dermatitis --> 15-20 years ago  Hand eczema:   [] Yes   [x] No           Leading hand:   [] R   [] L       [] Ambidextrous         Drug allergies:        [x] Yes   [] No  Which?..Aspirin and Bactrim as child --> delayed type rash. Ibuprofen no problems     Urticaria/Angioedema  [x] Yes   [] No .one time [place] a child [place] a farm gen hives.  Food Allergy:  [] Yes   [x] No  which?......  Pets :  [] Yes   [x] No  Which?..no reactions to cat or dog         [x]  Rhinitis   [] Conjunctivitis   [x] Sinusitis (2x)   [] Polyposis   [] Otitis   [] Pharyngitis         []  Postnasal drip    []  none  Operations:   [] Tonsils   [] Septum   [] Sinus   [] Polyposis        [] Asthma bronchiale   [] Coughing      [x]  none  Symptoms (mostly Rhinoconjunctivitis and Asthma) aggravated by:  Season   [] I   [] II   [] III   [] IV   []V   []VI   []VII   []VIII   []IX   []X    []XI   []XII     [x] perennial   Day time      [x] morning   [] noon      [] evening        [] night    [] whole day........  []  none  Location/changes    [] inside        [] outside   [] mountains    [] sea     [] others.............   []  none  Triggers, specific     [] animals     [] plants     [] dust              [] others ...........................    []  none  Triggers, others       [] work          [] psyche    [] sport            [] others .............................  []  none  Irritant                [] phys efforts [] smoke    [] heat/cold     [] odors  []others............... []  none    Order for PATCH TESTS  Reason for tests (suspected allergy): recurrent periorbital dermatitis  Known previous allergies: none  Standardized panels  [x] Standard panel (40 tests)  [x] Preservatives & Antimicrobials (31 tests)  [x] Emulsifiers & Additives (25 tests)   [x] Perfumes/Flavours & Plants (25 tests)  [x] Hairdresser panel (12 tests)  [] Rubber Chemicals (22 tests)  [x] Plastics (26 tests)  [] Colorants/Dyes/Food additives (20 tests)  [] Metals (implants/dental) (24 tests)  [] Local anaesthetics/NSAIDs (13 tests)  [] Antibiotics & Antimycotics (14 tests)   [] Corticosteroids (15 tests)   [] Photopatch test (62 tests)   [] others: ...      [x] Patient's own products: .contact lens solutions and old contact lens    DO NOT test if chemical or biological identity is unknown!     always ask from patient the product information and safety sheets (MSDS)       Order for PRICK TESTS    Reason for tests (suspected allergy): atopy?  Known previous allergies: none    Standardized prick panels  [x] Atopic panel (20 tests)  [] Pediatric Panel (12 tests)  [] Milk, Meat, Eggs, Grains (20 tests)   [] Dust, Epithelia, Feathers (10 tests)  [] Fish, Seafood, Shellfish (17 tests)  [] Nuts, Beans (14 tests)  [] Spice, Vegetable, Fruit (17 tests)  [] Pollen Panel = Tree, Grass, Weed (24 tests)  [] Others: ...      [] Patient's own  products: ...    DO NOT test if chemical or biological identity is unknown!     always ask from patient the product information and safety sheets (MSDS)     Standardized intradermal tests  [x] Penicillium notatum [x] Aspergillus fumigatus [x] House dust mites D.far & D. pteron  [] Cat    [] dog  [] Others: ...  [] Bee venom   [] Wasp venom  !!Specific protocol with dilutions!!       Order for Drug allergy tests (prick & Intradermal & patch tests)    [] Penicillin G  [] Ampicillin [] Cefazolin   [] Ceftriaxone   [] Ceftazidime  [x] Bactrim    [] Others: ...  Order for ... as test date    Atopy Screen (Placed Apr 14, 2023)  No Substance Readings (15 min) Evaluation   POS Histamine 1mg/ml ++    NEG NaCl 0.9% -      No Substance Readings (15 min) Evaluation   1 Alternaria alternata (tenuis)  -    2 Cladosporium herbarum -    3 Aspergillus fumigatus -    4 Penicillium notatum -    5 Dermatophagoides pteronyssinus -    6 Dermatophagoides farinae -    7 Dog epithelium (canis spp) -    8 Cat hair (julisa catus) -    9 Cockroach   (Blatella americana & germanica) -    10 Grass mix midwest   (Cecilia, Orchard, Redtop, Dominick) -    11 Josue grass (sorghum halepense) -    12 Weed mix   (common Cocklebur, Lamb s quarters, rough redroot Pigweed, Dock/Sorrel) -    13 Mug wort (artemisia vulgare) -    14 Ragweed giant/short (ambrosia spp) -    15 White birch (Betula papyrifera) -    16 Tree mix 1 (Pecan, Maple BHR, Oak RVW, american [place], black [place]) -    17 Red cedar (juniperus virginia) -    18 Tree mix 2   (white Conrado, river/red Birch, black [place], common [place], american Elm) -    19 Box elder/Maple mix (acer spp) -    20 Stanislaus shagbark (carya ovata) -           Conclusion: no signs for immediate type reactions      Intradermal Testing (Placed Apr 14, 2023)  No Substance Conc.  Reading (15min)  immediate Papule [mm] / Erythema [mm] Reading   (4 days)  delayed Papule [mm] / Erythema [mm] Remarks   DF Standard Dust  Mite - D. Farinae 1:10 -  + 6/6    DP Standard Dust Mite - D. Pteronyssinus 1:10 -  ++ 10/10    A Aspergillus fumigatus  1:10 -   -    P Penicillium notatum 1:10 -   -    Conclusions: no immediate type reactions in IDT. Delayed type reaction to the house dust mite allergen for about 1 week  ________________________________    RESULTS & EVALUATION of PATCH TESTS    Jul 3, 2023 application of patch tests:    Patch test readings after     [x] 2 days, [] 3 days [x] 4 days, [] 5 days,  Other duration: ...    STANDARD Series                                          # Substance 2 days 4 days remarks     1 Romie Mix [C] - -       2 Colophony - -       3  2-Mercaptobenzothiazole  - -       4 Methylisothiazolinone - -       5 Carba Mix - -       6 Thiuram Mix [A] - -       7 Bisphenol A Epoxy Resin - -       8 S-Ckbq-Cqueoeswezp-Formaldehyde Resin - -       9 Mercapto Mix [A] - -       10 Black Rubber Mix- PPD [B] - -       11 Potassium Dichromate  -  -       12 Balsam of Peru (Myroxylon Pereirae Resin) - -       13 Nickel Sulphate Hexahydrate - -       14 Mixed Dialkyl Thiourea - -       15 Paraben Mix [B] - -       16 Methyldibromo Glutaronitrile - -       17 Fragrance Mix 8% - -       18 2-Bromo-2-Nitropropane-1,3-Diol (Bronopol) CT - -       19 Lyral - -       20 Tixocortol-21- Pivalate CT - -       21 Diazolidinyl urea (Germall II) - -        22 Methyl Methacrylate - -       23 Cobalt (II) Chloride Hexahydrate - -       24 Fragrance Mix II  - -       25 Compositae Mix - -       26 Benzoyl Peroxide - -       27 Bacitracin - -       28 Formaldehyde - -       29 Methylchloroisothiazolinone / Methylisothiazolinone - -       30 Corticosteroid Mix CT - -       31 Sodium Lauryl Sulfate - -       32 Lanolin Alcohol - -       33 Turpentine - -       34 Cetylstearylalcohol - -       35 Chlorhexidine Dicluconate - -       36 Budesonide - -       37 Imidazolidinyl Urea  - -       38 Ethyl-2 Cyanoacrylate - -       39 Quaternium 15  (Dowicil 200) - -       40 Decyl Glucoside - -     PRESERVATIVES & ANTIMICROBIALS        # Substance 2 days 4 days remarks   41 1  1,2-Benzisothiazoline-3-One, Sodium Salt - -     2  1,3,5-Dennis (2-Hydroxyethyl) - Hexahydrotriazine (Grotan BK) - -     3 2-Kznwrfonkmmee-0-Nitro-1, 3-Propanediol - -     4  3, 4, 4' - Triclocarban - -    45 5 4 - Chloro - 3 - Cresol - -     6 4 - Chloro - 4 - Xylenol (PCMX) - -     7 7-Ethylbicyclooxazolidine (Bioban [id number]) - -     8 Benzalkonium Chloride CT - -     9 Benzyl Alcohol - -    50 10 Cetalkonium Chloride - -     11 Cetylpyrimidine Chloride  - -     12 Chloroacetamide - -     13 DMDM Hydantoin - -     14 Glutaraldehyde - -    55 15 Triclosan - -     16 Glyoxal Trimeric Dihydrate - -     17 Iodopropynyl Butylcarbamate - -     18 Octylisothiazoline - -     19 Bithionol CT - -    60 20 Bioban P 1487 (Nitrobutyl) Morpholine/(Ethylnitro-Trimethylene) Dimorpholine - -     21 Phenoxyethanol - -     22 Phenyl Salicylate - -     23 Povidone Iodine - -     24 Sodium Benzoate - -    65 25 Sodium Disulfite - -     26 Sorbic Acid - -     27 Thimerosal - -     28 Melamine Formaldehyde Resin - -     29 Ethylenediamine Dihydrochloride - -      Parabens      70 30 Butyl-P-Hydroxybenzoate - -     31 Ethyl-P-Hydroxybenzoate - -     32 Methyl-P-Hydroxybenzoate - -    73 33 Propyl-P-Hydroxybenzoate - -    EMULSIFIERS & ADDITIVES       # Substance 2 days 4 days remarks   74 1 Polyethylene Glycol-400 - -    75 2 Cocamidopropyl Betaine - -     3 Amerchol L101 - -     4 Propylene Glycol - -     5 Triethanolamine - -     6 Sorbitane Sesquiolate CT - -    80 7 Isopropylmyristate - -     8 Polysorbate 80 CT - -     9 Amidoamine   (Stearamidopropyl Dimethylamine) - -     10 Oleamidopropyl Dimethylamine - -     11 Lauryl Glucoside - -    85 12 Coconut Diethanolamide  - -     13 2-Hydroxy-4-Methoxy Benzophenone (Oxybenzone) - -     14 Benzophenone-4 (Sulisobenzon) - -     15 Propolis - -     16  Dexpanthenol - -    90 17 Abitol - -     18 Tert-Butylhydroquinone - -     19 Benzyl Salicylate - -     20 Dimethylaminopropylamin (DMPA) - -     21 Zinc Pyrithione (Zinc Omadine)  - -    95 22 Dennis(Hydroxymethyl) Nitromethane  - -      Antioxidant       23 Dodecyl Gallate - -     24 Butylhydroxyanisole (BHA) - -     25 Butylhydroxytoluene (BHT) - -     26 Di-Alpha-Tocopherol (Vit E) - -    100 27 Propyl Gallate - -    PERFUMES, FLAVORS & PLANTS        # Substance 2 days 4 days remarks   101 1 Benzyl Cinnamate - -     2 Di-Limonene (Dipentene) - -     3 Cananga Odorata (Elyssa Bergeron) (I) - -     4 Lichen Acid Mix - -    105 5 Mentha Piperita Oil (Peppermint Oil) - -     6 Sesquiterpenelactone mix - -     7 Tea Tree Oil, Oxidized - -     8 Wood Tar Mix - -     9 Abietic Acid - -    110 10 Lavendula Angustifolia Oil (Lavender Oil) - -     11 Fragrance mix II CT * 14% - -      Fragrance Mix I       12 Oakmoss Absolute - -     13 Eugenol - -     14 Geraniol - -    115 15 Hydroxycitronellal - -     16 Isoeugenol - -     17 Cinnamic Aldehyde - -     18 Cinnamic Alcohol  - -      Fragrance mix II       19 Citronellol - -    120 20 Alpha-Hexylcinnamic Aldehyde    - -     21 Citral - -     22 Farnesol - -    123 23 Coumarin - -      Hexylcinnamic aldehyde, Coumarin, Farnesol, Hydroxyisohexy3-cyclohexene carboxaldehyde, citral, citrolellol  HAIRDRESSER        # Substance 2 days 4 days remarks   124 1 P-Phenylenediamine -  -    125 2 P-Toluenediamine Sulphate  -  -     3 Ammonium Thioglycolate - -     4 Ammonium Persulfate - -     5 Resorcinol - -     6 3-Aminophenol - -    130 7 P-Aminophenol - -     8 Glyceryl Monothioglycolate - -    132 9 Pyrogallol - -    PLASTICS (Acrylates/Epoxy Systems)       # Substance 2 days 4 days remarks     Acrylates - -    133 1 2-Hydroxyethyl Methacrylate (HEMA) - -     2 1,4-Butandioldimethacrylate (BUDMA) - -    135 3  2-Ethylhexyl Acrylate - -     4 Bisphenol-A-Dimethacrylate  - -     5  Diurethane-Dimethacrylate - -     6 Ethyleneglycoldimethacrylate (EGDMA) - -     7 Pentaerythritoltriacrylate (JACINDA) - -    140 8 Triethylene Glycol Dimethacrylate (TEGDMA) - -      Synthetic material/additives        9 B-Stdf-Xayyedjkgpt - -     10 Tricresyl Phosphate - -     11 3-Ddjz-Tfvwqevmgsdbs - -     12 Dioctyl phtalate(DEHP,DOP) / (Dimethylhexylphthalate)  - -    145 13 Dibutylphthalate - -     14 Dimethylphthalate - -     15 Toluene-2,4-Diisocyanate - -     16 Diphenylmethane-4,4''-Diisocyanate NA NA      EPOXY RESIN SYSTEMS        Reactive Solvents - -     17 Cresyl Glycidyl Ether NA NA    150 18 Butyl Glycidyl Ether - -     19 Phenyl Glycidyl Ether - -     20 1,4-Butanediol Diglycidyl Ether - -     21 1,6-Hexanediole Diglycidyl Ether - -      Hardener / Accelerator - -     22 Triethylenetetramine - -    155 23 Diethylenetriamine - -     24 Isophorone Diamine (IPD) - -     25 N,N-Dimethyl-P-Toluidine - -    158 26 Isobornyl Acrylate - -    OTHER PRODUCTS  contact lens solutions and old contact lens        # Substance Conc  % solv 2 days 4 days remarks   159 1 Contact lens solution As is       160 2 Contact lens  As is           Results of patch tests:                         Interpretation:  - Negative                    A    = Allergic      (+) Erythema    TI   = Toxic/irritant   + E + Infiltration    RaP = Relevance at Present     ++ E/I + Papulovesicle   Rpr  = Relevance Previously     +++ E/I/P + Blister     nR   = No Relevance      DRUG ALLERGY TEST SERIES Jul 3, 2023    No Substance Readings (15min) Evaluation   POS Histamine 1mg/ml ++    NEG NaCl 0.9% -      ANTIBIOTICS    Prick Tests         Substance/ Allergen Conc Result (20 min) Remarks    Bactrim 80/400 mg/ml -       Intradermal Tests   immed immed delay delay      Substance Conc 1st dil  2nd dil  2 days  4 days remarks    Bactrim 1:100  - -         Patch Tests  as is as is 1:2 1:2     As Is  Vas Substance Conc 2 days 4 days 2 days 4 days  remarks   161 162 Bactrim 80/400 mg/ml -  -       [x] No relevant allergic reaction observed    [] Allergic reaction diagnosed against following allergens:      Interpretation/ remarks:   See later    [] Patient information given   [] ACDS CAMP information's (# ....) to following compounds: .....   [] General information's to following compounds: ......      Assessment & Plan:    ==> Final Diagnosis:     # chronic recurrent periorbital dermatitis   DDx atopic dermatitis and/or allergic contact dermatitis  * chronic illness with exacerbation, progression, side effects from treatment    # atopic predisposition with    Sometimes morning rhinitis with delayed type reaction to house dust mites  * stable chronic illness    # delayed type reaction as child to Bactrim and Aspirin  * acute uncomplicated illness    These conclusions are made at the best of one's knowledge and belief based on the provided evidence such as patient's history and allergy test results and they can change over time or can be incomplete because of missing information's.    ==> Treatment Plan:  See above  ___________________________    Staff: : provider    Follow-up in Derm-Allergy clinic for 2nd readings and final conclusions after 4 days   ___________________________    I spent a total of 17 minutes with Barb Vazquez during today s  visit. This time was spent discussing all the individual test results, correlating them to the clinical relevance, counseling the patient and/or coordinating care         Again, thank you for allowing me to participate in the care of your patient.        Sincerely,        Dannie Beebe MD

## 2023-07-05 NOTE — NURSING NOTE
Dermatology Rooming Note    Barb Vazquez's goals for this visit include:   Chief Complaint   Patient presents with     Allergy Recheck     Day 3 of patch testing      Georges MOSES CMA

## 2023-07-07 ENCOUNTER — OFFICE VISIT (OUTPATIENT)
Dept: ALLERGY | Facility: CLINIC | Age: 63
End: 2023-07-07
Payer: COMMERCIAL

## 2023-07-07 DIAGNOSIS — H01.119 CONTACT DERMATITIS OF EYELID, UNSPECIFIED LATERALITY: ICD-10-CM

## 2023-07-07 DIAGNOSIS — L20.89 OTHER ATOPIC DERMATITIS: Primary | ICD-10-CM

## 2023-07-07 PROCEDURE — 99214 OFFICE O/P EST MOD 30 MIN: CPT | Performed by: DERMATOLOGY

## 2023-07-07 RX ORDER — TACROLIMUS 1 MG/G
OINTMENT TOPICAL 2 TIMES DAILY
Qty: 30 G | Refills: 4 | Status: SHIPPED | OUTPATIENT
Start: 2023-07-07 | End: 2024-03-01

## 2023-07-07 NOTE — PROGRESS NOTES
[facility] Dermato-allergology Note  Office visit  Encounter Date: Jul 7, 2023  ____________________________________________    CC: Allergy Testing Followup (Patch testing day 5)      HPI:  (Jul 7, 2023)  Ms. Barb Vazquez is a(n) 62 year old female who presents today as a return patient for allergy consultation  - Follow-up in Derm-Allergy clinic for 2nd readings and final conclusions after 4 days   - otherwise feeling well in usual state of health    Physical exam:  General: In no acute distress, well-developed, well-nourished  Eyes: no conjunctivitis  ENT: no signs of rhinitis   Pulmonary: no wheezing or coughing  Skin:Focused examination of the skin on test sites was performed = see test results below    Earlier History and Allergy exams:  (Jul 5, 2023)  - Follow-up in Derm-Allergy clinic for 1st readings of patch tests after 2 days      Earlier History and Allergy exams:  (Jul 3, 2023)  - Follow-up in Derm-Allergy clinic for allergy tests as planned    Earlier History and Allergy exams:  (Apr 14, 2023)  - since about 1.5 years patient had recurrent dermatitis of the eyelids --> Tacrolimus ointment helped, but not totally. As soon as she stops it it comes back  - alcohol seems to induce on these locations a flush like reaction  - dies hair every 5 weeks.  - wears contact lenses and uses solutions for contact lenses  - Periorbital hyperpigmentations, now on Tacrolimus no desquamation    Past Medical History:   Patient Active Problem List   Diagnosis     CARDIOVASCULAR SCREENING; LDL GOAL LESS THAN 160     Blepharospasm     Keloid of skin     Osteopenia     Vitamin D deficiency     Colon adenoma, tubular adenoma     No past medical history on file.    Allergies:  Allergies   Allergen Reactions     Asa [Aspirin] Rash     Sulfa Antibiotics Rash       Medications:  Current Outpatient Medications   Medication     atorvastatin (LIPITOR) 10 MG tablet     tacrolimus (PROTOPIC) 0.1 % external  ointment     vitamin D3 (CHOLECALCIFEROL) 1.25 MG (53716 UT) capsule     No current facility-administered medications for this visit.       Social History:  The patient works as a school nurse. Patient has the following hobbies or non-occupational exposure: nitting, biking    Family History:  Family History   Problem Relation Age of Onset     Neurologic Disorder Mother         pituitary tumor cancer/seizures     Memory loss Mother      Skin Cancer Father      Glaucoma Father      Skin Cancer Brother      Lipids Other        Previous Labs, Allergy Tests, Dermatopathology, Imaging:  none    Referred By: Chioma Garrido MD  [address] [place] [address]     Allergy Tests:    Past Allergy Test    Order for Future Allergy Testing:    [] Outpatient  [] Inpatient: [facility]..../ Bed ....       Skin Atopy (atopic dermatitis) [] Yes   [x] No .........  Contact allergies:   [] Yes   [] No .during marriage time beltline eczema, nothing else. With Eucerin facial dermatitis --> 15-20 years ago  Hand eczema:   [] Yes   [x] No           Leading hand:   [] R   [] L       [] Ambidextrous         Drug allergies:        [x] Yes   [] No  Which?..Aspirin and Bactrim as child --> delayed type rash. Ibuprofen no problems     Urticaria/Angioedema  [x] Yes   [] No .one time as a child on a farm gen hives.  Food Allergy:  [] Yes   [x] No  which?......  Pets :  [] Yes   [x] No  Which?..no reactions to cat or dog         [x]  Rhinitis   [] Conjunctivitis   [x] Sinusitis (2x)   [] Polyposis   [] Otitis   [] Pharyngitis         []  Postnasal drip    []  none  Operations:   [] Tonsils   [] Septum   [] Sinus   [] Polyposis        [] Asthma bronchiale   [] Coughing      [x]  none  Symptoms (mostly Rhinoconjunctivitis and Asthma) aggravated by:  Season   [] I   [] II   [] III   [] IV   []V   []VI   []VII   []VIII   []IX   []X   []XI   []XII     [x] perennial   Day time      [x] morning   [] noon      [] evening        [] night    []  whole day........  []  none  Location/changes    [] inside        [] outside   [] mountains    [] sea     [] others.............   []  none  Triggers, specific     [] animals     [] plants     [] dust              [] others ...........................    []  none  Triggers, others       [] work          [] psyche    [] sport            [] others .............................  []  none  Irritant                [] phys efforts [] smoke    [] heat/cold     [] odors  []others............... []  none    Order for PATCH TESTS  Reason for tests (suspected allergy): recurrent periorbital dermatitis  Known previous allergies: none  Standardized panels  [x] Standard panel (40 tests)  [x] Preservatives & Antimicrobials (31 tests)  [x] Emulsifiers & Additives (25 tests)   [x] Perfumes/Flavours & Plants (25 tests)  [x] Hairdresser panel (12 tests)  [] Rubber Chemicals (22 tests)  [x] Plastics (26 tests)  [] Colorants/Dyes/Food additives (20 tests)  [] Metals (implants/dental) (24 tests)  [] Local anaesthetics/NSAIDs (13 tests)  [] Antibiotics & Antimycotics (14 tests)   [] Corticosteroids (15 tests)   [] Photopatch test (62 tests)   [] others: ...      [x] Patient's own products: .contact lens solutions and old contact lens    DO NOT test if chemical or biological identity is unknown!     always ask from patient the product information and safety sheets (MSDS)       Order for PRICK TESTS    Reason for tests (suspected allergy): atopy?  Known previous allergies: none    Standardized prick panels  [x] Atopic panel (20 tests)  [] Pediatric Panel (12 tests)  [] Milk, Meat, Eggs, Grains (20 tests)   [] Dust, Epithelia, Feathers (10 tests)  [] Fish, Seafood, Shellfish (17 tests)  [] Nuts, Beans (14 tests)  [] Spice, Vegetable, Fruit (17 tests)  [] Pollen Panel = Tree, Grass, Weed (24 tests)  [] Others: ...      [] Patient's own products: ...    DO NOT test if chemical or biological identity is unknown!     always ask from patient the  product information and safety sheets (MSDS)     Standardized intradermal tests  [x] Penicillium notatum [x] Aspergillus fumigatus [x] House dust mites D.far & D. pteron  [] Cat    [] dog  [] Others: ...  [] Bee venom   [] Wasp venom  !!Specific protocol with dilutions!!       Order for Drug allergy tests (prick & Intradermal & patch tests)    [] Penicillin G  [] Ampicillin [] Cefazolin   [] Ceftriaxone   [] Ceftazidime  [x] Bactrim    [] Others: ...  Order for ... as test date    Atopy Screen (Placed Apr 14, 2023)  No Substance Readings (15 min) Evaluation   POS Histamine 1mg/ml ++    NEG NaCl 0.9% -      No Substance Readings (15 min) Evaluation   1 Alternaria alternata (tenuis)  -    2 Cladosporium herbarum -    3 Aspergillus fumigatus -    4 Penicillium notatum -    5 Dermatophagoides pteronyssinus -    6 Dermatophagoides farinae -    7 Dog epithelium (canis spp) -    8 Cat hair (julisa catus) -    9 Cockroach   (Blatella americana & germanica) -    10 Grass mix midwest   (Cecilia, Orchard, Redtop, Dominick) -    11 Josue grass (sorghum halepense) -    12 Weed mix   (common Cocklebur, Lamb s quarters, rough redroot Pigweed, Dock/Sorrel) -    13 Mug wort (artemisia vulgare) -    14 Ragweed giant/short (ambrosia spp) -    15 White birch (Betula papyrifera) -    16 Tree mix 1 (Pecan, Maple BHR, Oak RVW, american Leslie, black Ophelia) -    17 Red cedar (juniperus virginia) -    18 Tree mix 2   (white Conrado, river/red Birch, black Heppner, common Columbus, american Elm) -    19 Box elder/Maple mix (acer spp) -    20 Fairborn shagbark (carya ovata) -           Conclusion: no signs for immediate type reactions      Intradermal Testing (Placed Apr 14, 2023)  No Substance Conc.  Reading (15min)  immediate Papule [mm] / Erythema [mm] Reading   (4 days)  delayed Papule [mm] / Erythema [mm] Remarks   DF Standard Dust Mite - D. Farinae 1:10 -  + 6/6    DP Standard Dust Mite - D. Pteronyssinus 1:10 -  ++ 10/10    A Aspergillus  fumigatus  1:10 -   -    P Penicillium notatum 1:10 -   -    Conclusions: no immediate type reactions in IDT. Delayed type reaction to the house dust mite allergen for about 1 week  ________________________________    RESULTS & EVALUATION of PATCH TESTS    Jul 3, 2023 application of patch tests:    Patch test readings after     [x] 2 days, [] 3 days [x] 4 days, [] 5 days,  Other duration: ...    STANDARD Series                                          # Substance 2 days 4 days remarks     1 Romie Mix [C] - -       2 Colophony - -       3  2-Mercaptobenzothiazole  - -       4 Methylisothiazolinone - -       5 Carba Mix - -       6 Thiuram Mix [A] - -       7 Bisphenol A Epoxy Resin - -       8 L-Sbdi-Wmqfdlvpeoe-Formaldehyde Resin - -       9 Mercapto Mix [A] - -       10 Black Rubber Mix- PPD [B] - -       11 Potassium Dichromate  -  -       12 Balsam of [place] (Myroxylon Pereirae Resin) - -       13 Nickel Sulphate Hexahydrate - -       14 Mixed Dialkyl Thiourea - -       15 Paraben Mix [B] - -       16 Methyldibromo Glutaronitrile - -       17 Fragrance Mix 8% - +       18 2-Bromo-2-Nitropropane-1,3-Diol (Bronopol) CT - -       19 Lyral - -       20 Tixocortol-21- Pivalate CT - -       21 Diazolidinyl urea (Germall II) - -        22 Methyl Methacrylate - -       23 Cobalt (II) Chloride Hexahydrate - -       24 Fragrance Mix II  - -       25 Compositae Mix - -       26 Benzoyl Peroxide - -       27 Bacitracin - -       28 Formaldehyde - -       29 Methylchloroisothiazolinone / Methylisothiazolinone - -       30 Corticosteroid Mix CT - -       31 Sodium Lauryl Sulfate - -       32 Lanolin Alcohol - -       33 Turpentine - -       34 Cetylstearylalcohol - -       35 Chlorhexidine Dicluconate - -       36 Budesonide - -       37 Imidazolidinyl Urea  - -       38 Ethyl-2 Cyanoacrylate - -       39 Quaternium 15 (Dowicil 200) - -       40 Decyl Glucoside - -     PRESERVATIVES & ANTIMICROBIALS        # Substance 2 days 4  days remarks   41 1  1,2-Benzisothiazoline-3-One, Sodium Salt - -     2  1,3,5-Dennis (2-Hydroxyethyl) - Hexahydrotriazine (Grotan BK) - -     3 4-Fknywwclvshaa-2-Nitro-1, 3-Propanediol - -     4  3, 4, 4' - Triclocarban - -    45 5 4 - Chloro - 3 - Cresol - -     6 4 - Chloro - 4 - Xylenol (PCMX) - -     7 7-Ethylbicyclooxazolidine (Bioban [id number]) - -     8 Benzalkonium Chloride CT - -     9 Benzyl Alcohol - -    50 10 Cetalkonium Chloride - -     11 Cetylpyrimidine Chloride  - -     12 Chloroacetamide - -     13 DMDM Hydantoin - -     14 Glutaraldehyde - -    55 15 Triclosan - -     16 Glyoxal Trimeric Dihydrate - -     17 Iodopropynyl Butylcarbamate - ++     18 Octylisothiazoline - -     19 Bithionol CT - -    60 20 Bioban P 1487 (Nitrobutyl) Morpholine/(Ethylnitro-Trimethylene) Dimorpholine - -     21 Phenoxyethanol - -     22 Phenyl Salicylate - -     23 Povidone Iodine - +/++     24 Sodium Benzoate - -    65 25 Sodium Disulfite - -     26 Sorbic Acid - -     27 Thimerosal - -     28 Melamine Formaldehyde Resin - -     29 Ethylenediamine Dihydrochloride - -      Parabens      70 30 Butyl-P-Hydroxybenzoate - -     31 Ethyl-P-Hydroxybenzoate - -     32 Methyl-P-Hydroxybenzoate - -    73 33 Propyl-P-Hydroxybenzoate - -    EMULSIFIERS & ADDITIVES       # Substance 2 days 4 days remarks   74 1 Polyethylene Glycol-400 - -    75 2 Cocamidopropyl Betaine - -     3 Amerchol L101 - -     4 Propylene Glycol - -     5 Triethanolamine - -     6 Sorbitane Sesquiolate CT - -    80 7 Isopropylmyristate - -     8 Polysorbate 80 CT - -     9 Amidoamine   (Stearamidopropyl Dimethylamine) - -     10 Oleamidopropyl Dimethylamine - -     11 Lauryl Glucoside - -    85 12 Coconut Diethanolamide  - -     13 2-Hydroxy-4-Methoxy Benzophenone (Oxybenzone) - -     14 Benzophenone-4 (Sulisobenzon) - -     15 Propolis - -     16 Dexpanthenol - -    90 17 Abitol - -     18 Tert-Butylhydroquinone - -     19 Benzyl Salicylate - -     20  Dimethylaminopropylamin (DMPA) - -     21 Zinc Pyrithione (Zinc Omadine)  - -    95 22 Dennis(Hydroxymethyl) Nitromethane  - -      Antioxidant       23 Dodecyl Gallate - -     24 Butylhydroxyanisole (BHA) - -     25 Butylhydroxytoluene (BHT) - -     26 Di-Alpha-Tocopherol (Vit E) - -    100 27 Propyl Gallate - -    PERFUMES, FLAVORS & PLANTS        # Substance 2 days 4 days remarks   101 1 Benzyl Cinnamate - -     2 Di-Limonene (Dipentene) - -     3 Cananga Odorata (Elyssa Bergeron) (I) - -     4 Lichen Acid Mix - -    105 5 Mentha Piperita Oil (Peppermint Oil) - -     6 Sesquiterpenelactone mix - -     7 Tea Tree Oil, Oxidized - -     8 Wood Tar Mix - +/++     9 Abietic Acid - -    110 10 Lavendula Angustifolia Oil (Lavender Oil) - -     11 Fragrance mix II CT * 14% - +/++      Fragrance Mix I       12 Oakmoss Absolute - -     13 Eugenol - +/++     14 Geraniol - -    115 15 Hydroxycitronellal - -     16 Isoeugenol - +/++     17 Cinnamic Aldehyde - -     18 Cinnamic Alcohol  - -      Fragrance mix II       19 Citronellol - -    120 20 Alpha-Hexylcinnamic Aldehyde    - -     21 Citral - +/++     22 Farnesol - -    123 23 Coumarin - -      Hexylcinnamic aldehyde, Coumarin, Farnesol, Hydroxyisohexy3-cyclohexene carboxaldehyde, citral, citrolellol  HAIRDRESSER        # Substance 2 days 4 days remarks   124 1 P-Phenylenediamine -  -    125 2 P-Toluenediamine Sulphate  -  -     3 Ammonium Thioglycolate - -     4 Ammonium Persulfate - -     5 Resorcinol - -     6 3-Aminophenol - -    130 7 P-Aminophenol - -     8 Glyceryl Monothioglycolate - -    132 9 Pyrogallol - -    PLASTICS (Acrylates/Epoxy Systems)       # Substance 2 days 4 days remarks     Acrylates - -    133 1 2-Hydroxyethyl Methacrylate (HEMA) - -     2 1,4-Butandioldimethacrylate (BUDMA) - -    135 3  2-Ethylhexyl Acrylate - -     4 Bisphenol-A-Dimethacrylate  - -     5 Diurethane-Dimethacrylate - -     6 Ethyleneglycoldimethacrylate (EGDMA) - -     7  Pentaerythritoltriacrylate (JACINDA) - -    140 8 Triethylene Glycol Dimethacrylate (TEGDMA) - -      Synthetic material/additives        9 Z-Uodo-Qspcqcegvqt - -     10 Tricresyl Phosphate - -     11 1-Xjbf-Lcrxktedlujzg - -     12 Dioctyl phtalate(DEHP,DOP) / (Dimethylhexylphthalate)  - -    145 13 Dibutylphthalate - -     14 Dimethylphthalate - -     15 Toluene-2,4-Diisocyanate - -     16 Diphenylmethane-4,4''-Diisocyanate NA NA      EPOXY RESIN SYSTEMS        Reactive Solvents - -     17 Cresyl Glycidyl Ether NA NA    150 18 Butyl Glycidyl Ether - -     19 Phenyl Glycidyl Ether - -     20 1,4-Butanediol Diglycidyl Ether - -     21 1,6-Hexanediole Diglycidyl Ether - -      Hardener / Accelerator - -     22 Triethylenetetramine - -    155 23 Diethylenetriamine - -     24 Isophorone Diamine (IPD) - -     25 N,N-Dimethyl-P-Toluidine - -    158 26 Isobornyl Acrylate - -    OTHER PRODUCTS  contact lens solutions and old contact lens        # Substance Conc  % solv 2 days 4 days remarks   159 1 Contact lens solution As is       160 2 Contact lens  As is           Results of patch tests:                         Interpretation:  - Negative                    A    = Allergic      (+) Erythema    TI   = Toxic/irritant   + E + Infiltration    RaP = Relevance at Present     ++ E/I + Papulovesicle   Rpr  = Relevance Previously     +++ E/I/P + Blister     nR   = No Relevance      DRUG ALLERGY TEST SERIES Jul 3, 2023    No Substance Readings (15min) Evaluation   POS Histamine 1mg/ml ++    NEG NaCl 0.9% -      ANTIBIOTICS    Prick Tests         Substance/ Allergen Conc Result (20 min) Remarks    Bactrim 80/400 mg/ml -       Intradermal Tests   immed immed delay delay      Substance Conc 1st dil  2nd dil  2 days  4 days remarks    Bactrim 1:100  - -         Patch Tests  as is as is 1:2 1:2     As Is  Vas Substance Conc 2 days 4 days 2 days 4 days remarks   161 162 Bactrim 80/400 mg/ml - - - -      [] No relevant allergic reaction  observed    [x] Allergic reaction diagnosed against following allergens:     Preservative: ++ Iodopropynyl Butylcarbamate, +/++ PVP Iodine  Fragrances: +/++ Eugenol, +/++ Isoeugenol +/++ Citral, +/++ fragrance mix I/II, +/++ wood tar mix      Interpretation/ remarks:   Most likely allergic contact dermatitis    [x] Patient information given   [x] ACDS CAMP information's (# [id number], and [id number]) to following compounds:  Iodopropynyl  Butylcarbamate, Eugenol, Isoeugenol, Citral, fragrance mix I/II,  And as advanced search: wood tar mix, PVP Iodine   [] General information's to following compounds: ......      Assessment & Plan:    ==> Final Diagnosis:     # chronic recurrent periorbital dermatitis    >> mostly allergic contact dermatitis with proven sensitization to fragrances and preservative Iodopropynyl Butylcarbamate  > some atopic component with delayed type reaction to dust mites  * chronic illness with exacerbation, progression, side effects from treatment    # atopic predisposition with    Sometimes morning rhinitis with delayed type reaction to house dust mites  * stable chronic illness    # delayed type reaction as child to Bactrim and Aspirin ==> in tests not existent  --> delayed type reaction in patch tests to PVP Iodine  >> in prick, intradermal and patch tests no signs for specific immediate or delayed type reactions to Sulfonamide Bactrim  ==> if patient needs these medications, they can be used. Keep patient 30min under observation in clinic for first, initial dose.   * acute uncomplicated illness    These conclusions are made at the best of one's knowledge and belief based on the provided evidence such as patient's history and allergy test results and they can change over time or can be incomplete because of missing information's.      ==> Treatment Plan:    >> follow the recommendations of the CAMP Pat and use only products from the Pat  >> continue use of Protopic (Tacrolimus) ointment    >>  try HOUSE DUST MITES reduction (info given)  ___________________________    Staff: : provider    Follow-up in Derm-Allergy clinic if necessary (with Dr. Garrido)  ___________________________    I spent a total of 38 minutes with Barb Vazquez during today s  visit. This time was spent discussing all the individual test results, correlating them to the clinical relevance, counseling the patient and/or coordinating care. Moreover time was spent to install and explain the CAMP Pat from [employer] with the informations on the individual allergens and propositions of products that can be used. Please see Assessment and Plan for additional details.

## 2023-07-07 NOTE — NURSING NOTE
Chief Complaint   Patient presents with     Allergy Testing Followup     Patch testing day 5     Lima Bass RN

## 2023-07-07 NOTE — LETTER
7/7/2023         RE: Barb Vazquez  [address]  [facility] [address]        Dear Colleague,    Thank you for referring your patient, Barb Vazquez, to the [facility] [place]. Please see a copy of my visit note below.    [facility] Dermato-allergology Note  Office visit  Encounter Date: Jul 7, 2023  ____________________________________________    CC: Allergy Testing Followup (Patch testing day 5)      HPI:  (Jul 7, 2023)  Ms. Barb Vazquez is a(n) 62 year old female who presents today as a return patient for allergy consultation  - Follow-up in Derm-Allergy clinic for 2nd readings and final conclusions after 4 days   - otherwise feeling well in usual state of health    Physical exam:  General: In no acute distress, well-developed, well-nourished  Eyes: no conjunctivitis  ENT: no signs of rhinitis   Pulmonary: no wheezing or coughing  Skin:Focused examination of the skin on test sites was performed = see test results below    Earlier History and Allergy exams:  (Jul 5, 2023)  - Follow-up in Derm-Allergy clinic for 1st readings of patch tests after 2 days      Earlier History and Allergy exams:  (Jul 3, 2023)  - Follow-up in Derm-Allergy clinic for allergy tests as planned    Earlier History and Allergy exams:  (Apr 14, 2023)  - since about 1.5 years patient had recurrent dermatitis of the eyelids --> Tacrolimus ointment helped, but not totally. As soon as she stops it it comes back  - alcohol seems to induce on these locations a flush like reaction  - dies hair every 5 weeks.  - wears contact lenses and uses solutions for contact lenses  - Periorbital hyperpigmentations, now on Tacrolimus no desquamation    Past Medical History:   Patient Active Problem List   Diagnosis     CARDIOVASCULAR SCREENING; LDL GOAL LESS THAN 160     Blepharospasm     Keloid of skin     Osteopenia     Vitamin D deficiency     Colon adenoma, tubular adenoma     No past  medical history on file.    Allergies:  Allergies   Allergen Reactions     Asa [Aspirin] Rash     Sulfa Antibiotics Rash       Medications:  Current Outpatient Medications   Medication     atorvastatin (LIPITOR) 10 MG tablet     tacrolimus (PROTOPIC) 0.1 % external ointment     vitamin D3 (CHOLECALCIFEROL) 1.25 MG (91058 UT) capsule     No current facility-administered medications for this visit.       Social History:  The patient works as a school nurse. Patient has the following hobbies or non-occupational exposure: nitting, biking    Family History:  Family History   Problem Relation Age of Onset     Neurologic Disorder Mother         pituitary tumor cancer/seizures     Memory loss Mother      Skin Cancer Father      Glaucoma Father      Skin Cancer Brother      Lipids Other        Previous Labs, Allergy Tests, Dermatopathology, Imaging:  none    Referred By: Chioma Garrido MD  [address] [place] [address]     Allergy Tests:    Past Allergy Test    Order for Future Allergy Testing:    [] Outpatient  [] Inpatient: [facility]..../ Bed ....       Skin Atopy (atopic dermatitis) [] Yes   [x] No .........  Contact allergies:   [] Yes   [] No .during marriage time beltline eczema, nothing else. With Eucerin facial dermatitis --> 15-20 years ago  Hand eczema:   [] Yes   [x] No           Leading hand:   [] R   [] L       [] Ambidextrous         Drug allergies:        [x] Yes   [] No  Which?..Aspirin and Bactrim as child --> delayed type rash. Ibuprofen no problems     Urticaria/Angioedema  [x] Yes   [] No .one time as a child on a farm gen hives.  Food Allergy:  [] Yes   [x] No  which?......  Pets :  [] Yes   [x] No  Which?..no reactions to cat or dog         [x]  Rhinitis   [] Conjunctivitis   [x] Sinusitis (2x)   [] Polyposis   [] Otitis   [] Pharyngitis         []  Postnasal drip    []  none  Operations:   [] Tonsils   [] Septum   [] Sinus   [] Polyposis        [] Asthma bronchiale   [] Coughing       [x]  none  Symptoms (mostly Rhinoconjunctivitis and Asthma) aggravated by:  Season   [] I   [] II   [] III   [] IV   []V   []VI   []VII   []VIII   []IX   []X   []XI   []XII     [x] perennial   Day time      [x] morning   [] noon      [] evening        [] night    [] whole day........  []  none  Location/changes    [] inside        [] outside   [] mountains    [] sea     [] others.............   []  none  Triggers, specific     [] animals     [] plants     [] dust              [] others ...........................    []  none  Triggers, others       [] work          [] psyche    [] sport            [] others .............................  []  none  Irritant                [] phys efforts [] smoke    [] heat/cold     [] odors  []others............... []  none    Order for PATCH TESTS  Reason for tests (suspected allergy): recurrent periorbital dermatitis  Known previous allergies: none  Standardized panels  [x] Standard panel (40 tests)  [x] Preservatives & Antimicrobials (31 tests)  [x] Emulsifiers & Additives (25 tests)   [x] Perfumes/Flavours & Plants (25 tests)  [x] Hairdresser panel (12 tests)  [] Rubber Chemicals (22 tests)  [x] Plastics (26 tests)  [] Colorants/Dyes/Food additives (20 tests)  [] Metals (implants/dental) (24 tests)  [] Local anaesthetics/NSAIDs (13 tests)  [] Antibiotics & Antimycotics (14 tests)   [] Corticosteroids (15 tests)   [] Photopatch test (62 tests)   [] others: ...      [x] Patient's own products: .contact lens solutions and old contact lens    DO NOT test if chemical or biological identity is unknown!     always ask from patient the product information and safety sheets (MSDS)       Order for PRICK TESTS    Reason for tests (suspected allergy): atopy?  Known previous allergies: none    Standardized prick panels  [x] Atopic panel (20 tests)  [] Pediatric Panel (12 tests)  [] Milk, Meat, Eggs, Grains (20 tests)   [] Dust, Epithelia, Feathers (10 tests)  [] Fish, Seafood, Shellfish (17  tests)  [] Nuts, Beans (14 tests)  [] Spice, Vegetable, Fruit (17 tests)  [] Pollen Panel = Tree, Grass, Weed (24 tests)  [] Others: ...      [] Patient's own products: ...    DO NOT test if chemical or biological identity is unknown!     always ask from patient the product information and safety sheets (MSDS)     Standardized intradermal tests  [x] Penicillium notatum [x] Aspergillus fumigatus [x] House dust mites D.far & D. pteron  [] Cat    [] dog  [] Others: ...  [] Bee venom   [] Wasp venom  !!Specific protocol with dilutions!!       Order for Drug allergy tests (prick & Intradermal & patch tests)    [] Penicillin G  [] Ampicillin [] Cefazolin   [] Ceftriaxone   [] Ceftazidime  [x] Bactrim    [] Others: ...  Order for ... as test date    Atopy Screen (Placed Apr 14, 2023)  No Substance Readings (15 min) Evaluation   POS Histamine 1mg/ml ++    NEG NaCl 0.9% -      No Substance Readings (15 min) Evaluation   1 Alternaria alternata (tenuis)  -    2 Cladosporium herbarum -    3 Aspergillus fumigatus -    4 Penicillium notatum -    5 Dermatophagoides pteronyssinus -    6 Dermatophagoides farinae -    7 Dog epithelium (canis spp) -    8 Cat hair (julisa catus) -    9 Cockroach   (Blatella americana & germanica) -    10 Grass mix midwest   (Cecilia, Orchard, Redtop, Dominick) -    11 Josue grass (sorghum halepense) -    12 Weed mix   (common Cocklebur, Lamb s quarters, rough redroot Pigweed, Dock/Sorrel) -    13 Mug wort (artemisia vulgare) -    14 Ragweed giant/short (ambrosia spp) -    15 White birch (Betula papyrifera) -    16 Tree mix 1 (Pecan, Maple BHR, Oak RVW, american Artesian, black Frankford) -    17 Red cedar (juniperus virginia) -    18 Tree mix 2   (white Conrado, river/red Birch, black Philadelphia, common Carlisle, american Elm) -    19 Box elder/Maple mix (acer spp) -    20 Bryan shagbark (carya ovata) -           Conclusion: no signs for immediate type reactions      Intradermal Testing (Placed Apr 14, 2023)  No  Substance Conc.  Reading (15min)  immediate Papule [mm] / Erythema [mm] Reading   (4 days)  delayed Papule [mm] / Erythema [mm] Remarks   DF Standard Dust Mite - D. Farinae 1:10 -  + 6/6    DP Standard Dust Mite - D. Pteronyssinus 1:10 -  ++ 10/10    A Aspergillus fumigatus  1:10 -   -    P Penicillium notatum 1:10 -   -    Conclusions: no immediate type reactions in IDT. Delayed type reaction to the house dust mite allergen for about 1 week  ________________________________    RESULTS & EVALUATION of PATCH TESTS    Jul 3, 2023 application of patch tests:    Patch test readings after     [x] 2 days, [] 3 days [x] 4 days, [] 5 days,  Other duration: ...    STANDARD Series                                          # Substance 2 days 4 days remarks     1 Romie Mix [C] - -       2 Colophony - -       3  2-Mercaptobenzothiazole  - -       4 Methylisothiazolinone - -       5 Carba Mix - -       6 Thiuram Mix [A] - -       7 Bisphenol A Epoxy Resin - -       8 X-Kqrv-Ecghehyoxbt-Formaldehyde Resin - -       9 Mercapto Mix [A] - -       10 Black Rubber Mix- PPD [B] - -       11 Potassium Dichromate  -  -       12 Balsam of Peru (Myroxylon Pereirae Resin) - -       13 Nickel Sulphate Hexahydrate - -       14 Mixed Dialkyl Thiourea - -       15 Paraben Mix [B] - -       16 Methyldibromo Glutaronitrile - -       17 Fragrance Mix 8% - +       18 2-Bromo-2-Nitropropane-1,3-Diol (Bronopol) CT - -       19 Lyral - -       20 Tixocortol-21- Pivalate CT - -       21 Diazolidinyl urea (Germall II) - -        22 Methyl Methacrylate - -       23 Cobalt (II) Chloride Hexahydrate - -       24 Fragrance Mix II  - -       25 Compositae Mix - -       26 Benzoyl Peroxide - -       27 Bacitracin - -       28 Formaldehyde - -       29 Methylchloroisothiazolinone / Methylisothiazolinone - -       30 Corticosteroid Mix CT - -       31 Sodium Lauryl Sulfate - -       32 Lanolin Alcohol - -       33 Turpentine - -       34 Cetylstearylalcohol - -        35 Chlorhexidine Dicluconate - -       36 Budesonide - -       37 Imidazolidinyl Urea  - -       38 Ethyl-2 Cyanoacrylate - -       39 Quaternium 15 (Dowicil 200) - -       40 Decyl Glucoside - -     PRESERVATIVES & ANTIMICROBIALS        # Substance 2 days 4 days remarks   41 1  1,2-Benzisothiazoline-3-One, Sodium Salt - -     2  1,3,5-Dennis (2-Hydroxyethyl) - Hexahydrotriazine (Grotan BK) - -     3 6-Gnyzenyasufnj-3-Nitro-1, 3-Propanediol - -     4  3, 4, 4' - Triclocarban - -    45 5 4 - Chloro - 3 - Cresol - -     6 4 - Chloro - 4 - Xylenol (PCMX) - -     7 7-Ethylbicyclooxazolidine (Bioban OF1318) - -     8 Benzalkonium Chloride CT - -     9 Benzyl Alcohol - -    50 10 Cetalkonium Chloride - -     11 Cetylpyrimidine Chloride  - -     12 Chloroacetamide - -     13 DMDM Hydantoin - -     14 Glutaraldehyde - -    55 15 Triclosan - -     16 Glyoxal Trimeric Dihydrate - -     17 Iodopropynyl Butylcarbamate - ++     18 Octylisothiazoline - -     19 Bithionol CT - -    60 20 Bioban P 1487 (Nitrobutyl) Morpholine/(Ethylnitro-Trimethylene) Dimorpholine - -     21 Phenoxyethanol - -     22 Phenyl Salicylate - -     23 Povidone Iodine - +/++     24 Sodium Benzoate - -    65 25 Sodium Disulfite - -     26 Sorbic Acid - -     27 Thimerosal - -     28 Melamine Formaldehyde Resin - -     29 Ethylenediamine Dihydrochloride - -      Parabens      70 30 Butyl-P-Hydroxybenzoate - -     31 Ethyl-P-Hydroxybenzoate - -     32 Methyl-P-Hydroxybenzoate - -    73 33 Propyl-P-Hydroxybenzoate - -    EMULSIFIERS & ADDITIVES       # Substance 2 days 4 days remarks   74 1 Polyethylene Glycol-400 - -    75 2 Cocamidopropyl Betaine - -     3 Amerchol L101 - -     4 Propylene Glycol - -     5 Triethanolamine - -     6 Sorbitane Sesquiolate CT - -    80 7 Isopropylmyristate - -     8 Polysorbate 80 CT - -     9 Amidoamine   (Stearamidopropyl Dimethylamine) - -     10 Oleamidopropyl Dimethylamine - -     11 Lauryl Glucoside - -    85 12  Coconut Diethanolamide  - -     13 2-Hydroxy-4-Methoxy Benzophenone (Oxybenzone) - -     14 Benzophenone-4 (Sulisobenzon) - -     15 Propolis - -     16 Dexpanthenol - -    90 17 Abitol - -     18 Tert-Butylhydroquinone - -     19 Benzyl Salicylate - -     20 Dimethylaminopropylamin (DMPA) - -     21 Zinc Pyrithione (Zinc Omadine)  - -    95 22 Dennis(Hydroxymethyl) Nitromethane  - -      Antioxidant       23 Dodecyl Gallate - -     24 Butylhydroxyanisole (BHA) - -     25 Butylhydroxytoluene (BHT) - -     26 Di-Alpha-Tocopherol (Vit E) - -    100 27 Propyl Gallate - -    PERFUMES, FLAVORS & PLANTS        # Substance 2 days 4 days remarks   101 1 Benzyl Cinnamate - -     2 Di-Limonene (Dipentene) - -     3 Cananga Odorata (Elyssa Bergeron) (I) - -     4 Lichen Acid Mix - -    105 5 Mentha Piperita Oil (Peppermint Oil) - -     6 Sesquiterpenelactone mix - -     7 Tea Tree Oil, Oxidized - -     8 Wood Tar Mix - +/++     9 Abietic Acid - -    110 10 Lavendula Angustifolia Oil (Lavender Oil) - -     11 Fragrance mix II CT * 14% - +/++      Fragrance Mix I       12 Oakmoss Absolute - -     13 Eugenol - +/++     14 Geraniol - -    115 15 Hydroxycitronellal - -     16 Isoeugenol - +/++     17 Cinnamic Aldehyde - -     18 Cinnamic Alcohol  - -      Fragrance mix II       19 Citronellol - -    120 20 Alpha-Hexylcinnamic Aldehyde    - -     21 Citral - +/++     22 Farnesol - -    123 23 Coumarin - -      Hexylcinnamic aldehyde, Coumarin, Farnesol, Hydroxyisohexy3-cyclohexene carboxaldehyde, citral, citrolellol  HAIRDRESSER        # Substance 2 days 4 days remarks   124 1 P-Phenylenediamine -  -    125 2 P-Toluenediamine Sulphate  -  -     3 Ammonium Thioglycolate - -     4 Ammonium Persulfate - -     5 Resorcinol - -     6 3-Aminophenol - -    130 7 P-Aminophenol - -     8 Glyceryl Monothioglycolate - -    132 9 Pyrogallol - -    PLASTICS (Acrylates/Epoxy Systems)       # Substance 2 days 4 days remarks     Acrylates - -    133 1  2-Hydroxyethyl Methacrylate (HEMA) - -     2 1,4-Butandioldimethacrylate (BUDMA) - -    135 3  2-Ethylhexyl Acrylate - -     4 Bisphenol-A-Dimethacrylate  - -     5 Diurethane-Dimethacrylate - -     6 Ethyleneglycoldimethacrylate (EGDMA) - -     7 Pentaerythritoltriacrylate (JACINDA) - -    140 8 Triethylene Glycol Dimethacrylate (TEGDMA) - -      Synthetic material/additives        9 O-Eiey-Jstinzsofdn - -     10 Tricresyl Phosphate - -     11 1-Vaol-Mkyryzymvfyva - -     12 Dioctyl phtalate(DEHP,DOP) / (Dimethylhexylphthalate)  - -    145 13 Dibutylphthalate - -     14 Dimethylphthalate - -     15 Toluene-2,4-Diisocyanate - -     16 Diphenylmethane-4,4''-Diisocyanate NA NA      EPOXY RESIN SYSTEMS        Reactive Solvents - -     17 Cresyl Glycidyl Ether NA NA    150 18 Butyl Glycidyl Ether - -     19 Phenyl Glycidyl Ether - -     20 1,4-Butanediol Diglycidyl Ether - -     21 1,6-Hexanediole Diglycidyl Ether - -      Hardener / Accelerator - -     22 Triethylenetetramine - -    155 23 Diethylenetriamine - -     24 Isophorone Diamine (IPD) - -     25 N,N-Dimethyl-P-Toluidine - -    158 26 Isobornyl Acrylate - -    OTHER PRODUCTS  contact lens solutions and old contact lens        # Substance Conc  % solv 2 days 4 days remarks   159 1 Contact lens solution As is       160 2 Contact lens  As is           Results of patch tests:                         Interpretation:  - Negative                    A    = Allergic      (+) Erythema    TI   = Toxic/irritant   + E + Infiltration    RaP = Relevance at Present     ++ E/I + Papulovesicle   Rpr  = Relevance Previously     +++ E/I/P + Blister     nR   = No Relevance      DRUG ALLERGY TEST SERIES Jul 3, 2023    No Substance Readings (15min) Evaluation   POS Histamine 1mg/ml ++    NEG NaCl 0.9% -      ANTIBIOTICS    Prick Tests         Substance/ Allergen Conc Result (20 min) Remarks    Bactrim 80/400 mg/ml -       Intradermal Tests   immed immed delay delay      Substance Conc  1st dil  2nd dil  2 days  4 days remarks    Bactrim 1:100  - -         Patch Tests  as is as is 1:2 1:2     As Is  Vas Substance Conc 2 days 4 days 2 days 4 days remarks   161 162 Bactrim 80/400 mg/ml - - - -      [] No relevant allergic reaction observed    [x] Allergic reaction diagnosed against following allergens:     Preservative: ++ Iodopropynyl Butylcarbamate, +/++ PVP Iodine  Fragrances: +/++ Eugenol, +/++ Isoeugenol +/++ Citral, +/++ fragrance mix I/II, +/++ wood tar mix      Interpretation/ remarks:   Most likely allergic contact dermatitis    [x] Patient information given   [x] ACDS CAMP information's (# [id number], and [id number]) to following compounds:  Iodopropynyl  Butylcarbamate, Eugenol, Isoeugenol, Citral, fragrance mix I/II,  And as advanced search: wood tar mix, PVP Iodine   [] General information's to following compounds: ......      Assessment & Plan:    ==> Final Diagnosis:     # chronic recurrent periorbital dermatitis    >> mostly allergic contact dermatitis with proven sensitization to fragrances and preservative Iodopropynyl Butylcarbamate  > some atopic component with delayed type reaction to dust mites  * chronic illness with exacerbation, progression, side effects from treatment    # atopic predisposition with    Sometimes morning rhinitis with delayed type reaction to house dust mites  * stable chronic illness    # delayed type reaction as child to Bactrim and Aspirin ==> in tests not existent  --> delayed type reaction in patch tests to PVP Iodine  >> in prick, intradermal and patch tests no signs for specific immediate or delayed type reactions to Sulfonamide Bactrim  ==> if patient needs these medications, they can be used. Keep patient 30min under observation in clinic for first, initial dose.   * acute uncomplicated illness    These conclusions are made at the best of one's knowledge and belief based on the provided evidence such as patient's history and allergy test results and  they can change over time or can be incomplete because of missing information's.      ==> Treatment Plan:    >> follow the recommendations of the CAMP Pat and use only products from the Pat  >> continue use of Protopic (Tacrolimus) ointment    >> try HOUSE DUST MITES reduction (info given)  ___________________________    Staff: : provider    Follow-up in Derm-Allergy clinic if necessary (with Dr. Garrido)  ___________________________    I spent a total of 38 minutes with Barb Vazquez during today s  visit. This time was spent discussing all the individual test results, correlating them to the clinical relevance, counseling the patient and/or coordinating care. Moreover time was spent to install and explain the CAMP Pat from [employer] with the informations on the individual allergens and propositions of products that can be used. Please see Assessment and Plan for additional details.            Again, thank you for allowing me to participate in the care of your patient.        Sincerely,        Dannie Beebe MD

## 2023-07-07 NOTE — PATIENT INSTRUCTIONS
Your patch testing results indicate that you have a contact allergy to Iodopropynyl Butylcarbamate. It is  important that you familiarize yourself with this chemical and take steps to avoid coming in contact with it.  i What is Iodopropynyl Butylcarbamate and where is it found?  Iodopropynyl butylcarbamate (IPBC) is a highly effective fungicide and bactericide. It is used as a preservative  for wood and paints as well as in cosmetic formulations and various consumer products. Further research may identify  additional product or industrial usages of this chemical.  i What else is Iodopropynyl Butylcarbamate called?  This chemical can be identified by different names, including:  3-Iodo-2-propynyl butyl carbamate  Biodocarb C 450  Butyl-3-iodo-2-propynylcarbamate  Carbamic acid, butyl-3-iodo-2-propynyl saurabh  Lodopropynyl butylcarbamate  Troysan Polyphase       House Dust Mite Allergy        The house dust mite is an arachnid about 0.3 mm in size and not visible to the naked eye. There are around 150 species of house dust mites in the world. One mite produces up to 40 fecal droppings a day. One teaspoonful of bedroom dust contains an average of nearly 1000 mites and 250,000 minute droppings.    Causes and triggers of house dust mite allergy  The house dust mite requires a warm, moist environment without light in order to live and reproduce. Our beds are ideal. The mite feeds on human and animal skin scales. The allergen is mainly contained in the mite's feces. The feces contain allergy-triggering constituents which are spread in fine dust, are breathed in and can cause an allergic reaction.    Symptoms  When the allergens come into contact with the mucous membranes in the eyes, nose, mouth and throat, sufferers develop symptoms typical of an allergic cold (allergic rhinitis) or an allergic inflammation of the conjunctiva (allergic conjunctivitis): blocked or runny nose, sneezing, red, itchy eyes. If all of these  "symptoms are present, then the condition is also known as rhinoconjunctivitis. Often, the upper respiratory tract becomes chronically inflamed, primarily because house dust mites are present all year round.  The symptoms of house dust mite allergy typically occur in the morning and are more frequent in the cold months of the year.    Therapy and treatment  As a first step, mattress, pillows and duvet/comforter should be placed in mite-proof or anti-mite covers, sometimes known as encasings. Alternatively you can use pillows or comforter that can be washed at over 130 F monthly. At the same time, house dust should be minimized. If necessary, the symptoms can be treated with medication, for example antihistamines in the form of nasal sprays, eye drops and tablets. Desensitization/specific immunotherapy (SIT) is recommended for house dust allergy if all the measures above are not sufficient.    Tips and tricks:  Keep room temperature at 66-70 F and relative air humidity at a maximum of 50%.  Ideally, thoroughly air your home two to three times a day for 5 to 10 minutes each time.  Wash bed linens in at least 130 F every week.  Remove stuffed animals or freeze them every other week.  Keep ceiling fans off in the bedroom as they can stir up dust mite allergens.  Remove dust from furniture with a damp cloth and regularly wet mop floors.  Do not put pot and hydroponic plants in the bedroom and also avoid putting too many in living areas, as they increase room humidity.  When staying overnight in other accommodations, we recommend taking your own bed linen and the above anti-mite mattress covers with you.  Remove upholstered furniture from the bedroom and consider removing the carpets. Ideally, use sealed parquet or laminate raquel, cork tiles or raquel made of wood, novilon or PVC.  Maybe additionally reduce dust mites in mattress, upholstery, or raquel using hot steam .      Modified from \"House Dust Mite " "Allergy\" by [employer] [facility].     ACDS CAMP information's (# [id number], and [id number]) to following compounds:  Iodopropynyl  Butylcarbamate, Eugenol, Isoeugenol, Citral, fragrance mix I/II,  And as advanced search: wood tar mix, PVP Iodine   [] General information's to following compounds: ......      Assessment & Plan:    ==> Final Diagnosis:     # chronic recurrent periorbital dermatitis    >> mostly allergic contact dermatitis with proven sensitization to fragrances and preservative Iodopropynyl Butylcarbamate  > some atopic component with delayed type reaction to dust mites  * chronic illness with exacerbation, progression, side effects from treatment    # atopic predisposition with  Sometimes morning rhinitis with delayed type reaction to house dust mites  * stable chronic illness    # delayed type reaction as child to Bactrim and Aspirin ==> in tests not existent  --> delayed type reaction in patch tests to PVP Iodine  >> in prick, intradermal and patch tests no signs for specific immediate or delayed type reactions to Sulfonamide Bactrim  ==> if patient needs these medications, they can be used. Keep patient 30min under observation in clinic for first, initial dose.   * acute uncomplicated illness    These conclusions are made at the best of one's knowledge and belief based on the provided evidence such as patient's history and allergy test results and they can change over time or can be incomplete because of missing information's.      ==> Treatment Plan:    >> follow the recommendations of the CAMP Pat and use only products from the Pat  >> continue use of Protopic (Tacrolimus) ointment    >> try HOUSE DUST MITES reduction (info given)  "

## 2023-07-28 ENCOUNTER — TELEPHONE (OUTPATIENT)
Dept: DERMATOLOGY | Facility: CLINIC | Age: 63
End: 2023-07-28
Payer: COMMERCIAL

## 2023-07-28 NOTE — TELEPHONE ENCOUNTER
[facility] Call Center    Phone Message    May a detailed message be left on voicemail: yes     Reason for Call: Appointment Intake    Referring Provider Name: NA  Diagnosis and/or Symptoms: pt is a return pt with Helen Larson MD (Resident). Pt would like to come in to get a red spot on her nose checked. It is not healing and has a hard bump. Pt declined to schedule with any resident providers. Please call pt back to schedule. Per protocols state only schedule return pt from resident providers with other resident providers. Thanks     Action Taken: Message routed to:  [facility] ([facility] Derm    Travel Screening: Not Applicable

## 2023-08-17 ENCOUNTER — OFFICE VISIT (OUTPATIENT)
Dept: DERMATOLOGY | Facility: CLINIC | Age: 63
End: 2023-08-17
Payer: COMMERCIAL

## 2023-08-17 DIAGNOSIS — I78.1 TELANGIECTASIA: ICD-10-CM

## 2023-08-17 DIAGNOSIS — L57.8 ACTINIC SKIN DAMAGE: Primary | ICD-10-CM

## 2023-08-17 DIAGNOSIS — L71.9 ROSACEA: ICD-10-CM

## 2023-08-17 PROCEDURE — 99214 OFFICE O/P EST MOD 30 MIN: CPT | Performed by: STUDENT IN AN ORGANIZED HEALTH CARE EDUCATION/TRAINING PROGRAM

## 2023-08-17 NOTE — LETTER
8/17/2023       RE: Barb Vazquez  [address]  [facility] [address]     Dear Colleague,    Thank you for referring your patient, Barb Vazquez, to the [facility] DERMATOLOGY CLINIC [place] at [facility]. Please see a copy of my visit note below.    Dermatology Rooming Note    Barb Vazquez's goals for this visit include:   Chief Complaint   Patient presents with    Skin Check     Pt has concern about spots on the tip of her nose and left cheek.     Lee Lowery, EMT-B      [facility] Dermatology Note    Encounter Date: Aug 17, 2023    Dermatology Problem List:    ______________________________________    Impression/Plan:  Barb was seen today for skin check.    Diagnoses and all orders for this visit:    Actinic skin damage  Rosacea  Telangiectasia  - discussed sunprotective behaviors, clothing, and the use of sunscreen  - discussed she can apply protopic to any areas of erythema as this is safe to use and can quell mild inflammation, often use for rosacea         Follow-up PRN.       Staff Involved:  Staff Only    Wei Hamm MD   of Dermatology  Department of Dermatology  [facility]      CC:   Chief Complaint   Patient presents with    Skin Check     Pt has concern about spots on the tip of her nose and left cheek.       History of Present Illness:  Ms. Barb Vazquez is a 62 year old female who presents as a return patient.    Patient presents for some concerns about a red spot on her nasal tip as well as her left cheek that were present several weeks ago when she made the appointment.  Since that time the areas have dissipated.  Additionally she does have some contact dermatitis of her eyelids for which she applies Protopic.  She applied the Protopic to these areas of redness and they seem to get better    Labs:      Physical exam:  Vitals: There were no  vitals taken for this visit.  GEN: well developed, well-nourished, in no acute distress, in a pleasant mood.     SKIN: Simon phototype 1  - Sun-exposed skin, which includes the head/face, neck, both arms, digits, and/or nails was examined.   - erythema and telangiectasia involving forehead, cheeks, and chin   - dilated vessels L cheek  - Flat brown macules and patches in a sun exposed areas on face and extremities  - No other lesions of concern on areas examined.     Past Medical History:   History reviewed. No pertinent past medical history.  History reviewed. No pertinent surgical history.    Social History:   reports that she has never smoked. She has never used smokeless tobacco. She reports current alcohol use. She reports that she does not use drugs.    Family History:  Family History   Problem Relation Age of Onset    Neurologic Disorder Mother         pituitary tumor cancer/seizures    Memory loss Mother     Skin Cancer Father     Glaucoma Father     Skin Cancer Brother     Lipids Other        Medications:  Current Outpatient Medications   Medication Sig Dispense Refill    tacrolimus (PROTOPIC) 0.1 % external ointment Apply topically 2 times daily As needed for rash on the eyelids. Can mix in vaseline if it feels too irritating. 30 g 4    vitamin D3 (CHOLECALCIFEROL) 1.25 MG (94499 UT) capsule Take 1 capsule (50,000 Units) by mouth every 7 days 8 capsule 1    atorvastatin (LIPITOR) 10 MG tablet Take 1 tablet (10 mg) by mouth daily (Patient not taking: Reported on 8/17/2023) 90 tablet 3     Allergies   Allergen Reactions    Povidone Iodine Dermatitis     In patch tests some delayed reaction to PVP Iodine    Asa [Aspirin] Rash     According to patients informations rash, without major reactions. If Aspirin is necessary, it can be given. Keep patient in clinic for 30min after first dose and maybe give 1 initial dose and wait 1 week if any delayed reactions

## 2023-08-17 NOTE — PROGRESS NOTES
Dermatology Rooming Note    Barb Vazquez's goals for this visit include:   Chief Complaint   Patient presents with    Skin Check     Pt has concern about spots on the tip of her nose and left cheek.     Lee Lowery, EMT-B

## 2023-08-17 NOTE — PROGRESS NOTES
[facility] Dermatology Note    Encounter Date: Aug 17, 2023    Dermatology Problem List:    ______________________________________    Impression/Plan:  Barb was seen today for skin check.    Diagnoses and all orders for this visit:    Actinic skin damage  Rosacea  Telangiectasia  - discussed sunprotective behaviors, clothing, and the use of sunscreen  - discussed she can apply protopic to any areas of erythema as this is safe to use and can quell mild inflammation, often use for rosacea         Follow-up PRN.       Staff Involved:  Staff Only    Wei Hamm MD   of Dermatology  Department of Dermatology  [facility]      CC:   Chief Complaint   Patient presents with    Skin Check     Pt has concern about spots on the tip of her nose and left cheek.       History of Present Illness:  Ms. Barb Vazquez is a 62 year old female who presents as a return patient.    Patient presents for some concerns about a red spot on her nasal tip as well as her left cheek that were present several weeks ago when she made the appointment.  Since that time the areas have dissipated.  Additionally she does have some contact dermatitis of her eyelids for which she applies Protopic.  She applied the Protopic to these areas of redness and they seem to get better    Labs:      Physical exam:  Vitals: There were no vitals taken for this visit.  GEN: well developed, well-nourished, in no acute distress, in a pleasant mood.     SKIN: Simon phototype 1  - Sun-exposed skin, which includes the head/face, neck, both arms, digits, and/or nails was examined.   - erythema and telangiectasia involving forehead, cheeks, and chin   - dilated vessels L cheek  - Flat brown macules and patches in a sun exposed areas on face and extremities  - No other lesions of concern on areas examined.     Past Medical History:   History reviewed. No pertinent past medical  history.  History reviewed. No pertinent surgical history.    Social History:   reports that she has never smoked. She has never used smokeless tobacco. She reports current alcohol use. She reports that she does not use drugs.    Family History:  Family History   Problem Relation Age of Onset    Neurologic Disorder Mother         pituitary tumor cancer/seizures    Memory loss Mother     Skin Cancer Father     Glaucoma Father     Skin Cancer Brother     Lipids Other        Medications:  Current Outpatient Medications   Medication Sig Dispense Refill    tacrolimus (PROTOPIC) 0.1 % external ointment Apply topically 2 times daily As needed for rash on the eyelids. Can mix in vaseline if it feels too irritating. 30 g 4    vitamin D3 (CHOLECALCIFEROL) 1.25 MG (51055 UT) capsule Take 1 capsule (50,000 Units) by mouth every 7 days 8 capsule 1    atorvastatin (LIPITOR) 10 MG tablet Take 1 tablet (10 mg) by mouth daily (Patient not taking: Reported on 8/17/2023) 90 tablet 3     Allergies   Allergen Reactions    Povidone Iodine Dermatitis     In patch tests some delayed reaction to PVP Iodine    Asa [Aspirin] Rash     According to patients informations rash, without major reactions. If Aspirin is necessary, it can be given. Keep patient in clinic for 30min after first dose and maybe give 1 initial dose and wait 1 week if any delayed reactions

## 2023-10-09 ENCOUNTER — ANCILLARY PROCEDURE (OUTPATIENT)
Dept: MAMMOGRAPHY | Facility: CLINIC | Age: 63
End: 2023-10-09
Payer: COMMERCIAL

## 2023-10-09 DIAGNOSIS — Z12.31 VISIT FOR SCREENING MAMMOGRAM: ICD-10-CM

## 2023-10-09 PROCEDURE — 77067 SCR MAMMO BI INCL CAD: CPT | Mod: GC | Performed by: RADIOLOGY

## 2023-11-30 ENCOUNTER — OFFICE VISIT (OUTPATIENT)
Dept: INTERNAL MEDICINE | Facility: CLINIC | Age: 63
End: 2023-11-30
Payer: COMMERCIAL

## 2023-11-30 VITALS
SYSTOLIC BLOOD PRESSURE: 136 MMHG | BODY MASS INDEX: 26.41 KG/M2 | TEMPERATURE: 97.9 F | OXYGEN SATURATION: 96 % | HEART RATE: 77 BPM | HEIGHT: 64 IN | DIASTOLIC BLOOD PRESSURE: 65 MMHG | WEIGHT: 154.7 LBS

## 2023-11-30 DIAGNOSIS — N76.0 VAGINITIS AND VULVOVAGINITIS: Primary | ICD-10-CM

## 2023-11-30 LAB
CLUE CELLS: ABNORMAL
TRICHOMONAS, WET PREP: ABNORMAL
WBC'S/HIGH POWER FIELD, WET PREP: ABNORMAL
YEAST, WET PREP: ABNORMAL

## 2023-11-30 PROCEDURE — 87210 SMEAR WET MOUNT SALINE/INK: CPT | Performed by: PATHOLOGY

## 2023-11-30 PROCEDURE — 99000 SPECIMEN HANDLING OFFICE-LAB: CPT | Performed by: PATHOLOGY

## 2023-11-30 PROCEDURE — 99213 OFFICE O/P EST LOW 20 MIN: CPT | Mod: GC

## 2023-11-30 PROCEDURE — 87591 N.GONORRHOEAE DNA AMP PROB: CPT | Performed by: INTERNAL MEDICINE

## 2023-11-30 PROCEDURE — 87491 CHLMYD TRACH DNA AMP PROBE: CPT | Performed by: INTERNAL MEDICINE

## 2023-11-30 RX ORDER — METRONIDAZOLE 500 MG/1
500 TABLET ORAL 2 TIMES DAILY
Qty: 14 TABLET | Refills: 0 | Status: CANCELLED | OUTPATIENT
Start: 2023-11-30 | End: 2023-12-07

## 2023-11-30 NOTE — PROGRESS NOTES
"                     PRIMARY CARE CENTER       SUBJECTIVE:  Barb Vazquez is a 63 year old female with a PMHx of osteopenia, colon tubular adenoma who comes in for vaginal itching and discomfort.     Symptoms started 11/20/23 - feels very irritated, pressure. She tried monostat vaginally 11/22 for 4 nights without relief. Started having green discharge, odor. She did a virtual visit and took fluconazole 150mg 11/26, monostat 11/28. Fluconazole may have helped a little bit with discharge. She has had yeast infections and UTI before. No dysuria, urinary frequency. Some urinary urgency but attributes it to the irritation.     1 week before symptoms started, had exercised in the morning, raked for 3 hours, didn't shower. No intercourse for 6-8 years. No rashes. At baseline, no vaginal itching.     Medications and allergies reviewed by me today.     OBJECTIVE:    /65 (BP Location: Right arm, Patient Position: Sitting, Cuff Size: Adult Regular)   Pulse 77   Temp 97.9  F (36.6  C) (Oral)   Ht 1.638 m (5' 4.49\")   Wt 70.2 kg (154 lb 11.2 oz)   SpO2 96%   BMI 26.15 kg/m     Wt Readings from Last 1 Encounters:   11/30/23 70.2 kg (154 lb 11.2 oz)       GENERAL APPEARANCE: healthy, alert and no distress     RESP: lungs clear to auscultation - no rales, rhonchi or wheezes     CV: regular rates and rhythm, normal S1 S2, no murmur     ABDOMEN:  soft, nontender, non-distended     GU_female: Pelvic exam performed with Dr. Gillette. Scant vaginal discharge. Cervix unremarkable.      ASSESSMENT/PLAN:    Barb was seen today for vaginal problem.    Diagnoses and all orders for this visit:    Vaginitis and vulvovaginitis  -     Wet prep - Clinic Collect  -     Multiplex Vaginal Panel by PCR  -     NEISSERIA GONORRHOEA PCR  -     CHLAMYDIA TRACHOMATIS PCR  - Given minimal secretions, pelvic exam, will hold off on order medications until results return.        Griffin Arellano MD  Nov 30, 2023    Pt was seen and plan of care " discussed with Dr. Gillette.

## 2023-11-30 NOTE — PROGRESS NOTES
Barb is a 63 year old that presents in clinic today for the following:     Chief Complaint   Patient presents with    Vaginal Problem     Pt here to discuss vaginal itching and discomfort x1 week; pt reports some greenish discharge today         11/30/2023     1:15 PM   Additional Questions   Roomed by Margaux LAND     Screenings from encounters over the past 10 days    No data recorded     TELMA Almaraz at 1:22 PM on 11/30/2023

## 2023-12-01 LAB
C TRACH DNA SPEC QL NAA+PROBE: NEGATIVE
N GONORRHOEA DNA SPEC QL NAA+PROBE: NEGATIVE

## 2023-12-14 ENCOUNTER — LAB (OUTPATIENT)
Dept: LAB | Facility: CLINIC | Age: 63
End: 2023-12-14
Payer: COMMERCIAL

## 2023-12-14 DIAGNOSIS — R30.0 DYSURIA: ICD-10-CM

## 2023-12-14 LAB
ALBUMIN UR-MCNC: NEGATIVE MG/DL
APPEARANCE UR: ABNORMAL
BACTERIA #/AREA URNS HPF: ABNORMAL /HPF
BILIRUB UR QL STRIP: NEGATIVE
COLOR UR AUTO: ABNORMAL
GLUCOSE UR STRIP-MCNC: NEGATIVE MG/DL
HGB UR QL STRIP: ABNORMAL
KETONES UR STRIP-MCNC: NEGATIVE MG/DL
LEUKOCYTE ESTERASE UR QL STRIP: ABNORMAL
MUCOUS THREADS #/AREA URNS LPF: PRESENT /LPF
NITRATE UR QL: POSITIVE
PH UR STRIP: 5.5 [PH] (ref 5–7)
RBC URINE: 17 /HPF
SP GR UR STRIP: 1.02 (ref 1–1.03)
SQUAMOUS EPITHELIAL: <1 /HPF
UROBILINOGEN UR STRIP-MCNC: NORMAL MG/DL
WBC URINE: 127 /HPF

## 2023-12-14 PROCEDURE — 99000 SPECIMEN HANDLING OFFICE-LAB: CPT | Performed by: PATHOLOGY

## 2023-12-14 PROCEDURE — 87086 URINE CULTURE/COLONY COUNT: CPT | Performed by: NURSE PRACTITIONER

## 2023-12-14 PROCEDURE — 81001 URINALYSIS AUTO W/SCOPE: CPT | Performed by: PATHOLOGY

## 2023-12-15 DIAGNOSIS — N39.0 PYURIA DUE TO BACTERIAL URINARY TRACT INFECTION: Primary | ICD-10-CM

## 2023-12-15 LAB — BACTERIA UR CULT: ABNORMAL

## 2023-12-15 RX ORDER — SULFAMETHOXAZOLE/TRIMETHOPRIM 800-160 MG
1 TABLET ORAL 2 TIMES DAILY
Qty: 10 TABLET | Refills: 0 | Status: SHIPPED | OUTPATIENT
Start: 2023-12-15 | End: 2024-03-01

## 2024-01-09 ENCOUNTER — PATIENT OUTREACH (OUTPATIENT)
Dept: GASTROENTEROLOGY | Facility: CLINIC | Age: 64
End: 2024-01-09
Payer: COMMERCIAL

## 2024-01-27 ENCOUNTER — OFFICE VISIT (OUTPATIENT)
Dept: URGENT CARE | Facility: URGENT CARE | Age: 64
End: 2024-01-27
Payer: COMMERCIAL

## 2024-01-27 VITALS
WEIGHT: 155 LBS | DIASTOLIC BLOOD PRESSURE: 75 MMHG | OXYGEN SATURATION: 99 % | HEART RATE: 84 BPM | HEIGHT: 65 IN | TEMPERATURE: 98.4 F | SYSTOLIC BLOOD PRESSURE: 118 MMHG | BODY MASS INDEX: 25.83 KG/M2

## 2024-01-27 DIAGNOSIS — N30.01 ACUTE CYSTITIS WITH HEMATURIA: Primary | ICD-10-CM

## 2024-01-27 DIAGNOSIS — R39.15 URGENCY OF URINATION: ICD-10-CM

## 2024-01-27 DIAGNOSIS — N89.8 VAGINAL IRRITATION: ICD-10-CM

## 2024-01-27 LAB
ALBUMIN UR-MCNC: NEGATIVE MG/DL
APPEARANCE UR: CLEAR
BACTERIA #/AREA URNS HPF: ABNORMAL /HPF
BILIRUB UR QL STRIP: NEGATIVE
CLUE CELLS: ABNORMAL
COLOR UR AUTO: YELLOW
GLUCOSE UR STRIP-MCNC: NEGATIVE MG/DL
HGB UR QL STRIP: ABNORMAL
KETONES UR STRIP-MCNC: NEGATIVE MG/DL
LEUKOCYTE ESTERASE UR QL STRIP: ABNORMAL
NITRATE UR QL: NEGATIVE
PH UR STRIP: 6.5 [PH] (ref 5–7)
RBC #/AREA URNS AUTO: ABNORMAL /HPF
SP GR UR STRIP: 1.02 (ref 1–1.03)
TRICHOMONAS, WET PREP: ABNORMAL
UROBILINOGEN UR STRIP-ACNC: 0.2 E.U./DL
WBC #/AREA URNS AUTO: ABNORMAL /HPF
WBC'S/HIGH POWER FIELD, WET PREP: ABNORMAL
YEAST, WET PREP: ABNORMAL

## 2024-01-27 PROCEDURE — 87186 SC STD MICRODIL/AGAR DIL: CPT | Performed by: PHYSICIAN ASSISTANT

## 2024-01-27 PROCEDURE — 87086 URINE CULTURE/COLONY COUNT: CPT | Performed by: PHYSICIAN ASSISTANT

## 2024-01-27 PROCEDURE — 2894A WET PREPARATION: CPT | Performed by: PHYSICIAN ASSISTANT

## 2024-01-27 PROCEDURE — 2894A URINE CULTURE: CPT | Performed by: PHYSICIAN ASSISTANT

## 2024-01-27 PROCEDURE — 99214 OFFICE O/P EST MOD 30 MIN: CPT | Performed by: PHYSICIAN ASSISTANT

## 2024-01-27 PROCEDURE — 2894A UA MICROSCOPIC WITH REFLEX TO CULTURE: CPT | Performed by: PHYSICIAN ASSISTANT

## 2024-01-27 PROCEDURE — 87210 SMEAR WET MOUNT SALINE/INK: CPT | Performed by: PHYSICIAN ASSISTANT

## 2024-01-27 PROCEDURE — 81001 URINALYSIS AUTO W/SCOPE: CPT | Performed by: PHYSICIAN ASSISTANT

## 2024-01-27 RX ORDER — CEFDINIR 300 MG/1
300 CAPSULE ORAL 2 TIMES DAILY
Qty: 14 CAPSULE | Refills: 0 | Status: SHIPPED | OUTPATIENT
Start: 2024-01-27 | End: 2024-02-03

## 2024-01-27 NOTE — PROGRESS NOTES
"SUBJECTIVE:   Barb Vazquez is a 63 year old female who  presents today for a possible UTI. Symptoms of dysuria, urgency, frequency, and burning have been going on for 1day(s).  Hematuria no.  sudden onsetand mild.  There is no history of fever, chills, nausea or vomiting.  No history of vaginal or penile discharge. This patient does have a history of urinary tract infections. Patient denies long duration, rigors, flank pain, temperature > 101 degrees F., Vomiting, significant nausea or diarrhea, taking Coumadin, and GFR less than 30 within the last year or vaginal discharge, vaginal itching, and dyspareunia (pain in labia/pelvis)     No past medical history on file.  Current Outpatient Medications   Medication Sig Dispense Refill    sulfamethoxazole-trimethoprim (BACTRIM DS) 800-160 MG tablet Take 1 tablet by mouth 2 times daily (Patient not taking: Reported on 1/27/2024) 10 tablet 0    vitamin D3 (CHOLECALCIFEROL) 1.25 MG (77095 UT) capsule Take 1 capsule (50,000 Units) by mouth every 7 days 8 capsule 1    atorvastatin (LIPITOR) 10 MG tablet Take 1 tablet (10 mg) by mouth daily (Patient not taking: Reported on 8/17/2023) 90 tablet 3    tacrolimus (PROTOPIC) 0.1 % external ointment Apply topically 2 times daily As needed for rash on the eyelids. Can mix in vaseline if it feels too irritating. (Patient not taking: Reported on 1/27/2024) 30 g 4     Social History     Tobacco Use    Smoking status: Never    Smokeless tobacco: Never   Substance Use Topics    Alcohol use: Yes     Comment: very occassional       ROS:   Review of systems negative except as stated above.    OBJECTIVE:  /75   Pulse 84   Temp 98.4  F (36.9  C) (Temporal)   Ht 1.651 m (5' 5\")   Wt 70.3 kg (155 lb)   SpO2 99%   BMI 25.79 kg/m    GENERAL APPEARANCE: healthy, alert and no distress  RESP: lungs clear to auscultation - no rales, rhonchi or wheezes  CV: regular rates and rhythm, normal S1 S2, no murmur noted  BACK: No CVA " tenderness  SKIN: no suspicious lesions or rashes    Results for orders placed or performed in visit on 01/27/24   UA Macroscopic with reflex to Microscopic and Culture - Clinic Collect     Status: Abnormal    Specimen: Urine, Clean Catch   Result Value Ref Range    Color Urine Yellow Colorless, Straw, Light Yellow, Yellow    Appearance Urine Clear Clear    Glucose Urine Negative Negative mg/dL    Bilirubin Urine Negative Negative    Ketones Urine Negative Negative mg/dL    Specific Gravity Urine 1.025 1.003 - 1.035    Blood Urine Small (A) Negative    pH Urine 6.5 5.0 - 7.0    Protein Albumin Urine Negative Negative mg/dL    Urobilinogen Urine 0.2 0.2, 1.0 E.U./dL    Nitrite Urine Negative Negative    Leukocyte Esterase Urine Trace (A) Negative   UA Microscopic with Reflex to Culture     Status: Abnormal   Result Value Ref Range    Bacteria Urine Few (A) None Seen /HPF    RBC Urine 5-10 (A) 0-2 /HPF /HPF    WBC Urine 10-25 (A) 0-5 /HPF /HPF   Wet prep - Clinic Collect     Status: Abnormal    Specimen: Vagina; Swab   Result Value Ref Range    Trichomonas Absent Absent    Yeast Absent Absent    Clue Cells Absent Absent    WBCs/high power field 1+ (A) None       ASSESSMENT:   (N30.01) Acute cystitis with hematuria  (primary encounter diagnosis)  Plan: cefdinir (OMNICEF) 300 MG capsule       (R39.15) Urgency of urination  Plan: UA Macroscopic with reflex to Microscopic and         Culture - Clinic Collect, UA Microscopic with         Reflex to Culture, Urine Culture      (N89.8) Vaginal irritation  Plan: Wet prep - Clinic Collect          Red flags and emergent follow up discussed, and understood by patient  Follow up with PCP if symptoms worsen or fail to improve

## 2024-01-28 LAB — BACTERIA UR CULT: ABNORMAL

## 2024-02-26 ASSESSMENT — ANXIETY QUESTIONNAIRES
2. NOT BEING ABLE TO STOP OR CONTROL WORRYING: NOT AT ALL
5. BEING SO RESTLESS THAT IT IS HARD TO SIT STILL: NOT AT ALL
6. BECOMING EASILY ANNOYED OR IRRITABLE: NOT AT ALL
7. FEELING AFRAID AS IF SOMETHING AWFUL MIGHT HAPPEN: NOT AT ALL
7. FEELING AFRAID AS IF SOMETHING AWFUL MIGHT HAPPEN: NOT AT ALL
4. TROUBLE RELAXING: NOT AT ALL
3. WORRYING TOO MUCH ABOUT DIFFERENT THINGS: NOT AT ALL
1. FEELING NERVOUS, ANXIOUS, OR ON EDGE: NOT AT ALL
GAD7 TOTAL SCORE: 0

## 2024-03-01 ENCOUNTER — OFFICE VISIT (OUTPATIENT)
Dept: OBGYN | Facility: CLINIC | Age: 64
End: 2024-03-01
Attending: OBSTETRICS & GYNECOLOGY
Payer: COMMERCIAL

## 2024-03-01 VITALS
HEART RATE: 62 BPM | SYSTOLIC BLOOD PRESSURE: 100 MMHG | DIASTOLIC BLOOD PRESSURE: 66 MMHG | BODY MASS INDEX: 25.79 KG/M2 | HEIGHT: 65 IN

## 2024-03-01 DIAGNOSIS — N90.5 VULVAR ATROPHY: Primary | ICD-10-CM

## 2024-03-01 DIAGNOSIS — N39.0 RECURRENT UTI: ICD-10-CM

## 2024-03-01 PROCEDURE — 99203 OFFICE O/P NEW LOW 30 MIN: CPT | Performed by: OBSTETRICS & GYNECOLOGY

## 2024-03-01 PROCEDURE — 99213 OFFICE O/P EST LOW 20 MIN: CPT | Performed by: OBSTETRICS & GYNECOLOGY

## 2024-03-01 RX ORDER — ESTRADIOL 10 UG/1
10 INSERT VAGINAL WEEKLY
Qty: 12 TABLET | Refills: 3 | Status: SHIPPED | OUTPATIENT
Start: 2024-03-01 | End: 2024-05-15

## 2024-03-01 RX ORDER — SULFAMETHOXAZOLE/TRIMETHOPRIM 800-160 MG
1 TABLET ORAL 2 TIMES DAILY
Qty: 14 TABLET | Refills: 1 | Status: SHIPPED | OUTPATIENT
Start: 2024-03-01 | End: 2024-05-09

## 2024-03-01 NOTE — PATIENT INSTRUCTIONS
Thank you for trusting us with your care!     If you need to contact us for questions about:  Symptoms, Scheduling & Medical Questions; Non-urgent (2-3 day response) [place]ra message, Urgent (needing response today) [phone] (if after 3:30pm next day response)   Prescriptions: Please call your Pharmacy   Billing: Jorge [phone] or [facility]:[phone]

## 2024-03-01 NOTE — LETTER
"3/1/2024       RE: Barb Vazquez  [address]  [facility] [address]     Dear Colleague,    Thank you for referring your patient, Barb Vazquez, to the [facility] WOMEN'S CLINIC [place] at [facility]. Please see a copy of my visit note below.    62 yo P3 postmenopausal woman here for followup of two recent UTIs back to back. Also, stopped running during COVID due to UTIs and concerns about possible prolapse. Would like to compete in triathalons again.     Patient Active Problem List   Diagnosis    CARDIOVASCULAR SCREENING; LDL GOAL LESS THAN 160    Blepharospasm    Keloid of skin    Osteopenia    Vitamin D deficiency    Colon adenoma, tubular adenoma     No past medical history on file.  Past Surgical History:   Procedure Laterality Date    ABDOMEN SURGERY  ?    ovarian cyst    BREAST SURGERY      Lumpectomy    COLONOSCOPY  ?    Normal     OB History    Para Term  AB Living   3 3 0 0 0 3   SAB IAB Ectopic Multiple Live Births   0 0 0 0 3      # Outcome Date GA Lbr Stephane/2nd Weight Sex Delivery Anes PTL Lv   3 Para            2 Para            1 Para               Obstetric Comments   LMP      /66   Pulse 62   Ht 1.651 m (5' 5\")   BMI 25.79 kg/m    Appears well  EG atrohpic, no lesions  Vagina well supported, no lesions, small atrophic cervix  Urethra appears normal  Upon Valsalva no significant prolapse observed  With standing mild cystocele but all else is well supported.    A/P:  Genital atrophy with recent recurrent UTIs. Recommend topical E2 therapy. Rx for vagifem sent.   Mild cystocele- recommend resume running and training     RTC 2 months for repeat exam    Rx for Bactrim prn sent.     Yadira Lemus MD    Answers submitted by the patient for this visit:  ARACELY-7 (Submitted on 2024)  ARACELY 7 TOTAL SCORE: 0    "

## 2024-03-01 NOTE — PROGRESS NOTES
"62 yo P3 postmenopausal woman here for followup of two recent UTIs back to back. Also, stopped running during COVID due to UTIs and concerns about possible prolapse. Would like to compete in triathalons again.     Patient Active Problem List   Diagnosis    CARDIOVASCULAR SCREENING; LDL GOAL LESS THAN 160    Blepharospasm    Keloid of skin    Osteopenia    Vitamin D deficiency    Colon adenoma, tubular adenoma     No past medical history on file.  Past Surgical History:   Procedure Laterality Date    ABDOMEN SURGERY  ?    ovarian cyst    BREAST SURGERY      Lumpectomy    COLONOSCOPY  ?    Normal     OB History    Para Term  AB Living   3 3 0 0 0 3   SAB IAB Ectopic Multiple Live Births   0 0 0 0 3      # Outcome Date GA Lbr Stephane/2nd Weight Sex Delivery Anes PTL Lv   3 Para            2 Para            1 Para               Obstetric Comments   LMP      /66   Pulse 62   Ht 1.651 m (5' 5\")   BMI 25.79 kg/m    Appears well  EG atrohpic, no lesions  Vagina well supported, no lesions, small atrophic cervix  Urethra appears normal  Upon Valsalva no significant prolapse observed  With standing mild cystocele but all else is well supported.    A/P:  Genital atrophy with recent recurrent UTIs. Recommend topical E2 therapy. Rx for vagifem sent.   Mild cystocele- recommend resume running and training     RTC 2 months for repeat exam    Rx for Bactrim prn sent.     Yadira Lemus MD    Answers submitted by the patient for this visit:  ARACELY-7 (Submitted on 2024)  ARACELY 7 TOTAL SCORE: 0    "

## 2024-03-25 ENCOUNTER — TELEPHONE (OUTPATIENT)
Dept: OBGYN | Facility: CLINIC | Age: 64
End: 2024-03-25
Payer: COMMERCIAL

## 2024-03-25 NOTE — TELEPHONE ENCOUNTER
HANY for patient to call back to schedule a follow up appointment with Dr. Lemus. Dr. Lemus wanted to see this patient 2 months after her last appointment.    Marcella LAI

## 2024-05-06 SDOH — HEALTH STABILITY: PHYSICAL HEALTH: ON AVERAGE, HOW MANY DAYS PER WEEK DO YOU ENGAGE IN MODERATE TO STRENUOUS EXERCISE (LIKE A BRISK WALK)?: 7 DAYS

## 2024-05-06 SDOH — HEALTH STABILITY: PHYSICAL HEALTH: ON AVERAGE, HOW MANY MINUTES DO YOU ENGAGE IN EXERCISE AT THIS LEVEL?: 40 MIN

## 2024-05-06 ASSESSMENT — SOCIAL DETERMINANTS OF HEALTH (SDOH): HOW OFTEN DO YOU GET TOGETHER WITH FRIENDS OR RELATIVES?: THREE TIMES A WEEK

## 2024-05-09 ENCOUNTER — OFFICE VISIT (OUTPATIENT)
Dept: INTERNAL MEDICINE | Facility: CLINIC | Age: 64
End: 2024-05-09
Payer: COMMERCIAL

## 2024-05-09 VITALS
HEART RATE: 65 BPM | WEIGHT: 157.4 LBS | SYSTOLIC BLOOD PRESSURE: 122 MMHG | BODY MASS INDEX: 26.19 KG/M2 | DIASTOLIC BLOOD PRESSURE: 74 MMHG | OXYGEN SATURATION: 98 %

## 2024-05-09 DIAGNOSIS — N39.0 RECURRENT UTI: ICD-10-CM

## 2024-05-09 DIAGNOSIS — Z13.1 SCREENING FOR DIABETES MELLITUS: ICD-10-CM

## 2024-05-09 DIAGNOSIS — N95.9 POSTMENOPAUSAL SYMPTOMS: ICD-10-CM

## 2024-05-09 DIAGNOSIS — E78.00 ELEVATED LDL CHOLESTEROL LEVEL: ICD-10-CM

## 2024-05-09 DIAGNOSIS — Z13.0 SCREENING FOR DEFICIENCY ANEMIA: Primary | ICD-10-CM

## 2024-05-09 DIAGNOSIS — E89.41 SYMPTOMATIC POSTSURGICAL MENOPAUSE: ICD-10-CM

## 2024-05-09 PROCEDURE — 90677 PCV20 VACCINE IM: CPT | Performed by: NURSE PRACTITIONER

## 2024-05-09 PROCEDURE — 99397 PER PM REEVAL EST PAT 65+ YR: CPT | Mod: 25 | Performed by: NURSE PRACTITIONER

## 2024-05-09 PROCEDURE — 90471 IMMUNIZATION ADMIN: CPT | Performed by: NURSE PRACTITIONER

## 2024-05-09 RX ORDER — SULFAMETHOXAZOLE/TRIMETHOPRIM 800-160 MG
1 TABLET ORAL 2 TIMES DAILY
Qty: 14 TABLET | Refills: 1 | Status: SHIPPED | OUTPATIENT
Start: 2024-05-09

## 2024-05-09 NOTE — PROGRESS NOTES
Preventive Care Visit  [facility]  DARIO Godoy CNP, Internal Medicine  May 9, 2024    Assessment & Plan     Recurrent UTI  - sulfamethoxazole-trimethoprim (BACTRIM DS) 800-160 MG tablet; Take 1 tablet by mouth 2 times daily if needed.     Screening for deficiency anemia  - CBC with platelets; Future    Elevated LDL cholesterol level  - Lipid panel reflex to direct LDL Fasting; Future    Postmenopausal symptoms  - Vitamin D Deficiency; Future    Screening for diabetes mellitus  - Basic metabolic panel; Future    Recommended vaccines:  RSV  Covid 19  Influenza in the fall.    F/U one year for annual exam.    I spent a total of 35  minutes in the care of this pt during today's office visit. This time includes reviewing the patient's chart and prior history, obtaining a history, performing an examination and evaluation and counseling the patient. This time also includes ordering medications or tests necessary in addition to communication to other member's of the patient's health care team. Time spent in documentation and care coordination is included.     Daly BISHOP, CNP          Ady Day is a 63 year old, presenting for the following:  Physical    She is here for annual exam. She feels well. Still exercising several days a week.  Weight is stable.   She has had 2 UTIs in the past few months, most recently late April. She was given an RX for Bactrim from Dr. Lemus. She has a mild cystocele.She was also rx's vaginal estrogen.      [facility] schedule reviewed.    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 She lives alone but has family in town.           5/9/2024     4:38 PM   Additional Questions   Roomed by JEA EMT         Patient Active Problem List   Diagnosis     CARDIOVASCULAR SCREENING; LDL GOAL LESS THAN 160     Blepharospasm     Keloid of skin     Osteopenia     Vitamin D deficiency     Colon adenoma, tubular  adenoma         5/6/2024   General Health   How would you rate your overall physical health? Excellent   Feel stress (tense, anxious, or unable to sleep) Only a little   (!) STRESS CONCERN      5/6/2024   Nutrition   Diet: Regular (no restrictions)         5/6/2024   Exercise   Days per week of moderate/strenous exercise 7 days   Average minutes spent exercising at this level 40 min         5/6/2024   Social Factors   Frequency of gathering with friends or relatives Three times a week   Worry food won't last until get money to buy more No   Food not last or not have enough money for food? No   Do you have housing?  Yes   Are you worried about losing your housing? No   Lack of transportation? No   Unable to get utilities (heat,electricity)? No         5/6/2024   Fall Risk   Fallen 2 or more times in the past year? Yes   Trouble with walking or balance? No           5/6/2024   Activities of Daily Living- Home Safety   Needs help with the following daily activites None of the above   Safety concerns in the home Throw rugs in the hallway    No grab bars in the bathroom         5/6/2024   Dental   Dentist two times every year? Yes         5/6/2024   Hearing Screening   Hearing concerns? None of the above         5/6/2024   Driving Risk Screening   Patient/family members have concerns about driving No         5/6/2024   General Alertness/Fatigue Screening   Have you been more tired than usual lately? No         5/6/2024   Urinary Incontinence Screening   Bothered by leaking urine in past 6 months No         5/6/2024   TB Screening   Were you born outside of the [place]? No         Today's PHQ-2 Score:       5/9/2024     4:40 PM   PHQ-2 ( 1999 Pfizer)   Q1: Little interest or pleasure in doing things 0   Q2: Feeling down, depressed or hopeless 0   PHQ-2 Score 0           5/6/2024   Substance Use   Alcohol more than 3/day or more than 7/wk No   Do you have a current opioid prescription? No   How severe/bad is pain from 1 to 10?  0/10 (No Pain)   Do you use any other substances recreationally? No     Social History     Tobacco Use     Smoking status: Never     Smokeless tobacco: Never   Substance Use Topics     Alcohol use: Not Currently     Comment: very occassional     Drug use: No             5/6/2024   Breast Cancer Screening   Family history of breast, colon, or ovarian cancer? No / Unknown         10/9/2023   LAST FHS-7 RESULTS   1st degree relative breast or ovarian cancer No   Any relative bilateral breast cancer No   Any male have breast cancer No   Any ONE woman have BOTH breast AND ovarian cancer No   Any woman with breast cancer before 50yrs No   2 or more relatives with breast AND/OR ovarian cancer No   2 or more relatives with breast AND/OR bowel cancer No            History of abnormal Pap smear: no        Latest Ref Rng & Units 4/21/2022     5:08 PM 8/1/2017     9:36 AM 8/1/2017     9:30 AM   PAP / HPV   PAP  Negative for Intraepithelial Lesion or Malignancy (NILM)      PAP (Historical)   NIL     HPV 16 DNA Negative Negative   Negative    HPV 18 DNA Negative Negative   Negative    Other HR HPV Negative Negative   Negative      ASCVD Risk   The 10-year ASCVD risk score (Janie GANN, et al., 2019) is: 4.9%    Values used to calculate the score:      Age: 63 years      Sex: Female      Is Non- : No      Diabetic: No      Tobacco smoker: No      Systolic Blood Pressure: 122 mmHg      Is BP treated: No      HDL Cholesterol: 68 mg/dL      Total Cholesterol: 217 mg/dL          Reviewed and updated as needed this visit by Provider                    Past Surgical History:   Procedure Laterality Date     ABDOMEN SURGERY  2004?    ovarian cyst     BREAST SURGERY  1992    Lumpectomy     COLONOSCOPY  2021?    Normal     Current providers sharing in care for this patient include:  Patient Care Team:  Daly Euceda APRN CNP as PCP - General (Nurse Practitioner)  Daly Euceda APRN CNP as Family  Medicine Physician  Daly Euceda, APRN CNP as Assigned PCP  Dannie Beebe MD as MD (Allergy & Immunology)  Dannie Beebe MD as Assigned Surgical Provider  Yadira Lemus MD as MD (OB/Gyn)  Yadira Lemus MD as Assigned OBGYN Provider    The following health maintenance items are reviewed in Epic and correct as of today:  Health Maintenance   Topic Date Due     ANNUAL REVIEW OF HM ORDERS  Never done     ADVANCE CARE PLANNING  Never done     RSV VACCINE (Pregnancy & 60+) (1 - 1-dose 60+ series) Never done     COVID-19 Vaccine (5 - 2023-24 season) 09/01/2023     Pneumococcal Vaccine: 65+ Years (1 of 1 - PCV) 11/24/2023     MEDICARE ANNUAL WELLNESS VISIT  04/27/2024     MAMMO SCREENING  10/09/2024     FALL RISK ASSESSMENT  05/09/2025     COLORECTAL CANCER SCREENING  07/27/2025     GLUCOSE  05/17/2026     LIPID  05/17/2028     DTAP/TDAP/TD IMMUNIZATION (3 - Td or Tdap) 04/08/2031     DEXA  04/08/2036     HEPATITIS C SCREENING  Completed     HIV SCREENING  Completed     PHQ-2 (once per calendar year)  Completed     INFLUENZA VACCINE  Completed     ZOSTER IMMUNIZATION  Completed     IPV IMMUNIZATION  Aged Out     HPV IMMUNIZATION  Aged Out     MENINGITIS IMMUNIZATION  Aged Out     RSV MONOCLONAL ANTIBODY  Aged Out     PAP  Discontinued     Immunization History   Administered Date(s) Administered     COVID-19 Bivalent 12+ (Pfizer) 09/16/2022     COVID-19 MONOVALENT 12+ (Pfizer) 01/21/2021, 02/11/2021, 10/02/2021     Flu, Unspecified 10/01/2022     HEPA 04/03/2009, 08/16/2011     Hepatitis A (ADULT 19+) 04/03/2009, 08/16/2011     Influenza (IIV3) PF 10/10/2013     Influenza Vaccine >6 months,quad, PF 10/16/2014, 10/06/2018, 10/09/2021, 10/01/2022, 10/07/2023     Influenza Vaccine, 6+MO IM (QUADRIVALENT W/PRESERVATIVES) 09/29/2015, 10/05/2019, 10/03/2020     Pneumococcal 20 valent Conjugate (Prevnar 20) 05/09/2024     TD,PF 7+ (Tenivac) 08/03/1994     TDAP (Adacel,Boostrix) 04/08/2021     TDAP  "Vaccine (Boostrix) 08/16/2011     Td (Adult), Adsorbed 08/03/1994     Yellow Fever 04/03/2009     Zoster recombinant adjuvanted (SHINGRIX) 08/01/2020, 10/05/2020     Family History   Problem Relation Age of Onset     Neurologic Disorder Mother         pituitary tumor cancer/seizures     Memory loss Mother      Skin Cancer Father      Glaucoma Father      Skin Cancer Brother      Lipids Other        Review of Systems  Constitutional, HEENT, cardiovascular, pulmonary, gi and gu systems are negative, except as otherwise noted.     Objective    Exam  /74 (BP Location: Right arm, Patient Position: Sitting, Cuff Size: Adult Regular)   Pulse 65   Wt 71.4 kg (157 lb 6.4 oz)   SpO2 98%   BMI 26.19 kg/m     Estimated body mass index is 26.19 kg/m  as calculated from the following:    Height as of 3/1/24: 1.651 m (5' 5\").    Weight as of this encounter: 71.4 kg (157 lb 6.4 oz).    Physical Exam  GENERAL: alert and no distress  EYES: Eyes grossly normal to inspection,  and conjunctivae and sclerae normal  HENT: ear canals and TM's normal,  mouth without ulcers or lesions  NECK: no adenopathy, no asymmetry, masses, or scars  RESP: lungs clear to auscultation - no rales, rhonchi or wheezes  BREAST: normal without masses, tenderness or nipple discharge and no palpable axillary masses or adenopathy  CV: regular rate and rhythm, normal S1 S2, no S3 or S4, no murmur, click or rub, no peripheral edema  ABDOMEN: soft, nontender, no hepatosplenomegaly, no masses and bowel sounds normal  MS: no gross musculoskeletal defects noted, no edema  SKIN: no suspicious lesions or rashes  NEURO: Normal strength and tone, mentation intact and speech normal  PSYCH: mentation appears normal, affect normal/bright  LYMPH: no cervical, supraclavicular, axillary adenopathy        5/9/2024   Mini Cog   Clock Draw Score 2 Normal   3 Item Recall 3 objects recalled   Mini Cog Total Score 5             Signed Electronically by: Daly Euceda, " APRN CNP

## 2024-05-15 ENCOUNTER — OFFICE VISIT (OUTPATIENT)
Dept: OBGYN | Facility: CLINIC | Age: 64
End: 2024-05-15
Attending: OBSTETRICS & GYNECOLOGY
Payer: COMMERCIAL

## 2024-05-15 VITALS
BODY MASS INDEX: 26.16 KG/M2 | WEIGHT: 157 LBS | DIASTOLIC BLOOD PRESSURE: 71 MMHG | HEART RATE: 72 BPM | HEIGHT: 65 IN | SYSTOLIC BLOOD PRESSURE: 124 MMHG

## 2024-05-15 DIAGNOSIS — N90.5 VULVAR ATROPHY: ICD-10-CM

## 2024-05-15 PROCEDURE — 99213 OFFICE O/P EST LOW 20 MIN: CPT | Performed by: OBSTETRICS & GYNECOLOGY

## 2024-05-15 RX ORDER — ESTRADIOL 10 UG/1
10 INSERT VAGINAL WEEKLY
Qty: 12 TABLET | Refills: 3 | Status: SHIPPED | OUTPATIENT
Start: 2024-05-15

## 2024-05-15 NOTE — PROGRESS NOTES
"62 yo PM woman here for followup of topical E2 and mild cystocele. Has resumed running.     Patient Active Problem List   Diagnosis    CARDIOVASCULAR SCREENING; LDL GOAL LESS THAN 160    Blepharospasm    Keloid of skin    Osteopenia    Vitamin D deficiency    Colon adenoma, tubular adenoma     /71   Pulse 72   Ht 1.651 m (5' 5\")   Wt 71.2 kg (157 lb)   BMI 26.13 kg/m    Appears well  EG improved atrophy  Grade I cystocele unchanged    A/P:  Had one UTI since our last visit but that was less than one week on topical E2  Continue exercise regimen  Call if ongoing issues.     Yadira Lemus MD    "

## 2024-05-15 NOTE — LETTER
"5/15/2024       RE: Barb Vazquez  [address]  [place] [address]     Dear Colleague,    Thank you for referring your patient, Barb Vazquez, to the [facility] [place] at [facility]. Please see a copy of my visit note below.    62 yo PM woman here for followup of topical E2 and mild cystocele. Has resumed running.     Patient Active Problem List   Diagnosis    CARDIOVASCULAR SCREENING; LDL GOAL LESS THAN 160    Blepharospasm    Keloid of skin    Osteopenia    Vitamin D deficiency    Colon adenoma, tubular adenoma     /71   Pulse 72   Ht 1.651 m (5' 5\")   Wt 71.2 kg (157 lb)   BMI 26.13 kg/m    Appears well  EG improved atrophy  Grade I cystocele unchanged    A/P:  Had one UTI since our last visit but that was less than one week on topical E2  Continue exercise regimen  Call if ongoing issues.     Yadira Lemus MD    "

## 2024-05-15 NOTE — PATIENT INSTRUCTIONS
Thank you for trusting us with your care!     If you need to contact us for questions about:  Symptoms, Scheduling & Medical Questions; Non-urgent (2-3 day response) Negra message, Urgent (needing response today) [phone] (if after 3:30pm next day response)   Prescriptions: Please call your Pharmacy   Billing: Jorge [phone] or [facility]:[phone]

## 2024-05-16 ENCOUNTER — LAB (OUTPATIENT)
Dept: LAB | Facility: CLINIC | Age: 64
End: 2024-05-16
Payer: COMMERCIAL

## 2024-05-16 DIAGNOSIS — E78.00 ELEVATED LDL CHOLESTEROL LEVEL: ICD-10-CM

## 2024-05-16 DIAGNOSIS — Z13.0 SCREENING FOR DEFICIENCY ANEMIA: ICD-10-CM

## 2024-05-16 DIAGNOSIS — Z13.1 SCREENING FOR DIABETES MELLITUS: ICD-10-CM

## 2024-05-16 DIAGNOSIS — N95.9 POSTMENOPAUSAL SYMPTOMS: ICD-10-CM

## 2024-05-16 LAB
ANION GAP SERPL CALCULATED.3IONS-SCNC: 10 MMOL/L (ref 7–15)
BUN SERPL-MCNC: 15.3 MG/DL (ref 8–23)
CALCIUM SERPL-MCNC: 9.2 MG/DL (ref 8.8–10.2)
CHLORIDE SERPL-SCNC: 107 MMOL/L (ref 98–107)
CHOLEST SERPL-MCNC: 179 MG/DL
CREAT SERPL-MCNC: 0.96 MG/DL (ref 0.51–0.95)
DEPRECATED HCO3 PLAS-SCNC: 25 MMOL/L (ref 22–29)
EGFRCR SERPLBLD CKD-EPI 2021: 65 ML/MIN/1.73M2
ERYTHROCYTE [DISTWIDTH] IN BLOOD BY AUTOMATED COUNT: 12.7 % (ref 10–15)
FASTING STATUS PATIENT QL REPORTED: YES
FASTING STATUS PATIENT QL REPORTED: YES
GLUCOSE SERPL-MCNC: 93 MG/DL (ref 70–99)
HCT VFR BLD AUTO: 39.3 % (ref 35–47)
HDLC SERPL-MCNC: 57 MG/DL
HGB BLD-MCNC: 13 G/DL (ref 11.7–15.7)
LDLC SERPL CALC-MCNC: 108 MG/DL
MCH RBC QN AUTO: 30.9 PG (ref 26.5–33)
MCHC RBC AUTO-ENTMCNC: 33.1 G/DL (ref 31.5–36.5)
MCV RBC AUTO: 93 FL (ref 78–100)
NONHDLC SERPL-MCNC: 122 MG/DL
PLATELET # BLD AUTO: 237 10E3/UL (ref 150–450)
POTASSIUM SERPL-SCNC: 4 MMOL/L (ref 3.4–5.3)
RBC # BLD AUTO: 4.21 10E6/UL (ref 3.8–5.2)
SODIUM SERPL-SCNC: 142 MMOL/L (ref 135–145)
TRIGL SERPL-MCNC: 68 MG/DL
VIT D+METAB SERPL-MCNC: 24 NG/ML (ref 20–50)
WBC # BLD AUTO: 5.7 10E3/UL (ref 4–11)

## 2024-05-16 PROCEDURE — 80061 LIPID PANEL: CPT | Performed by: PATHOLOGY

## 2024-05-16 PROCEDURE — 36415 COLL VENOUS BLD VENIPUNCTURE: CPT | Performed by: PATHOLOGY

## 2024-05-16 PROCEDURE — 80048 BASIC METABOLIC PNL TOTAL CA: CPT | Performed by: PATHOLOGY

## 2024-05-16 PROCEDURE — 82306 VITAMIN D 25 HYDROXY: CPT | Performed by: NURSE PRACTITIONER

## 2024-05-16 PROCEDURE — 85027 COMPLETE CBC AUTOMATED: CPT | Performed by: PATHOLOGY

## 2024-05-16 PROCEDURE — 99000 SPECIMEN HANDLING OFFICE-LAB: CPT | Performed by: PATHOLOGY

## 2024-11-04 ENCOUNTER — ANCILLARY PROCEDURE (OUTPATIENT)
Dept: MAMMOGRAPHY | Facility: CLINIC | Age: 64
End: 2024-11-04
Attending: NURSE PRACTITIONER
Payer: COMMERCIAL

## 2024-11-04 DIAGNOSIS — Z12.31 VISIT FOR SCREENING MAMMOGRAM: ICD-10-CM

## 2024-11-04 PROCEDURE — 77067 SCR MAMMO BI INCL CAD: CPT | Performed by: RADIOLOGY

## 2024-11-04 PROCEDURE — 77063 BREAST TOMOSYNTHESIS BI: CPT | Performed by: RADIOLOGY

## 2025-04-29 ENCOUNTER — PATIENT OUTREACH (OUTPATIENT)
Dept: GASTROENTEROLOGY | Facility: CLINIC | Age: 65
End: 2025-04-29
Payer: COMMERCIAL

## 2025-04-29 DIAGNOSIS — Z12.11 SPECIAL SCREENING FOR MALIGNANT NEOPLASMS, COLON: Primary | ICD-10-CM

## 2025-04-29 NOTE — PROGRESS NOTES
"CRC Screening Colonoscopy Referral Review    Patient meets the inclusion criteria for screening colonoscopy standing order.    Ordering/Referring Provider:  Daly Euceda      BMI: Estimated body mass index is 26.13 kg/m  as calculated from the following:    Height as of 5/15/24: 1.651 m (5' 5\").    Weight as of 5/15/24: 71.2 kg (157 lb).     Sedation:  Does patient have any of the following conditions affecting sedation?  No medical conditions affecting sedation.    Previous Scopes:  Any previous recommendations or follow up needs based on previous scope?  na / No recommendations.    Medical Concerns to Postpone Order:  Does patient have any of the following medical concerns that should postpone/delay colonoscopy referral?  No medical conditions affecting colonoscopy referral.    Final Referral Details:  Based on patient's medical history patient is appropriate for referral order with moderate sedation. If patient's BMI > 50 do not schedule in ASC.  "

## 2025-05-09 SDOH — HEALTH STABILITY: PHYSICAL HEALTH: ON AVERAGE, HOW MANY MINUTES DO YOU ENGAGE IN EXERCISE AT THIS LEVEL?: 50 MIN

## 2025-05-09 SDOH — HEALTH STABILITY: PHYSICAL HEALTH: ON AVERAGE, HOW MANY DAYS PER WEEK DO YOU ENGAGE IN MODERATE TO STRENUOUS EXERCISE (LIKE A BRISK WALK)?: 6 DAYS

## 2025-05-09 ASSESSMENT — SOCIAL DETERMINANTS OF HEALTH (SDOH): HOW OFTEN DO YOU GET TOGETHER WITH FRIENDS OR RELATIVES?: MORE THAN THREE TIMES A WEEK

## 2025-05-12 NOTE — PROGRESS NOTES
Review of Systems  Constitutional, HEENT, cardiovascular, pulmonary, gi and gu systems are negative, except as otherwise noted.     Preventive Care Visit  [facility]  DARIO Godoy CNP, Internal Medicine  May 13, 2025  {Provider  Link to [facility] :334032}    {PROVIDER CHARTING PREFERENCE:114111}    Ady Day is a 64 year old, presenting for the following:  Physical (Referral for physical therapy )        5/13/2025     7:52 AM   Additional Questions   Roomed by           HPI  ***   {MA/LPN/RN Pre-Provider Visit Orders- hCG/UA/Strep (Optional):879219}  {SUPERLIST (Optional):310005}  {additonal problems for provider to add (Optional):942672}  Advance Care Planning  {The storyboard will display whether the patient has ACP docs on file. Hover over the Code section in the storyboard to access the ACP documents. :002359}  {(AWV REQUIRED) Advance Care Planning Reviewed:522221}        5/9/2025   General Health   How would you rate your overall physical health? Excellent   Feel stress (tense, anxious, or unable to sleep) Only a little   (!) STRESS CONCERN      5/9/2025   Nutrition   Diet: Regular (no restrictions)         5/9/2025   Exercise   Days per week of moderate/strenous exercise 6 days   Average minutes spent exercising at this level 50 min         5/9/2025   Social Factors   Frequency of gathering with friends or relatives More than three times a week   Worry food won't last until get money to buy more No   Food not last or not have enough money for food? No   Do you have housing? (Housing is defined as stable permanent housing and does not include staying outside in a car, in a tent, in an abandoned building, in an overnight shelter, or couch-surfing.) Yes   Are you worried about losing your housing? No   Lack of transportation? No   Unable to get utilities (heat,electricity)? No         5/9/2025   Fall Risk   Fallen 2 or more times in the past  year? No   Trouble with walking or balance? No          5/9/2025   Activities of Daily Living- Home Safety   Needs help with the following daily activites None of the above   Safety concerns in the home No grab bars in the bathroom         5/9/2025   Dental   Dentist two times every year? Yes         5/9/2025   Hearing Screening   Hearing concerns? None of the above         5/9/2025   Driving Risk Screening   Patient/family members have concerns about driving No         5/9/2025   General Alertness/Fatigue Screening   Have you been more tired than usual lately? No         5/9/2025   Urinary Incontinence Screening   Bothered by leaking urine in past 6 months No       {Rooming Staff Patient needs a PHQ as part of the AWV.  Use this link to complete and then refresh the note to pull results Link to PHQ2 Assessment :953600}  {USE TO PULL IN PHQ RESULTS FOR TODAY:251348}      5/9/2025   Substance Use   Alcohol more than 3/day or more than 7/wk No   Do you have a current opioid prescription? No   How severe/bad is pain from 1 to 10? 0/10 (No Pain)   Do you use any other substances recreationally? No     Social History     Tobacco Use    Smoking status: Never    Smokeless tobacco: Never   Substance Use Topics    Alcohol use: Not Currently     Comment: very occassional    Drug use: No     {Provider  If there are gaps in the social history shown above, please follow the link to update and then refresh the note Link to Social and Substance History :032667}      11/4/2024   LAST FHS-7 RESULTS   1st degree relative breast or ovarian cancer No   Any relative bilateral breast cancer No   Any male have breast cancer No   Any ONE woman have BOTH breast AND ovarian cancer No   Any woman with breast cancer before 50yrs No   2 or more relatives with breast AND/OR ovarian cancer No   2 or more relatives with breast AND/OR bowel cancer No     {If any of the questions to the FHS7 are answered yes, consider referral for genetic  counseling.    Additional indications for genetic referral include personal history of breast or ovarian cancer, genetic mutation in 1st degree relative which increases risk of breast cancer including BRCA1, BRCA2, LEE, PALB 2, TP53, CHEK2, PTEN, CDH1, STK11 (per ACS) and/or 1st degree relative with history of pancreatic or high-risk prostate cancer (per NCCN):370195}   {Mammogram Decision Support (Optional):933054}      History of abnormal Pap smear: { :535996}        Latest Ref Rng & Units 4/21/2022     5:08 PM 8/1/2017     9:36 AM 8/1/2017     9:30 AM   PAP / HPV   PAP  Negative for Intraepithelial Lesion or Malignancy (NILM)      PAP (Historical)   NIL     HPV 16 DNA Negative Negative   Negative    HPV 18 DNA Negative Negative   Negative    Other HR HPV Negative Negative   Negative      ASCVD Risk   The 10-year ASCVD risk score (Janie GANN, et al., 2019) is: 4.2%    Values used to calculate the score:      Age: 64 years      Sex: Female      Is Non- : No      Diabetic: No      Tobacco smoker: No      Systolic Blood Pressure: 106 mmHg      Is BP treated: No      HDL Cholesterol: 68 mg/dL      Total Cholesterol: 221 mg/dL    {Link to Fracture Risk Assessment Tool (Optional):064266}    {Provider  REQUIRED FOR AWV Use the storyboard to review patient history, after sections have been marked as reviewed, refresh note to capture documentation:710900}  {Provider   REQUIRED AWV use this link to review and update sexual activity history  after section has been marked as reviewed, refresh note to capture documentation:961479}  Reviewed and updated as needed this visit by Provider                    {HISTORY OPTIONS (Optional):440136}  Current providers sharing in care for this patient include:  Patient Care Team:  Daly Euceda APRN CNP as PCP - General (Nurse Practitioner)  Daly Euceda APRN CNP as Family Medicine Physician  Daly Euceda APRN CNP as Assigned  "PCP  Dannie Beebe MD as MD (Allergy & Immunology)  Yadira Lemus MD as MD (OB/Gyn)  Yadira Lemus MD as Assigned OBGYN Provider    The following health maintenance items are reviewed in Epic and correct as of today:  Health Maintenance   Topic Date Due    ADVANCE CARE PLANNING  Never done    PHQ-2 (once per calendar year)  01/01/2025    COVID-19 Vaccine (6 - 2024-25 season) 05/04/2025    ANNUAL REVIEW OF HM ORDERS  05/09/2025    MEDICARE ANNUAL WELLNESS VISIT  05/09/2025    COLORECTAL CANCER SCREENING  07/27/2025    MAMMO SCREENING  11/04/2025    FALL RISK ASSESSMENT  05/13/2026    DIABETES SCREENING  05/13/2028    LIPID  05/13/2030    DTAP/TDAP/TD IMMUNIZATION (3 - Td or Tdap) 04/08/2031    RSV VACCINE (1 - 1-dose 75+ series) 11/24/2033    DEXA  04/08/2036    HEPATITIS C SCREENING  Completed    INFLUENZA VACCINE  Completed    Pneumococcal Vaccine: 50+ Years  Completed    ZOSTER IMMUNIZATION  Completed    HPV IMMUNIZATION  Aged Out    MENINGITIS IMMUNIZATION  Aged Out    PAP  Discontinued       {ROS Picklists (Optional):057982}     Objective    Exam  /67 (BP Location: Right arm, Patient Position: Sitting, Cuff Size: Adult Regular)   Pulse 61   Temp 97.8  F (36.6  C) (Oral)   Resp 15   Ht 1.651 m (5' 5\")   Wt 71.3 kg (157 lb 3.2 oz)   SpO2 100%   BMI 26.16 kg/m     Estimated body mass index is 26.16 kg/m  as calculated from the following:    Height as of this encounter: 1.651 m (5' 5\").    Weight as of this encounter: 71.3 kg (157 lb 3.2 oz).    Physical Exam   Physical Exam  GENERAL: alert and no distress  EYES: Eyes grossly normal to inspection,  and conjunctivae and sclerae normal  HENT: ear canals and TM's normal,  mouth without ulcers or lesions  NECK: no adenopathy, no asymmetry, masses, or scars  RESP: lungs clear to auscultation - no rales, rhonchi or wheezes  BREAST: normal without masses, tenderness or nipple discharge and no palpable axillary masses or adenopathy  CV: " regular rate and rhythm, normal S1 S2, no S3 or S4, no murmur, click or rub, no peripheral edema  ABDOMEN: soft, nontender, no hepatosplenomegaly, no masses and bowel sounds normal  MS: no gross musculoskeletal defects noted, no edema  SKIN: no suspicious lesions or rashes  NEURO: Normal strength and tone, mentation intact and speech normal  PSYCH: mentation appears normal, affect normal/bright  LYMPH: no cervical, supraclavicular, axillary adenopathy           5/13/2025   Mini Cog   Clock Draw Score 2 Normal   3 Item Recall 3 objects recalled   Mini Cog Total Score 5     {A Mini-Cog total score of 0-2 suggests the possibility of dementia, score of 3-5 suggests no dementia:242402}    Vision Screen     {Provider  Link to Vision and Hearing Results :829320}    Signed Electronically by: DARIO Godoy CNP  {Email feedback regarding this note to [email or web address]   :156121}

## 2025-05-13 ENCOUNTER — RESULTS FOLLOW-UP (OUTPATIENT)
Dept: INTERNAL MEDICINE | Facility: CLINIC | Age: 65
End: 2025-05-13

## 2025-05-13 ENCOUNTER — OFFICE VISIT (OUTPATIENT)
Dept: INTERNAL MEDICINE | Facility: CLINIC | Age: 65
End: 2025-05-13
Payer: COMMERCIAL

## 2025-05-13 ENCOUNTER — LAB (OUTPATIENT)
Dept: LAB | Facility: CLINIC | Age: 65
End: 2025-05-13
Payer: COMMERCIAL

## 2025-05-13 VITALS
BODY MASS INDEX: 26.19 KG/M2 | WEIGHT: 157.2 LBS | HEART RATE: 61 BPM | SYSTOLIC BLOOD PRESSURE: 106 MMHG | RESPIRATION RATE: 15 BRPM | OXYGEN SATURATION: 100 % | DIASTOLIC BLOOD PRESSURE: 67 MMHG | TEMPERATURE: 97.8 F | HEIGHT: 65 IN

## 2025-05-13 DIAGNOSIS — Z12.11 SCREENING FOR COLON CANCER: ICD-10-CM

## 2025-05-13 DIAGNOSIS — N95.9 POSTMENOPAUSAL SYMPTOMS: ICD-10-CM

## 2025-05-13 DIAGNOSIS — M85.88 OTHER SPECIFIED DISORDERS OF BONE DENSITY AND STRUCTURE, OTHER SITE: ICD-10-CM

## 2025-05-13 DIAGNOSIS — M25.551 HIP PAIN, RIGHT: ICD-10-CM

## 2025-05-13 DIAGNOSIS — Z13.0 SCREENING FOR DEFICIENCY ANEMIA: ICD-10-CM

## 2025-05-13 DIAGNOSIS — Z13.1 SCREENING FOR DIABETES MELLITUS: ICD-10-CM

## 2025-05-13 DIAGNOSIS — M89.9 DISORDER OF BONE AND CARTILAGE: ICD-10-CM

## 2025-05-13 DIAGNOSIS — M85.80 OSTEOPENIA, UNSPECIFIED LOCATION: ICD-10-CM

## 2025-05-13 DIAGNOSIS — Z12.11 SCREENING FOR COLON CANCER: Primary | ICD-10-CM

## 2025-05-13 DIAGNOSIS — M94.9 DISORDER OF BONE AND CARTILAGE: ICD-10-CM

## 2025-05-13 LAB
ALBUMIN SERPL BCG-MCNC: 4.3 G/DL (ref 3.5–5.2)
ALP SERPL-CCNC: 83 U/L (ref 40–150)
ALT SERPL W P-5'-P-CCNC: 12 U/L (ref 0–50)
ANION GAP SERPL CALCULATED.3IONS-SCNC: 11 MMOL/L (ref 7–15)
AST SERPL W P-5'-P-CCNC: 20 U/L (ref 0–45)
BILIRUB SERPL-MCNC: 0.4 MG/DL
BUN SERPL-MCNC: 18.8 MG/DL (ref 8–23)
CALCIUM SERPL-MCNC: 9.4 MG/DL (ref 8.8–10.4)
CHLORIDE SERPL-SCNC: 106 MMOL/L (ref 98–107)
CHOLEST SERPL-MCNC: 221 MG/DL
CREAT SERPL-MCNC: 1.02 MG/DL (ref 0.51–0.95)
EGFRCR SERPLBLD CKD-EPI 2021: 60 ML/MIN/1.73M2
ERYTHROCYTE [DISTWIDTH] IN BLOOD BY AUTOMATED COUNT: 13 % (ref 10–15)
EST. AVERAGE GLUCOSE BLD GHB EST-MCNC: 120 MG/DL
FASTING STATUS PATIENT QL REPORTED: YES
FASTING STATUS PATIENT QL REPORTED: YES
GLUCOSE SERPL-MCNC: 98 MG/DL (ref 70–99)
HBA1C MFR BLD: 5.8 %
HCO3 SERPL-SCNC: 25 MMOL/L (ref 22–29)
HCT VFR BLD AUTO: 40.6 % (ref 35–47)
HDLC SERPL-MCNC: 68 MG/DL
HGB BLD-MCNC: 13 G/DL (ref 11.7–15.7)
LDLC SERPL CALC-MCNC: 136 MG/DL
MCH RBC QN AUTO: 30.3 PG (ref 26.5–33)
MCHC RBC AUTO-ENTMCNC: 32 G/DL (ref 31.5–36.5)
MCV RBC AUTO: 95 FL (ref 78–100)
NONHDLC SERPL-MCNC: 153 MG/DL
PLATELET # BLD AUTO: 229 10E3/UL (ref 150–450)
POTASSIUM SERPL-SCNC: 4 MMOL/L (ref 3.4–5.3)
PROT SERPL-MCNC: 7.1 G/DL (ref 6.4–8.3)
RBC # BLD AUTO: 4.29 10E6/UL (ref 3.8–5.2)
SODIUM SERPL-SCNC: 142 MMOL/L (ref 135–145)
TRIGL SERPL-MCNC: 83 MG/DL
WBC # BLD AUTO: 5.9 10E3/UL (ref 4–11)

## 2025-05-13 PROCEDURE — 3074F SYST BP LT 130 MM HG: CPT | Performed by: NURSE PRACTITIONER

## 2025-05-13 PROCEDURE — 3078F DIAST BP <80 MM HG: CPT | Performed by: NURSE PRACTITIONER

## 2025-05-13 PROCEDURE — 83036 HEMOGLOBIN GLYCOSYLATED A1C: CPT | Performed by: NURSE PRACTITIONER

## 2025-05-13 PROCEDURE — 99397 PER PM REEVAL EST PAT 65+ YR: CPT | Performed by: NURSE PRACTITIONER

## 2025-05-13 PROCEDURE — 99000 SPECIMEN HANDLING OFFICE-LAB: CPT | Performed by: PATHOLOGY

## 2025-05-13 NOTE — PROGRESS NOTES
Preventive Care Visit  [facility]  DARIO Godoy CNP, Internal Medicine  May 13, 2025  {Provider  Link to [facility] :852662}    {PROVIDER CHARTING PREFERENCE:623345}    Ady Day is a 64 year old, presenting for the following:  Physical (Referral for physical therapy )        5/13/2025     7:52 AM   Additional Questions   Roomed by           HPI  ***   {MA/LPN/RN Pre-Provider Visit Orders- hCG/UA/Strep (Optional):334806}  {SUPERLIST (Optional):616929}  {additonal problems for provider to add (Optional):213645}  Advance Care Planning  {The storyboard will display whether the patient has ACP docs on file. Hover over the Code section in the storyboard to access the ACP documents. :242930}  {(AWV REQUIRED) Advance Care Planning Reviewed:853455}        5/9/2025   General Health   How would you rate your overall physical health? Excellent   Feel stress (tense, anxious, or unable to sleep) Only a little   (!) STRESS CONCERN      5/9/2025   Nutrition   Diet: Regular (no restrictions)         5/9/2025   Exercise   Days per week of moderate/strenous exercise 6 days   Average minutes spent exercising at this level 50 min         5/9/2025   Social Factors   Frequency of gathering with friends or relatives More than three times a week   Worry food won't last until get money to buy more No   Food not last or not have enough money for food? No   Do you have housing? (Housing is defined as stable permanent housing and does not include staying outside in a car, in a tent, in an abandoned building, in an overnight shelter, or couch-surfing.) Yes   Are you worried about losing your housing? No   Lack of transportation? No   Unable to get utilities (heat,electricity)? No         5/9/2025   Fall Risk   Fallen 2 or more times in the past year? No   Trouble with walking or balance? No          5/9/2025   Activities of Daily Living- Home Safety   Needs help with the  following daily activites None of the above   Safety concerns in the home No grab bars in the bathroom         5/9/2025   Dental   Dentist two times every year? Yes         5/9/2025   Hearing Screening   Hearing concerns? None of the above         5/9/2025   Driving Risk Screening   Patient/family members have concerns about driving No         5/9/2025   General Alertness/Fatigue Screening   Have you been more tired than usual lately? No         5/9/2025   Urinary Incontinence Screening   Bothered by leaking urine in past 6 months No       {Rooming Staff Patient needs a PHQ as part of the AWV.  Use this link to complete and then refresh the note to pull results Link to PHQ2 Assessment :967350}  {USE TO PULL IN PHQ RESULTS FOR TODAY:447017}      5/9/2025   Substance Use   Alcohol more than 3/day or more than 7/wk No   Do you have a current opioid prescription? No   How severe/bad is pain from 1 to 10? 0/10 (No Pain)   Do you use any other substances recreationally? No     Social History     Tobacco Use    Smoking status: Never    Smokeless tobacco: Never   Substance Use Topics    Alcohol use: Not Currently     Comment: very occassional    Drug use: No     {Provider  If there are gaps in the social history shown above, please follow the link to update and then refresh the note Link to Social and Substance History :909821}      11/4/2024   LAST FHS-7 RESULTS   1st degree relative breast or ovarian cancer No   Any relative bilateral breast cancer No   Any male have breast cancer No   Any ONE woman have BOTH breast AND ovarian cancer No   Any woman with breast cancer before 50yrs No   2 or more relatives with breast AND/OR ovarian cancer No   2 or more relatives with breast AND/OR bowel cancer No     {If any of the questions to the FHS7 are answered yes, consider referral for genetic counseling.    Additional indications for genetic referral include personal history of breast or ovarian cancer, genetic mutation in 1st  degree relative which increases risk of breast cancer including BRCA1, BRCA2, LEE, PALB 2, TP53, CHEK2, PTEN, CDH1, STK11 (per ACS) and/or 1st degree relative with history of pancreatic or high-risk prostate cancer (per NCCN):342454}   {Mammogram Decision Support (Optional):600813}      History of abnormal Pap smear: { :196836}        Latest Ref Rng & Units 4/21/2022     5:08 PM 8/1/2017     9:36 AM 8/1/2017     9:30 AM   PAP / HPV   PAP  Negative for Intraepithelial Lesion or Malignancy (NILM)      PAP (Historical)   NIL     HPV 16 DNA Negative Negative   Negative    HPV 18 DNA Negative Negative   Negative    Other HR HPV Negative Negative   Negative      ASCVD Risk   The 10-year ASCVD risk score (Janie GANN, et al., 2019) is: 4%    Values used to calculate the score:      Age: 64 years      Sex: Female      Is Non- : No      Diabetic: No      Tobacco smoker: No      Systolic Blood Pressure: 106 mmHg      Is BP treated: No      HDL Cholesterol: 57 mg/dL      Total Cholesterol: 179 mg/dL    {Link to Fracture Risk Assessment Tool (Optional):110485}    {Provider  REQUIRED FOR AWV Use the storyboard to review patient history, after sections have been marked as reviewed, refresh note to capture documentation:870535}  {Provider   REQUIRED AWV use this link to review and update sexual activity history  after section has been marked as reviewed, refresh note to capture documentation:691604}  Reviewed and updated as needed this visit by Provider                    {HISTORY OPTIONS (Optional):475932}  Current providers sharing in care for this patient include:  Patient Care Team:  Daly Euceda APRN CNP as PCP - General (Nurse Practitioner)  Daly Euceda APRN CNP as Family Medicine Physician  Daly Euceda APRN CNP as Assigned PCP  Dannie Beebe MD as MD (Allergy & Immunology)  Yadira Lemus MD as MD (OB/Gyn)  Yadira Lemus MD as Assigned OBGYN  "Provider    The following health maintenance items are reviewed in Epic and correct as of today:  Health Maintenance   Topic Date Due    ADVANCE CARE PLANNING  Never done    PHQ-2 (once per calendar year)  01/01/2025    COVID-19 Vaccine (6 - 2024-25 season) 05/04/2025    ANNUAL REVIEW OF HM ORDERS  05/09/2025    MEDICARE ANNUAL WELLNESS VISIT  05/09/2025    COLORECTAL CANCER SCREENING  07/27/2025    MAMMO SCREENING  11/04/2025    FALL RISK ASSESSMENT  05/13/2026    DIABETES SCREENING  05/16/2027    LIPID  05/16/2029    DTAP/TDAP/TD IMMUNIZATION (3 - Td or Tdap) 04/08/2031    RSV VACCINE (1 - 1-dose 75+ series) 11/24/2033    DEXA  04/08/2036    HEPATITIS C SCREENING  Completed    INFLUENZA VACCINE  Completed    Pneumococcal Vaccine: 50+ Years  Completed    ZOSTER IMMUNIZATION  Completed    HPV IMMUNIZATION  Aged Out    MENINGITIS IMMUNIZATION  Aged Out    PAP  Discontinued       {ROS Picklists (Optional):513449}     Objective    Exam  /67 (BP Location: Right arm, Patient Position: Sitting, Cuff Size: Adult Regular)   Pulse 61   Temp 97.8  F (36.6  C) (Oral)   Resp 15   Ht 1.651 m (5' 5\")   Wt 71.3 kg (157 lb 3.2 oz)   SpO2 100%   BMI 26.16 kg/m     Estimated body mass index is 26.16 kg/m  as calculated from the following:    Height as of this encounter: 1.651 m (5' 5\").    Weight as of this encounter: 71.3 kg (157 lb 3.2 oz).    Physical Exam  {Exam Choices (Optional):333352}        5/13/2025   Mini Cog   Clock Draw Score 2 Normal   3 Item Recall 3 objects recalled   Mini Cog Total Score 5     {A Mini-Cog total score of 0-2 suggests the possibility of dementia, score of 3-5 suggests no dementia:757824}    Vision Screen     {Provider  Link to Vision and Hearing Results :818409}    Signed Electronically by: DARIO Godoy CNP  {Email feedback regarding this note to [email or web address]   :256720}  "

## 2025-05-13 NOTE — PROGRESS NOTES
"    Preventive Care Visit  [facility]  DARIO Godoy CNP, Internal Medicine  May 13, 2025      Assessment & Plan     Screening for colon cancer  - Colonoscopy Screening  Referral; Future    Hip pain, right  - Physical Therapy  Referral; Future    Screening for diabetes mellitus  - Comprehensive metabolic panel (BMP + Alb, Alk Phos, ALT, AST, Total. Bili, TP); Future  - Hemoglobin A1c; Future    Screening for deficiency anemia  - CBC with platelets; Future      Other specified disorders of bone density and structure, other site  - DX Bone Density; Future      Elevated LDL  - Lipid panel reflex to direct LDL Fasting; Future    BMI  Estimated body mass index is 26.16 kg/m  as calculated from the following:    Height as of this encounter: 1.651 m (5' 5\").    Weight as of this encounter: 71.3 kg (157 lb 3.2 oz).     I spent a total of  45 minutes in the care of this pt during today's office visit. This time includes reviewing the patient's chart and prior history, obtaining a history, performing an examination and evaluation and counseling the patient. This time also includes ordering medications or tests necessary in addition to communication to other member's of the patient's health care team. Time spent in documentation and care coordination is included.     Daly BISHOP, SAM      Ady Day is a 64 year old, presenting for the following:  Physical (Referral for physical therapy )        5/13/2025     7:52 AM   Additional Questions   Roomed by TONI Hastingslennox is a 64 year old female who presents for annual preventive visit as well as to discuss a possible PT referral for what she thinks might be  a piriformis strain in her right side. She cannot run. She fell this past winter while walking and thinks she might have injured it then. She has signed up for a triathlon in August and would like to be able to run.  Pain " is aggravated by running or sitting for prolonged periods.    She discontinued her vaginal estrogen due to cost.    She is retired.    She has grand kids in town and in [place] and [place] and visits them often.     Dental and EYE appts UTD.        Patient Active Problem List   Diagnosis    CARDIOVASCULAR SCREENING; LDL GOAL LESS THAN 160    Blepharospasm    Keloid of skin    Osteopenia    Vitamin D deficiency    Colon adenoma, tubular adenoma           5/9/2025   General Health   How would you rate your overall physical health? Excellent   Feel stress (tense, anxious, or unable to sleep) Only a little   (!) STRESS CONCERN      5/9/2025   Nutrition   Diet: Regular (no restrictions)         5/9/2025   Exercise   Days per week of moderate/strenous exercise 6 days   Average minutes spent exercising at this level 50 min         5/9/2025   Social Factors   Frequency of gathering with friends or relatives More than three times a week   Worry food won't last until get money to buy more No   Food not last or not have enough money for food? No   Do you have housing? (Housing is defined as stable permanent housing and does not include staying outside in a car, in a tent, in an abandoned building, in an overnight shelter, or couch-surfing.) Yes   Are you worried about losing your housing? No   Lack of transportation? No   Unable to get utilities (heat,electricity)? No         5/9/2025   Fall Risk   Fallen 2 or more times in the past year? No   Trouble with walking or balance? No          5/9/2025   Activities of Daily Living- Home Safety   Needs help with the following daily activites None of the above   Safety concerns in the home No grab bars in the bathroom         5/9/2025   Dental   Dentist two times every year? Yes         5/9/2025   Hearing Screening   Hearing concerns? None of the above         5/9/2025   Driving Risk Screening   Patient/family members have concerns about driving No         5/9/2025   General  Alertness/Fatigue Screening   Have you been more tired than usual lately? No         5/9/2025   Urinary Incontinence Screening   Bothered by leaking urine in past 6 months No         5/9/2025   Substance Use   Alcohol more than 3/day or more than 7/wk No   Do you have a current opioid prescription? No   How severe/bad is pain from 1 to 10? 0/10 (No Pain)   Do you use any other substances recreationally? No     Social History     Tobacco Use    Smoking status: Never    Smokeless tobacco: Never   Substance Use Topics    Alcohol use: Not Currently     Comment: very occassional    Drug use: No           11/4/2024   LAST FHS-7 RESULTS   1st degree relative breast or ovarian cancer No   Any relative bilateral breast cancer No   Any male have breast cancer No   Any ONE woman have BOTH breast AND ovarian cancer No   Any woman with breast cancer before 50yrs No   2 or more relatives with breast AND/OR ovarian cancer No   2 or more relatives with breast AND/OR bowel cancer No                    Latest Ref Rng & Units 4/21/2022     5:08 PM 8/1/2017     9:36 AM 8/1/2017     9:30 AM   PAP / HPV   PAP  Negative for Intraepithelial Lesion or Malignancy (NILM)      PAP (Historical)   NIL     HPV 16 DNA Negative Negative   Negative    HPV 18 DNA Negative Negative   Negative    Other HR HPV Negative Negative   Negative      ASCVD Risk   The 10-year ASCVD risk score (Janie GANN, et al., 2019) is: 4.2%    Values used to calculate the score:      Age: 64 years      Sex: Female      Is Non- : No      Diabetic: No      Tobacco smoker: No      Systolic Blood Pressure: 106 mmHg      Is BP treated: No      HDL Cholesterol: 68 mg/dL      Total Cholesterol: 221 mg/dL            Reviewed and updated as needed this visit by Provider                  No past medical history on file.  Past Surgical History:   Procedure Laterality Date    ABDOMEN SURGERY  2004?    ovarian cyst    BREAST SURGERY  1992    Lumpectomy     "COLONOSCOPY  2021?    Normal     Review of Systems  Constitutional, HEENT, cardiovascular, pulmonary, gi and gu systems are negative, except as otherwise noted.        Objective    Exam  /67 (BP Location: Right arm, Patient Position: Sitting, Cuff Size: Adult Regular)   Pulse 61   Temp 97.8  F (36.6  C) (Oral)   Resp 15   Ht 1.651 m (5' 5\")   Wt 71.3 kg (157 lb 3.2 oz)   SpO2 100%   BMI 26.16 kg/m     Estimated body mass index is 26.16 kg/m  as calculated from the following:    Height as of this encounter: 1.651 m (5' 5\").    Weight as of this encounter: 71.3 kg (157 lb 3.2 oz).    Physical Exam   Physical Exam  GENERAL: alert and no distress  EYES: Eyes grossly normal to inspection,  and conjunctivae and sclerae normal  HENT: ear canals and TM's normal,  mouth without ulcers or lesions  NECK: no adenopathy, no asymmetry, masses, or scars  RESP: lungs clear to auscultation - no rales, rhonchi or wheezes  BREAST: normal without masses, tenderness or nipple discharge and no palpable axillary masses or adenopathy  CV: regular rate and rhythm, normal S1 S2, no S3 or S4, no murmur, click or rub, no peripheral edema  ABDOMEN: soft, nontender, no hepatosplenomegaly, no masses and bowel sounds normal  MS: no gross musculoskeletal defects noted, no edema  SKIN: no suspicious lesions or rashes  NEURO: Normal strength and tone, mentation intact and speech normal  PSYCH: mentation appears normal, affect normal/bright  LYMPH: no cervical, supraclavicular, axillary adenopathy     The 10-year ASCVD risk score (Janie GANN, et al., 2019) is: 4.2%    Values used to calculate the score:      Age: 64 years      Sex: Female      Is Non- : No      Diabetic: No      Tobacco smoker: No      Systolic Blood Pressure: 106 mmHg      Is BP treated: No      HDL Cholesterol: 68 mg/dL      Total Cholesterol: 221 mg/dL          5/13/2025   Mini Cog   Clock Draw Score 2 Normal   3 Item Recall 3 objects recalled "   Mini Cog Total Score 5         Vision Screen         Signed Electronically by: DARIO Godoy CNP

## 2025-05-19 ENCOUNTER — ANCILLARY PROCEDURE (OUTPATIENT)
Dept: BONE DENSITY | Facility: CLINIC | Age: 65
End: 2025-05-19
Attending: NURSE PRACTITIONER
Payer: COMMERCIAL

## 2025-05-19 DIAGNOSIS — M85.80 OSTEOPENIA, UNSPECIFIED LOCATION: ICD-10-CM

## 2025-05-19 DIAGNOSIS — M85.88 OTHER SPECIFIED DISORDERS OF BONE DENSITY AND STRUCTURE, OTHER SITE: ICD-10-CM

## 2025-05-19 PROCEDURE — 77080 DXA BONE DENSITY AXIAL: CPT | Performed by: INTERNAL MEDICINE

## 2025-05-22 ASSESSMENT — ACTIVITIES OF DAILY LIVING (ADL)
ABILITY_TO_PERFORM_ACTIVITY_WITH_YOUR_NORMAL_TECHNIQUE: NO DIFFICULTY AT ALL
PUTTING_ON_SOCKS_AND_SHOES: NO DIFFICULTY AT ALL
GETTING_INTO_AND_OUT_OF_A_BATHTUB: NO DIFFICULTY AT ALL
WALKING_INITIALLY: NO DIFFICULTY AT ALL
GOING_UP_1_FLIGHT_OF_STAIRS: NO DIFFICULTY AT ALL
RECREATIONAL_ACTIVITIES: SLIGHT DIFFICULTY
ADL_TOTAL_ITEM_SCORE: 0
WALKING_APPROXIMATELY_10_MINUTES: NO DIFFICULTY AT ALL
LIGHT_TO_MODERATE_WORK: NO DIFFICULTY AT ALL
CUTTING/LATERAL_MOVEMENTS: NO DIFFICULTY AT ALL
WALKING_UP_STEEP_HILLS: SLIGHT DIFFICULTY
WALKING_UP_STEEP_HILLS: SLIGHT DIFFICULTY
PUTTING ON SOCKS AND SHOES: NO DIFFICULTY AT ALL
LIGHT_TO_MODERATE_WORK: NO DIFFICULTY AT ALL
SPORTS_SCORE(%): 0
LOW_IMPACT_ACTIVITIES_LIKE_FAST_WALKING: NO DIFFICULTY AT ALL
GOING_DOWN_1_FLIGHT_OF_STAIRS: NO DIFFICULTY AT ALL
PLEASE_INDICATE_YOR_PRIMARY_REASON_FOR_REFERRAL_TO_THERAPY:: HIP
ADL_HIGHEST_POTENTIAL_SCORE: 68
STARTING_AND_STOPPING_QUICKLY: SLIGHT DIFFICULTY
HOW_WOULD_YOU_RATE_YOUR_CURRENT_LEVEL_OF_FUNCTION_DURING_YOUR_USUAL_ACTIVITIES_OF_DAILY_LIVING_FROM_0_TO_100_WITH_100_BEING_YOUR_LEVEL_OF_FUNCTION_PRIOR_TO_YOUR_HIP_PROBLEM_AND_0_BEING_THE_INABILITY_TO_PERFORM_ANY_OF_YOUR_USUAL_DAILY_ACTIVITIES?: 100
SPORTS_TOTAL_ITEM_SCORE: 0
WALKING_15_MINUTES_OR_GREATER: NO DIFFICULTY AT ALL
ROLLING OVER IN BED: NO DIFFICULTY AT ALL
ROLLING_OVER_IN_BED: NO DIFFICULTY AT ALL
WALKING_FOR_APPROXIMATELY_10_MINUTES: NO DIFFICULTY AT ALL
SITTING FOR 15 MINUTES: NO DIFFICULTY AT ALL
JUMPING: NO DIFFICULTY AT ALL
WALKING_DOWN_STEEP_HILLS: NO DIFFICULTY AT ALL
GOING UP 1 FLIGHT OF STAIRS: NO DIFFICULTY AT ALL
GETTING_INTO_AND_OUT_OF_AN_AVERAGE_CAR: NO DIFFICULTY AT ALL
HOS_ADL_ITEM_SCORE_TOTAL: 66
TWISTING/PIVOTING_ON_INVOLVED_LEG: NO DIFFICULTY AT ALL
RECREATIONAL ACTIVITIES: SLIGHT DIFFICULTY
HOW_WOULD_YOU_RATE_YOUR_CURRENT_LEVEL_OF_FUNCTION_DURING_YOUR_USUAL_ACTIVITIES_OF_DAILY_LIVING_FROM_0_TO_100_WITH_100_BEING_YOUR_LEVEL_OF_FUNCTION_PRIOR_TO_YOUR_HIP_PROBLEM_AND_0_BEING_THE_INABILITY_TO_PERFORM_ANY_OF_YOUR_USUAL_DAILY_ACTIVITIES?: 100
ADL_SCORE(%): 0
ADL_COUNT: 17
WALKING_15_MINUTES_OR_GREATER: NO DIFFICULTY AT ALL
HOW_WOULD_YOU_RATE_YOUR_CURRENT_LEVEL_OF_FUNCTION_DURING_YOUR_SPORTS_RELATED_ACTIVITIES_FROM_0_TO_100_WITH_100_BEING_YOUR_LEVEL_OF_FUNCTION_PRIOR_TO_YOUR_HIP_PROBLEM_AND_0_BEING_THE_INABILITY_TO_PERFORM_ANY_OF_YOUR_USUAL_DAILY_ACTIVITIES?: 50
TWISTING/PIVOTING ON INVOLVED LEG: NO DIFFICULTY AT ALL
HEAVY_WORK: NO DIFFICULTY AT ALL
WALKING_INITIALLY: NO DIFFICULTY AT ALL
GOING DOWN 1 FLIGHT OF STAIRS: NO DIFFICULTY AT ALL
DEEP_SQUATTING: NO DIFFICULTY AT ALL
HOS_ADL_SCORE(%): 97.06
RUNNING_ONE_MILE: UNABLE TO DO
LANDING: NO DIFFICULTY AT ALL
SPORTS_HIGHEST_POTENTIAL_SCORE: 36
HEAVY_WORK: NO DIFFICULTY AT ALL
HOW_WOULD_YOU_RATE_YOUR_CURRENT_LEVEL_OF_FUNCTION?: NEARLY NORMAL
SITTING_FOR_15_MINUTES: NO DIFFICULTY AT ALL
STEPPING UP AND DOWN CURBS: NO DIFFICULTY AT ALL
GETTING_INTO_AND_OUT_OF_A_BATHTUB: NO DIFFICULTY AT ALL
WALKING_DOWN_STEEP_HILLS: NO DIFFICULTY AT ALL
DEEP SQUATTING: NO DIFFICULTY AT ALL
GETTING INTO AND OUT OF AN AVERAGE CAR: NO DIFFICULTY AT ALL
STEPPING_UP_AND_DOWN_CURBS: NO DIFFICULTY AT ALL
SPORTS_COUNT: 9
STANDING FOR 15 MINUTES: NO DIFFICULTY AT ALL
HOS_ADL_HIGHEST_POTENTIAL_SCORE: 68
STANDING_FOR_15_MINUTES: NO DIFFICULTY AT ALL

## 2025-05-27 ENCOUNTER — THERAPY VISIT (OUTPATIENT)
Dept: PHYSICAL THERAPY | Facility: CLINIC | Age: 65
End: 2025-05-27
Attending: NURSE PRACTITIONER
Payer: COMMERCIAL

## 2025-05-27 DIAGNOSIS — M25.551 HIP PAIN, RIGHT: ICD-10-CM

## 2025-05-27 PROCEDURE — 97110 THERAPEUTIC EXERCISES: CPT | Mod: GP

## 2025-05-27 PROCEDURE — 97161 PT EVAL LOW COMPLEX 20 MIN: CPT | Mod: GP

## 2025-05-27 NOTE — PROGRESS NOTES
PHYSICAL THERAPY EVALUATION  Type of Visit: Evaluation       Fall Risk Screen:  Have you fallen 2 or more times in the past year?: No  Have you fallen and had an injury in the past year?: No    Subjective     Patient presents with right hip/glute pain. It has been going on for over a year. Initiallyl thought it was a hamstring strain, she had tripped hard twice in a short amount of time, but it just didn't get better. Starting to improve a little. It is most bothersome running, going up a steep hill, extending sitting in a car. Has a triathlon in August that she would like to be able to run the 5K. Denies back pain. Currently swimming, yoga, and a strength class 1-2x/week, also walking and biking.        Presenting condition or subjective complaint: piriformis pain?  Date of onset: 05/13/25 (referral date)    Relevant medical history:     Dates & types of surgery:      Prior diagnostic imaging/testing results:       Prior therapy history for the same diagnosis, illness or injury: No      Prior Level of Function  Transfers: Independent  Ambulation: Independent  ADL: Independent  IADL: Independent    Living Environment  Social support: Alone   Type of home: House   Stairs to enter the home: Yes 1 Is there a railing: No     Ramp: No   Stairs inside the home: Yes 3 Is there a railing: Yes     Help at home: None  Equipment owned:       Employment: No    Hobbies/Interests: swimming, biking, knitting    Patient goals for therapy: run    Pain assessment: See objective evaluation for additional pain details     Objective   KEY FINDINGS/IMPRESSION:  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    LUMBAR SPINE EVALUATION  PAIN: Pain Level at Rest: 0/10  Pain Level with Use: 8/10  Pain Location: right sit bone, localized  Pain Quality: Burning and Sharp  Other symptoms: denies numbness/tingling  Pain Frequency: intermittent  Time Dependent?: no  Pain is Exacerbated By: running, extending  sitting, walking up a steep hill  Pain is Relieved By: getting up and moving after sitting  Pain Progression: Improved  INTEGUMENTARY (edema, incisions):   POSTURE:   GAIT:   Weightbearing Status:   Assistive Device(s):   Gait Deviations: WNL  BALANCE/PROPRIOCEPTION: WNL  WEIGHTBEARING ALIGNMENT:   NON-WEIGHTBEARING ALIGNMENT:      LUMBAR ROM:    Left Right   Lumbar Rotation     Lumbar Sidebend WNL WNL   Lumbar Flexion WNL *   Lumbar Extension WNL   *indicates pain    HIP ROM: WNL    FUNCTIONAL TESTS:   Double Leg Squat: Good technique/no significant findings  Single Leg Squat: Knee valgus, Hip internal rotation, and no pain    PELVIC/SI SCREEN:     MYOTOMES:    Left Right   T12-L3 (Hip Flexion) 5 5   L2-4 (Quads)  5 5   L4 (Ankle DF)     L5 (Great Toe Ext)     S1 (Toe Raise)       LE Strength   Left Right   Hip Extension 5 5   Hip Abduction 5 5   Hip IR 5 5   Hip ER 5 5   Knee Flexion 5 5 (tested sitting 90/90, prone knee flexion 45/90/110)   *indicates pain    FLEXIBILITY: mild hamstring and quad tightness without pain   LUMBAR/HIP Special Tests:    Left Right   IRMA  Positive *lateral hip pain/tightess, doesn't reproduce concordant sign   FADIR/Labrum/RAMIREZ  Negative    Femoral Nerve     Pepito's     Piriformis     Quadrant Testing     SLR  Negative    Slump     Stork with Extension     Van     Log Roll        PELVIS/SI SPECIAL TESTS:   PALPATION: no TTP through hamstrings, gluteals      Assessment & Plan   CLINICAL IMPRESSIONS  Medical Diagnosis: Hip pain, right    Treatment Diagnosis: Right hamstring tendinopathy   Impression/Assessment: Patient is a 64 year old female with right hip complaints.  The following significant findings have been identified: Pain, Decreased ROM/flexibility, Decreased strength, Impaired balance, Impaired gait, and Decreased activity tolerance. These impairments interfere with their ability to perform self care tasks, recreational activities, household chores, driving , household  mobility, and community mobility as compared to previous level of function.     Clinical Decision Making (Complexity):  Clinical Presentation: Stable/Uncomplicated  Clinical Presentation Rationale: based on medical and personal factors listed in PT evaluation  Clinical Decision Making (Complexity): Low complexity    PLAN OF CARE  Treatment Interventions:  Modalities: Cupping, Dry Needling  Interventions: Gait Training, Manual Therapy, Neuromuscular Re-education, Therapeutic Activity, Therapeutic Exercise, Self-Care/Home Management    Long Term Goals     PT Goal 1  Goal Identifier: Walking  Goal Description: Patient will be able to walk up incline without increased pain  Rationale: to maximize safety and independence with performance of ADLs and functional tasks;to maximize safety and independence within the community  Target Date: 08/19/25  PT Goal 2  Goal Identifier: Jogging  Goal Description: Patient will be able to jog a 5K w/ pain no greater than 2/10 in order to maintain healthy and active lifestyle  Target Date: 08/19/25      Frequency of Treatment: 1x/week  Duration of Treatment: 4 weeks tapering to 2x/month for 8 weeks    Recommended Referrals to Other Professionals: none at this time  Education Assessment:   Learner/Method: Patient;Demonstration;Pictures/Video;No Barriers to Learning    Risks and benefits of evaluation/treatment have been explained.   Patient/Family/caregiver agrees with Plan of Care.     Evaluation Time:     PT Eval, Low Complexity Minutes (07014): 20     Signing Clinician: Delmy Merino PT        [facility]                                                                                   OUTPATIENT PHYSICAL THERAPY      PLAN OF TREATMENT FOR OUTPATIENT REHABILITATION   Patient's Last Name, First Name, Barb Escobar YOB: 1960   Provider's Name   [facility]   Medical Record No.  [id number]      Onset Date: 05/13/25 (referral date)  Start of Care Date: 05/27/25     Medical Diagnosis:  Hip pain, right      PT Treatment Diagnosis:  Right hamstring tendinopathy Plan of Treatment  Frequency/Duration: 1x/week/ 4 weeks tapering to 2x/month for 8 weeks    Certification date from 05/27/25 to 08/19/25         See note for plan of treatment details and functional goals     Delmy Merino, PT                         I CERTIFY THE NEED FOR THESE SERVICES FURNISHED UNDER        THIS PLAN OF TREATMENT AND WHILE UNDER MY CARE     (Physician attestation of this document indicates review and certification of the therapy plan).              Referring Provider:  Daly Euceda    Initial Assessment  See Epic Evaluation- Start of Care Date: 05/27/25

## 2025-06-10 ENCOUNTER — THERAPY VISIT (OUTPATIENT)
Dept: PHYSICAL THERAPY | Facility: CLINIC | Age: 65
End: 2025-06-10
Attending: NURSE PRACTITIONER
Payer: COMMERCIAL

## 2025-06-10 DIAGNOSIS — M25.551 HIP PAIN, RIGHT: Primary | ICD-10-CM

## 2025-06-10 PROCEDURE — 97110 THERAPEUTIC EXERCISES: CPT | Mod: GP

## 2025-07-01 ENCOUNTER — THERAPY VISIT (OUTPATIENT)
Dept: PHYSICAL THERAPY | Facility: CLINIC | Age: 65
End: 2025-07-01
Payer: COMMERCIAL

## 2025-07-01 DIAGNOSIS — M25.551 HIP PAIN, RIGHT: Primary | ICD-10-CM

## 2025-07-01 PROCEDURE — 97110 THERAPEUTIC EXERCISES: CPT | Mod: GP

## 2025-08-15 ENCOUNTER — TRANSFERRED RECORDS (OUTPATIENT)
Dept: ADMINISTRATIVE | Facility: CLINIC | Age: 65
End: 2025-08-15
Payer: COMMERCIAL

## (undated) RX ORDER — SULFAMETHOXAZOLE AND TRIMETHOPRIM 80; 16 MG/ML; MG/ML
INJECTION INTRAVENOUS
Status: DISPENSED
Start: 2023-07-03